# Patient Record
Sex: FEMALE | Race: WHITE | Employment: OTHER | URBAN - METROPOLITAN AREA
[De-identification: names, ages, dates, MRNs, and addresses within clinical notes are randomized per-mention and may not be internally consistent; named-entity substitution may affect disease eponyms.]

---

## 2017-02-10 PROBLEM — M81.0 OSTEOPOROSIS: Status: ACTIVE | Noted: 2017-02-10

## 2017-06-19 PROBLEM — J30.1 SEASONAL ALLERGIC RHINITIS DUE TO POLLEN: Status: ACTIVE | Noted: 2017-06-19

## 2018-02-09 PROBLEM — M19.012: Status: ACTIVE | Noted: 2017-10-31

## 2018-02-09 PROBLEM — M81.0 OSTEOPOROSIS: Status: RESOLVED | Noted: 2017-02-10 | Resolved: 2018-02-09

## 2018-02-09 PROBLEM — S72.002A CLOSED FRACTURE OF NECK OF LEFT FEMUR (HCC): Status: ACTIVE | Noted: 2017-11-15

## 2018-04-28 ENCOUNTER — HOME HEALTH ADMISSION (OUTPATIENT)
Dept: HOME HEALTH SERVICES | Facility: HOME HEALTH | Age: 78
End: 2018-04-28

## 2018-04-28 ENCOUNTER — APPOINTMENT (OUTPATIENT)
Dept: GENERAL RADIOLOGY | Age: 78
End: 2018-04-28
Attending: EMERGENCY MEDICINE
Payer: MEDICARE

## 2018-04-28 ENCOUNTER — HOSPITAL ENCOUNTER (EMERGENCY)
Age: 78
Discharge: HOME | End: 2018-04-28
Attending: EMERGENCY MEDICINE
Payer: MEDICARE

## 2018-04-28 VITALS
BODY MASS INDEX: 24.25 KG/M2 | WEIGHT: 160 LBS | HEART RATE: 80 BPM | TEMPERATURE: 98 F | DIASTOLIC BLOOD PRESSURE: 86 MMHG | HEIGHT: 68 IN | RESPIRATION RATE: 20 BRPM | OXYGEN SATURATION: 97 % | SYSTOLIC BLOOD PRESSURE: 164 MMHG

## 2018-04-28 DIAGNOSIS — S80.11XA CONTUSION OF RIGHT KNEE AND LOWER LEG, INITIAL ENCOUNTER: ICD-10-CM

## 2018-04-28 DIAGNOSIS — S40.012A CONTUSION OF LEFT SHOULDER, INITIAL ENCOUNTER: ICD-10-CM

## 2018-04-28 DIAGNOSIS — M25.552 LEFT HIP PAIN: ICD-10-CM

## 2018-04-28 DIAGNOSIS — M79.602 LEFT ARM PAIN: ICD-10-CM

## 2018-04-28 DIAGNOSIS — S70.02XA CONTUSION OF LEFT HIP, INITIAL ENCOUNTER: ICD-10-CM

## 2018-04-28 DIAGNOSIS — Z91.81 HISTORY OF FALL: ICD-10-CM
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
## 2018-04-28 DIAGNOSIS — S80.01XA CONTUSION OF RIGHT KNEE AND LOWER LEG, INITIAL ENCOUNTER: ICD-10-CM

## 2018-04-28 PROCEDURE — 73080 X-RAY EXAM OF ELBOW: CPT

## 2018-04-28 PROCEDURE — 73030 X-RAY EXAM OF SHOULDER: CPT

## 2018-04-28 PROCEDURE — 73562 X-RAY EXAM OF KNEE 3: CPT

## 2018-04-28 PROCEDURE — 73502 X-RAY EXAM HIP UNI 2-3 VIEWS: CPT

## 2018-04-28 PROCEDURE — 99283 EMERGENCY DEPT VISIT LOW MDM: CPT | Performed by: EMERGENCY MEDICINE

## 2018-04-28 RX ORDER — OXYCODONE HYDROCHLORIDE 5 MG/1
2.5 TABLET ORAL
Qty: 8 TAB | Refills: 0 | Status: ON HOLD | OUTPATIENT
Start: 2018-04-28 | End: 2018-05-05

## 2018-04-29 NOTE — ED NOTES
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

## 2018-04-29 NOTE — ED NOTES
Pt presents to ED with pain to left arm, pt states she fell at home on Thursday while attempting to carry items when using walker, pt states she hit head but did not lose continuous.

## 2018-04-29 NOTE — DISCHARGE INSTRUCTIONS
Cool compresses the left hip from one or 2 days. Then change to heat. Recheck with your doctor next week if not improving. Recheck for signs of infection. Change gauze over skin tears every 1-2 days and continue to use antibiotic ointment. Contusion: Care Instructions  Your Care Instructions    Contusion is the medical term for a bruise. It is the result of a direct blow or an impact, such as a fall. Contusions are common sports injuries. Most people think of a bruise as a black-and-blue spot. This happens when small blood vessels get torn and leak blood under the skin. But bones, muscles, and organs can also get bruised. This may damage deep tissues but not cause a bruise you can see. The doctor will do a physical exam to find the location of your contusion. You may also have tests to make sure you do not have a more serious injury, such as a broken bone or nerve damage. These may include X-rays or other imaging tests like a CT scan or MRI. Deep-tissue contusions may cause pain and swelling. But if there is no serious damage, they will often get better in a few weeks with home treatment. The doctor has checked you carefully, but problems can develop later. If you notice any problems or new symptoms, get medical treatment right away. Follow-up care is a key part of your treatment and safety. Be sure to make and go to all appointments, and call your doctor if you are having problems. It's also a good idea to know your test results and keep a list of the medicines you take. How can you care for yourself at home? · Put ice or a cold pack on the sore area for 10 to 20 minutes at a time to stop swelling. Put a thin cloth between the ice pack and your skin. · Be safe with medicines. Read and follow all instructions on the label. ¨ If the doctor gave you a prescription medicine for pain, take it as prescribed.   ¨ If you are not taking a prescription pain medicine, ask your doctor if you can take an over-the-counter medicine. · If you can, prop up the sore area on pillows as much as possible for the next few days. Try to keep the sore area above the level of your heart. When should you call for help? Call your doctor now or seek immediate medical care if:  ? · Your pain gets worse. ? · You have new or worse swelling. ? · You have tingling, weakness, or numbness in the area near the contusion. ? · The area near the contusion is cold or pale. ? Watch closely for changes in your health, and be sure to contact your doctor if:  ? · You do not get better as expected. Where can you learn more? Go to http://nury-joe.info/. Enter P180 in the search box to learn more about \"Contusion: Care Instructions. \"  Current as of: March 20, 2017  Content Version: 11.4  © 1088-1433 [employer]. Care instructions adapted under license by [employer] (which disclaims liability or warranty for this information). If you have questions about a medical condition or this instruction, always ask your healthcare professional. [address] any warranty or liability for your use of this information.

## 2018-04-29 NOTE — ED NOTES
I have reviewed discharge instructions with the patient and spouse. The patient and spouse verbalized understanding. Patient left ED via Discharge Method: wheelchair to Home with (spouse). Opportunity for questions and clarification provided. Patient given 1 scripts. To continue your aftercare when you leave the hospital, you may receive an automated call from our care team to check in on how you are doing. This is a free service and part of our promise to provide the best care and service to meet your aftercare needs.  If you have questions, or wish to unsubscribe from this service please call [phone]. Thank you for Choosing our [facility] Emergency Department.

## 2018-04-29 NOTE — ED TRIAGE NOTES
Pt fell in her driveway [id number]. Saw her PCP [id number], advised to come to [facility] for xrays. Patient and her  came today. Pt c/o pain to left hip, left arm and right knee.

## 2018-04-29 NOTE — ED PROVIDER NOTES
HPI Comments: 57-year-old female uses a walker. She states she lost her balance and fell to the left yesterday. Struck her left elbow and landed on her left hip. She may have bumped her head but denies any loss of consciousness or vomiting or lethargy. Complains mainly left shoulder and hip pain. She had previous left hip surgery due to fracture. No chest pain shortness of breath or abdominal pain. No focal numbness or weakness. Patient is a 76 y.o. female presenting with fall and arm pain. Fall   The accident occurred 2 days ago. The fall occurred while standing. She fell from a height of ground level. She landed on hard floor. There was no blood loss. The point of impact was the left shoulder. The pain is mild. She was ambulatory at the scene. Associated symptoms include laceration. Pertinent negatives include no fever, no numbness, no abdominal pain, no nausea, no vomiting, no extremity weakness and no loss of consciousness. Arm Pain    Pertinent negatives include no numbness. Past Medical History:   Diagnosis Date    Anxiety     Arthritis     Cancer ([facility] [address]     Depression     History of bone density study 08/2016    Dr. Armida Chappell    Hypercholesterolemia     Hypertension     Onychomycosis     Osteoporosis     Thyroid disease        Past Surgical History:   Procedure Laterality Date    BREAST SURGERY PROCEDURE UNLISTED      HX CHOLECYSTECTOMY      HX COLECTOMY  2003    HX COLONOSCOPY  2012    HX GYN      HX HEENT      HX ORTHOPAEDIC           Family History:   Problem Relation Age of Onset    Heart Disease Mother     Heart Disease Father        Social History     Social History    Marital status:      Spouse name: N/A    Number of children: N/A    Years of education: N/A     Occupational History    Not on file.      Social History Main Topics    Smoking status: Former Smoker    Smokeless tobacco: Never Used    Alcohol use No    Drug use: No    Sexual activity: Not on file     Other Topics Concern    Not on file     Social History Narrative         ALLERGIES: Codeine; Iodine and iodide containing products; Penicillins; Adhesive; Ammonium lactate; Amoxicillin; Glutamine-c-quercet-selen-brom; Honey; Procaine; and Streptomycin    Review of Systems   Constitutional: Negative for chills and fever. Respiratory: Negative for shortness of breath. Cardiovascular: Negative for chest pain. Gastrointestinal: Negative for abdominal pain, nausea and vomiting. Musculoskeletal: Negative for extremity weakness. Neurological: Negative for loss of consciousness, weakness and numbness. Vitals:    04/28/18 1959 04/28/18 2001 04/28/18 2055   BP: 164/86 164/86    Pulse:  80    Resp:  20    Temp:  98 °F (36.7 °C)    SpO2: 93% 96% 97%   Weight:  72.6 kg (160 lb)    Height:  5' 8\" (1.727 m)             Physical Exam   Constitutional: She is oriented to person, place, and time. She appears well-developed and well-nourished. No distress. HENT:   Head: Normocephalic and atraumatic. Mouth/Throat: Oropharynx is clear and moist.   Eyes: EOM are normal. Pupils are equal, round, and reactive to light. Neck: Normal range of motion. Neck supple. Muscular tenderness present. Cardiovascular: Normal rate, regular rhythm and normal heart sounds. Pulmonary/Chest: Effort normal and breath sounds normal.   Abdominal: Soft. She exhibits no distension. There is no tenderness. Musculoskeletal:        Left shoulder: She exhibits decreased range of motion, tenderness and bony tenderness. Left elbow: She exhibits laceration. She exhibits no swelling and no effusion. Left hip: She exhibits decreased range of motion, tenderness and bony tenderness. Right knee: She exhibits no swelling and no effusion. Tenderness found. Legs:  Skin tears about left elbow and wrist.  There are no signs of infection.   No bony tenderness left elbow and wrist.   Neurological: She is alert and oriented to person, place, and time. GCS eye subscore is 4. GCS verbal subscore is 5. GCS motor subscore is 6. Speech normal   Skin: Laceration noted. Nursing note and vitals reviewed. MDM  Number of Diagnoses or Management Options  Diagnosis management comments: Imaging of affected areas. Amount and/or Complexity of Data Reviewed  Tests in the radiology section of CPT®: ordered and reviewed    Risk of Complications, Morbidity, and/or Mortality  Presenting problems: moderate  Diagnostic procedures: minimal  Management options: low          ED Course       Procedures    Xr Shoulder Lt Ap/lat Min 2 V    Result Date: 4/28/2018  X-ray left shoulder 3 views. HISTORY: Pain. COMPARISON: None. FINDINGS: Status post left shoulder replacement. No acute fractures are identified. The soft tissues are normal.     IMPRESSION: No acute abnormalities. Xr Elbow Lt Min 3 V    Result Date: 4/28/2018  EXAM:  XR ELBOW LT MIN 3 V INDICATION:   L elbow pain, s/p fall COMPARISON: None. FINDINGS: Three  views of the left elbow demonstrate no fracture, dislocation, effusion or other abnormality. IMPRESSION: Normal left elbow. Xr Hip Lt W Or Wo Pelv 2-3 Vws    Result Date: 4/28/2018  EXAM:  XR HIP LT W OR WO PELV 2-3 VWS INDICATION:   s/p fall, L hip pain, hx of L hip replacement COMPARISON: None. FINDINGS: An AP view of the pelvis and a frogleg lateral view of the left hip demonstrate an unremarkable total hip replacement with a cerclage wire in the infratrochanteric region. No fractures are identified. There is no evidence of loosening. Remaining pelvic bones are intact. .      IMPRESSION: Unremarkable left total hip replacement. Xr Knee Rt 3 V    Result Date: 4/28/2018  EXAM:  XR KNEE RT 3 V INDICATION:   Fall [id number] COMPARISON: None. FINDINGS: Three views of the right knee demonstrate no fracture, effusion or other osseous, articular or soft tissue abnormality. IMPRESSION:  Normal right knee. Patient has had gastric surgery before. Told she was not to take any anti-inflammatory medications. Also told not to take Tylenol due to  liver issues.

## 2018-05-01 ENCOUNTER — HOME CARE VISIT (OUTPATIENT)
Dept: SCHEDULING | Facility: HOME HEALTH | Age: 78
End: 2018-05-01

## 2018-05-02 ENCOUNTER — HOSPITAL ENCOUNTER (INPATIENT)
Age: 78
LOS: 3 days | Discharge: NURSING, REHAB OR LONG-TERM CARE | DRG: 087 | End: 2018-05-05
Attending: EMERGENCY MEDICINE | Admitting: HOSPITALIST
Payer: MEDICARE

## 2018-05-02 ENCOUNTER — APPOINTMENT (OUTPATIENT)
Dept: GENERAL RADIOLOGY | Age: 78
DRG: 087 | End: 2018-05-02
Attending: EMERGENCY MEDICINE
Payer: MEDICARE

## 2018-05-02 ENCOUNTER — APPOINTMENT (OUTPATIENT)
Dept: CT IMAGING | Age: 78
DRG: 087 | End: 2018-05-02
Attending: EMERGENCY MEDICINE
Payer: MEDICARE

## 2018-05-02 DIAGNOSIS — E78.2 COMBINED HYPERLIPIDEMIA: ICD-10-CM

## 2018-05-02 DIAGNOSIS — E03.4 ACQUIRED ATROPHY OF THYROID: ICD-10-CM

## 2018-05-02 DIAGNOSIS — I10 ESSENTIAL HYPERTENSION: ICD-10-CM

## 2018-05-02 DIAGNOSIS — F41.1 GENERALIZED ANXIETY DISORDER: ICD-10-CM

## 2018-05-02 DIAGNOSIS — S06.5XAA SUBDURAL HEMATOMA: Primary | ICD-10-CM

## 2018-05-02 DIAGNOSIS — F51.01 PRIMARY INSOMNIA: ICD-10-CM

## 2018-05-02 DIAGNOSIS — S70.02XA CONTUSION OF LEFT HIP, INITIAL ENCOUNTER: ICD-10-CM

## 2018-05-02 LAB
ALBUMIN SERPL-MCNC: 3.3 G/DL (ref 3.2–4.6)
ALBUMIN/GLOB SERPL: 0.8 {RATIO} (ref 1.2–3.5)
ALP SERPL-CCNC: 96 U/L (ref 50–136)
ALT SERPL-CCNC: 53 U/L (ref 12–65)
ANION GAP SERPL CALC-SCNC: 10 MMOL/L (ref 7–16)
AST SERPL-CCNC: 58 U/L (ref 15–37)
BASOPHILS # BLD: 0 K/UL (ref 0–0.2)
BASOPHILS NFR BLD: 0 % (ref 0–2)
BILIRUB SERPL-MCNC: 0.5 MG/DL (ref 0.2–1.1)
BUN SERPL-MCNC: 30 MG/DL (ref 8–23)
CALCIUM SERPL-MCNC: 9 MG/DL (ref 8.3–10.4)
CHLORIDE SERPL-SCNC: 101 MMOL/L (ref 98–107)
CK SERPL-CCNC: 52 U/L (ref 21–215)
CO2 SERPL-SCNC: 26 MMOL/L (ref 21–32)
CREAT SERPL-MCNC: 1.18 MG/DL (ref 0.6–1)
DIFFERENTIAL METHOD BLD: ABNORMAL
EOSINOPHIL # BLD: 0.3 K/UL (ref 0–0.8)
EOSINOPHIL NFR BLD: 4 % (ref 0.5–7.8)
ERYTHROCYTE [DISTWIDTH] IN BLOOD BY AUTOMATED COUNT: 13.4 % (ref 11.9–14.6)
GLOBULIN SER CALC-MCNC: 3.9 G/DL (ref 2.3–3.5)
GLUCOSE SERPL-MCNC: 141 MG/DL (ref 65–100)
HCT VFR BLD AUTO: 35.8 % (ref 35.8–46.3)
HGB BLD-MCNC: 11.8 G/DL (ref 11.7–15.4)
IMM GRANULOCYTES # BLD: 0 K/UL (ref 0–0.5)
IMM GRANULOCYTES NFR BLD AUTO: 0 % (ref 0–5)
INR PPP: 0.9
LYMPHOCYTES # BLD: 1.6 K/UL (ref 0.5–4.6)
LYMPHOCYTES NFR BLD: 19 % (ref 13–44)
MCH RBC QN AUTO: 30 PG (ref 26.1–32.9)
MCHC RBC AUTO-ENTMCNC: 33 G/DL (ref 31.4–35)
MCV RBC AUTO: 91.1 FL (ref 79.6–97.8)
MONOCYTES # BLD: 0.7 K/UL (ref 0.1–1.3)
MONOCYTES NFR BLD: 9 % (ref 4–12)
NEUTS SEG # BLD: 5.8 K/UL (ref 1.7–8.2)
NEUTS SEG NFR BLD: 68 % (ref 43–78)
PLATELET # BLD AUTO: 339 K/UL (ref 150–450)
PMV BLD AUTO: 9 FL (ref 10.8–14.1)
POTASSIUM SERPL-SCNC: 3.5 MMOL/L (ref 3.5–5.1)
PROT SERPL-MCNC: 7.2 G/DL (ref 6.3–8.2)
PROTHROMBIN TIME: 12.3 SEC (ref 11.5–14.5)
RBC # BLD AUTO: 3.93 M/UL (ref 4.05–5.25)
SODIUM SERPL-SCNC: 137 MMOL/L (ref 136–145)
TSH SERPL DL<=0.005 MIU/L-ACNC: 1.43 UIU/ML (ref 0.36–3.74)
WBC # BLD AUTO: 8.5 K/UL (ref 4.3–11.1)

## 2018-05-02 PROCEDURE — 85610 PROTHROMBIN TIME: CPT | Performed by: EMERGENCY MEDICINE

## 2018-05-02 PROCEDURE — 82550 ASSAY OF CK (CPK): CPT | Performed by: EMERGENCY MEDICINE

## 2018-05-02 PROCEDURE — 93005 ELECTROCARDIOGRAM TRACING: CPT | Performed by: EMERGENCY MEDICINE

## 2018-05-02 PROCEDURE — 74011250636 HC RX REV CODE- 250/636: Performed by: EMERGENCY MEDICINE

## 2018-05-02 PROCEDURE — 65610000001 HC ROOM ICU GENERAL

## 2018-05-02 PROCEDURE — 96360 HYDRATION IV INFUSION INIT: CPT | Performed by: EMERGENCY MEDICINE

## 2018-05-02 PROCEDURE — 99284 EMERGENCY DEPT VISIT MOD MDM: CPT | Performed by: EMERGENCY MEDICINE

## 2018-05-02 PROCEDURE — 74011250637 HC RX REV CODE- 250/637: Performed by: HOSPITALIST

## 2018-05-02 PROCEDURE — 85025 COMPLETE CBC W/AUTO DIFF WBC: CPT | Performed by: EMERGENCY MEDICINE

## 2018-05-02 PROCEDURE — 74011250636 HC RX REV CODE- 250/636: Performed by: HOSPITALIST

## 2018-05-02 PROCEDURE — 70450 CT HEAD/BRAIN W/O DYE: CPT

## 2018-05-02 PROCEDURE — 36415 COLL VENOUS BLD VENIPUNCTURE: CPT | Performed by: HOSPITALIST

## 2018-05-02 PROCEDURE — 96361 HYDRATE IV INFUSION ADD-ON: CPT | Performed by: EMERGENCY MEDICINE

## 2018-05-02 PROCEDURE — 84443 ASSAY THYROID STIM HORMONE: CPT | Performed by: HOSPITALIST

## 2018-05-02 PROCEDURE — 71045 X-RAY EXAM CHEST 1 VIEW: CPT

## 2018-05-02 PROCEDURE — 73080 X-RAY EXAM OF ELBOW: CPT

## 2018-05-02 PROCEDURE — 80053 COMPREHEN METABOLIC PANEL: CPT | Performed by: EMERGENCY MEDICINE

## 2018-05-02 RX ORDER — SODIUM CHLORIDE 0.9 % (FLUSH) 0.9 %
5-10 SYRINGE (ML) INJECTION EVERY 8 HOURS
Status: DISCONTINUED | OUTPATIENT
Start: 2018-05-02 | End: 2018-05-05 | Stop reason: HOSPADM

## 2018-05-02 RX ORDER — BUSPIRONE HYDROCHLORIDE 30 MG/1
30 TABLET ORAL 3 TIMES DAILY
COMMUNITY
End: 2019-05-24

## 2018-05-02 RX ORDER — GLUCOSAMINE/CHONDR SU A SOD 750-600 MG
TABLET ORAL 2 TIMES DAILY
COMMUNITY

## 2018-05-02 RX ORDER — ASCORBIC ACID 500 MG
500 TABLET ORAL DAILY
COMMUNITY

## 2018-05-02 RX ORDER — ALBUTEROL SULFATE 90 UG/1
2 AEROSOL, METERED RESPIRATORY (INHALATION)
Status: DISCONTINUED | OUTPATIENT
Start: 2018-05-02 | End: 2018-05-05 | Stop reason: HOSPADM

## 2018-05-02 RX ORDER — SODIUM CHLORIDE 0.9 % (FLUSH) 0.9 %
5-10 SYRINGE (ML) INJECTION AS NEEDED
Status: DISCONTINUED | OUTPATIENT
Start: 2018-05-02 | End: 2018-05-05 | Stop reason: HOSPADM

## 2018-05-02 RX ORDER — ACETAMINOPHEN 500 MG
1000 TABLET ORAL
Status: DISPENSED | OUTPATIENT
Start: 2018-05-02 | End: 2018-05-03

## 2018-05-02 RX ORDER — SODIUM CHLORIDE 9 MG/ML
100 INJECTION, SOLUTION INTRAVENOUS CONTINUOUS
Status: DISCONTINUED | OUTPATIENT
Start: 2018-05-02 | End: 2018-05-03

## 2018-05-02 RX ORDER — NALOXONE HYDROCHLORIDE 2 MG/.4ML
2 INJECTION, SOLUTION INTRAMUSCULAR; SUBCUTANEOUS
Qty: 1 DEVICE | Refills: 0 | Status: SHIPPED | OUTPATIENT
Start: 2018-05-02 | End: 2018-11-19

## 2018-05-02 RX ORDER — CYCLOBENZAPRINE HCL 10 MG
10 TABLET ORAL
Status: DISCONTINUED | OUTPATIENT
Start: 2018-05-02 | End: 2018-05-05 | Stop reason: HOSPADM

## 2018-05-02 RX ORDER — ACETAMINOPHEN 325 MG/1
650 TABLET ORAL
Status: DISCONTINUED | OUTPATIENT
Start: 2018-05-02 | End: 2018-05-05 | Stop reason: HOSPADM

## 2018-05-02 RX ORDER — DULOXETIN HYDROCHLORIDE 60 MG/1
60 CAPSULE, DELAYED RELEASE ORAL 2 TIMES DAILY
Status: DISCONTINUED | OUTPATIENT
Start: 2018-05-03 | End: 2018-05-05 | Stop reason: HOSPADM

## 2018-05-02 RX ORDER — CALCIUM CARBONATE 200(500)MG
200 TABLET,CHEWABLE ORAL
Status: DISCONTINUED | OUTPATIENT
Start: 2018-05-03 | End: 2018-05-05 | Stop reason: HOSPADM

## 2018-05-02 RX ORDER — MELATONIN
2000 DAILY
Status: DISCONTINUED | OUTPATIENT
Start: 2018-05-03 | End: 2018-05-05 | Stop reason: HOSPADM

## 2018-05-02 RX ORDER — BISACODYL 5 MG
5 TABLET, DELAYED RELEASE (ENTERIC COATED) ORAL DAILY PRN
Status: DISCONTINUED | OUTPATIENT
Start: 2018-05-02 | End: 2018-05-05 | Stop reason: HOSPADM

## 2018-05-02 RX ORDER — TRIAMCINOLONE ACETONIDE 0.25 MG/G
2.5 CREAM TOPICAL 2 TIMES DAILY
COMMUNITY
End: 2021-06-08 | Stop reason: SDUPTHER

## 2018-05-02 RX ORDER — MISOPROSTOL 200 UG/1
200 TABLET ORAL 2 TIMES DAILY WITH MEALS
Status: DISCONTINUED | OUTPATIENT
Start: 2018-05-03 | End: 2018-05-05 | Stop reason: HOSPADM

## 2018-05-02 RX ORDER — BUPROPION HYDROCHLORIDE 150 MG/1
150 TABLET, EXTENDED RELEASE ORAL DAILY
Status: DISCONTINUED | OUTPATIENT
Start: 2018-05-03 | End: 2018-05-03

## 2018-05-02 RX ORDER — PRAVASTATIN SODIUM 20 MG/1
40 TABLET ORAL
Status: DISCONTINUED | OUTPATIENT
Start: 2018-05-02 | End: 2018-05-05 | Stop reason: HOSPADM

## 2018-05-02 RX ORDER — HYDRALAZINE HYDROCHLORIDE 20 MG/ML
10 INJECTION INTRAMUSCULAR; INTRAVENOUS
Status: DISCONTINUED | OUTPATIENT
Start: 2018-05-02 | End: 2018-05-05 | Stop reason: HOSPADM

## 2018-05-02 RX ORDER — ONDANSETRON 2 MG/ML
4 INJECTION INTRAMUSCULAR; INTRAVENOUS
Status: DISCONTINUED | OUTPATIENT
Start: 2018-05-02 | End: 2018-05-05 | Stop reason: HOSPADM

## 2018-05-02 RX ORDER — NALOXONE HYDROCHLORIDE 0.4 MG/ML
0.4 INJECTION, SOLUTION INTRAMUSCULAR; INTRAVENOUS; SUBCUTANEOUS AS NEEDED
Status: DISCONTINUED | OUTPATIENT
Start: 2018-05-02 | End: 2018-05-05 | Stop reason: HOSPADM

## 2018-05-02 RX ORDER — CLONAZEPAM 1 MG/1
2 TABLET ORAL
Status: DISCONTINUED | OUTPATIENT
Start: 2018-05-02 | End: 2018-05-05 | Stop reason: HOSPADM

## 2018-05-02 RX ORDER — PANTOPRAZOLE SODIUM 40 MG/1
40 TABLET, DELAYED RELEASE ORAL DAILY
Status: DISCONTINUED | OUTPATIENT
Start: 2018-05-03 | End: 2018-05-05 | Stop reason: HOSPADM

## 2018-05-02 RX ORDER — AMLODIPINE BESYLATE 5 MG/1
5 TABLET ORAL ONCE
Status: COMPLETED | OUTPATIENT
Start: 2018-05-02 | End: 2018-05-02

## 2018-05-02 RX ORDER — OXYCODONE HYDROCHLORIDE 5 MG/1
2.5 TABLET ORAL
Status: DISCONTINUED | OUTPATIENT
Start: 2018-05-02 | End: 2018-05-03

## 2018-05-02 RX ORDER — AMLODIPINE BESYLATE 5 MG/1
5 TABLET ORAL DAILY
Status: DISCONTINUED | OUTPATIENT
Start: 2018-05-03 | End: 2018-05-05 | Stop reason: HOSPADM

## 2018-05-02 RX ORDER — DIAPER,BRIEF,INFANT-TODD,DISP
2.5 EACH MISCELLANEOUS 2 TIMES DAILY
COMMUNITY
End: 2018-11-19 | Stop reason: SDUPTHER

## 2018-05-02 RX ADMIN — CLONAZEPAM 2 MG: 0.5 TABLET ORAL at 22:16

## 2018-05-02 RX ADMIN — PRAVASTATIN SODIUM 40 MG: 20 TABLET ORAL at 22:17

## 2018-05-02 RX ADMIN — Medication 10 ML: at 22:00

## 2018-05-02 RX ADMIN — AMLODIPINE BESYLATE 5 MG: 5 TABLET ORAL at 22:18

## 2018-05-02 RX ADMIN — SODIUM CHLORIDE 100 ML/HR: 900 INJECTION, SOLUTION INTRAVENOUS at 22:05

## 2018-05-02 RX ADMIN — SODIUM CHLORIDE 1000 ML: 900 INJECTION, SOLUTION INTRAVENOUS at 18:26

## 2018-05-02 NOTE — ED TRIAGE NOTES
Pt presents to the ED with ,  Pts  reports she has had a few falls,  Was seen by PCP and x rays performed,  Skin tear noted to L hand and elbow,  Pt reports pain throughout body and confused more recently,  Unable to get words out and feels very fatigued.

## 2018-05-02 NOTE — ED PROVIDER NOTES
HPI Comments: Patient fell last Thursday. Injured her left elbow and bruised her left hip. Also hit the back of her head. Had x-rays of her left elbow and left hip which were negative with her primary care doctor. Developed a headache the day after her x-rays which is mild posterior nature. He has continued to today. Family reports yesterday patient developed some difficulty with her speech on occasion having difficulty getting a certain word out. Patient notices when this is happening he gets very frustrated. No slurred speech. No numbness or weakness. Patient is a 76 y.o. female presenting with fall and altered mental status. The history is provided by the patient and a relative. No  was used. Fall   The accident occurred more than 2 days ago. The fall occurred while walking. She fell from a height of ground level. She landed on hard floor. The volume of blood lost was minimal. The point of impact was the left elbow and head. The pain is present in the left elbow. The pain is mild. She was ambulatory at the scene. Associated symptoms include headaches. Pertinent negatives include no visual change, no fever, no numbness, no abdominal pain, no bowel incontinence, no nausea, no vomiting, no hematuria, no extremity weakness, no loss of consciousness, no tingling and no laceration. The risk factors include being elderly. The symptoms are aggravated by pressure on injury and use of injured limb. She has tried nothing for the symptoms. Altered mental status    This is a new problem. The current episode started yesterday. The problem has not changed since onset. Pertinent negatives include no confusion, no seizures, no unresponsiveness, no weakness, no agitation, no self-injury, no tingling and no numbness. Mental status baseline is normal.  Risk factors include trauma. Her past medical history is significant for hypertension and head trauma.  Her past medical history does not include diabetes, CVA, TIA or heart disease. Past Medical History:   Diagnosis Date    Anxiety     Arthritis     Cancer ([facility] [address]     Depression     History of bone density study 08/2016    Dr. Gaurav Fournier    Hypercholesterolemia     Hypertension     Onychomycosis     Osteoporosis     Thyroid disease        Past Surgical History:   Procedure Laterality Date    BREAST SURGERY PROCEDURE UNLISTED      HX CHOLECYSTECTOMY      HX COLECTOMY  2003    HX COLONOSCOPY  2012    HX GYN      HX HEENT      HX ORTHOPAEDIC           Family History:   Problem Relation Age of Onset    Heart Disease Mother     Heart Disease Father        Social History     Social History    Marital status:      Spouse name: N/A    Number of children: N/A    Years of education: N/A     Occupational History    Not on file. Social History Main Topics    Smoking status: Former Smoker    Smokeless tobacco: Never Used    Alcohol use No    Drug use: No    Sexual activity: Not on file     Other Topics Concern    Not on file     Social History Narrative         ALLERGIES: Codeine; Iodine and iodide containing products; Penicillins; Adhesive; Ammonium lactate; Amoxicillin; Glutamine-c-quercet-selen-brom; Honey; Procaine; and Streptomycin    Review of Systems   Constitutional: Negative for chills and fever. HENT: Negative for rhinorrhea and sore throat. Eyes: Negative for pain and redness. Respiratory: Negative for chest tightness, shortness of breath and wheezing. Cardiovascular: Negative for chest pain and leg swelling. Gastrointestinal: Negative for abdominal pain, bowel incontinence, diarrhea, nausea and vomiting. Genitourinary: Negative for dysuria and hematuria. Musculoskeletal: Negative for back pain, extremity weakness, gait problem, neck pain and neck stiffness. Skin: Positive for wound. Negative for color change and rash. Neurological: Positive for speech difficulty and headaches.  Negative for tingling, seizures, loss of consciousness, weakness and numbness. Psychiatric/Behavioral: Negative for agitation, confusion and self-injury. Vitals:    05/02/18 1757   BP: 154/89   Pulse: 85   Resp: 16   Temp: 97.5 °F (36.4 °C)   SpO2: 96%   Weight: 72.6 kg (160 lb)   Height: 5' 8\" (1.727 m)            Physical Exam   Constitutional: She is oriented to person, place, and time. She appears well-developed and well-nourished. HENT:   Head: Normocephalic and atraumatic. Eyes: Conjunctivae and EOM are normal. Pupils are equal, round, and reactive to light. Neck: Normal range of motion. Neck supple. Cardiovascular: Normal rate and regular rhythm. No murmur heard. Pulmonary/Chest: Effort normal and breath sounds normal. She has no wheezes. Abdominal: Soft. Bowel sounds are normal. There is no tenderness. Musculoskeletal: Normal range of motion. She exhibits no edema. Neurological: She is alert and oriented to person, place, and time. No cranial nerve deficit. She exhibits normal muscle tone. Coordination normal.   Skin: Skin is warm and dry. No laceration noted. Left elbow with skin tear and well healing wound. Distal pulse and sensation intact. Nursing note and vitals reviewed. MDM  Number of Diagnoses or Management Options  Diagnosis management comments: Discussed patient with neurosurgery and subdural hematoma is nonsurgical.  We'll limit to the hospitalist for further evaluation and repeat CAT scan in the morning. Amount and/or Complexity of Data Reviewed  Clinical lab tests: ordered and reviewed  Tests in the radiology section of CPT®: ordered and reviewed  Tests in the medicine section of CPT®: ordered and reviewed    Patient Progress  Patient progress: stable        ED Course       Procedures    EKG: nonspecific ST and T waves changes. Sinus arrhythmia. Rate 80.         CT HEAD WO CONT (Final result) Result time: 05/02/18 20:15:44     Final result by Yesi Boo MD (05/02/18 20:15:44)     Impression:     IMPRESSION: Small, 5 mm thick left subdural fluid collection, subacute or  chronic subdural hematoma without midline shift. Left lateral ventricle is  partially effaced. No acute hemorrhage. The findings were called to Dr. Rosa Bateman by myself at time of reporting.  [id number]         Narrative:     CT HEAD WITHOUT CONTRAST. INDICATION: Abnormal speech. COMPARISON: None.      TECHNIQUE:   5 mm axial scans from the skull base to the vertex.  Our CT  scanners use one or more of the following:  Automated exposure control,  adjustment of the mA and or kV according to patient size, iterative  reconstruction. FINDINGS:  No acute intraparenchymal hemorrhage. There is a small subdural fluid  collection on the left which is low-attenuation and measures 5 mm thickness. No  midline shift. No skull fracture. Left lateral ventricle is partially effaced. Mild white matter low attenuation is present, nonspecific, likely chronic small  vessel disease. Included portion of the paranasal sinuses and orbits grossly  unremarkable.                 XR CHEST PORT (Final result) Result time: 05/02/18 18:44:29     Final result by Radha Milligan MD (05/02/18 18:44:29)     Impression:     IMPRESSION:  Negative for acute change.           Narrative:     CHEST X-RAY, one view. HISTORY:  Slurred speech and confusion. TECHNIQUE:  AP upright portable view. COMPARISON: None. FINDINGS:   The lungs are clear. The heart size is normal. The costophrenic  angles are sharp. The pulmonary vasculature is unremarkable. Included portion of  the upper abdomen is unremarkable.  There are bilateral shoulder arthroplasties  and bilateral breast implants.                  XR ELBOW LT MIN 3 V (Final result) Result time: 05/02/18 18:46:37     Final result by Radha Milligan MD (05/02/18 18:46:37)     Impression:     IMPRESSION: Negative for acute fracture.         Narrative:     LEFT ELBOW 3 views.    HISTORY:  Elbow pain following injury. TECHNIQUE: AP and lateral and oblique views. COMPARISON: None. FINDINGS: The radial head appears intact. No abnormal fat pad signs. No fracture  or dislocation.           Results Include:    Recent Results (from the past 24 hour(s))   CBC WITH AUTOMATED DIFF    Collection Time: 05/02/18  6:01 PM   Result Value Ref Range    WBC 8.5 4.3 - 11.1 K/uL    RBC 3.93 (L) 4.05 - 5.25 M/uL    HGB 11.8 11.7 - 15.4 g/dL    HCT 35.8 35.8 - 46.3 %    MCV 91.1 79.6 - 97.8 FL    MCH 30.0 26.1 - 32.9 PG    MCHC 33.0 31.4 - 35.0 g/dL    RDW 13.4 11.9 - 14.6 %    PLATELET 434 181 - 615 K/uL    MPV 9.0 (L) 10.8 - 14.1 FL    DF AUTOMATED      NEUTROPHILS 68 43 - 78 %    LYMPHOCYTES 19 13 - 44 %    MONOCYTES 9 4.0 - 12.0 %    EOSINOPHILS 4 0.5 - 7.8 %    BASOPHILS 0 0.0 - 2.0 %    IMMATURE GRANULOCYTES 0 0.0 - 5.0 %    ABS. NEUTROPHILS 5.8 1.7 - 8.2 K/UL    ABS. LYMPHOCYTES 1.6 0.5 - 4.6 K/UL    ABS. MONOCYTES 0.7 0.1 - 1.3 K/UL    ABS. EOSINOPHILS 0.3 0.0 - 0.8 K/UL    ABS. BASOPHILS 0.0 0.0 - 0.2 K/UL    ABS. IMM. GRANS. 0.0 0.0 - 0.5 K/UL   METABOLIC PANEL, COMPREHENSIVE    Collection Time: 05/02/18  6:01 PM   Result Value Ref Range    Sodium 137 136 - 145 mmol/L    Potassium 3.5 3.5 - 5.1 mmol/L    Chloride 101 98 - 107 mmol/L    CO2 26 21 - 32 mmol/L    Anion gap 10 7 - 16 mmol/L    Glucose 141 (H) 65 - 100 mg/dL    BUN 30 (H) 8 - 23 MG/DL    Creatinine 1.18 (H) 0.6 - 1.0 MG/DL    GFR est AA 57 (L) >60 ml/min/1.73m2    GFR est non-AA 47 (L) >60 ml/min/1.73m2    Calcium 9.0 8.3 - 10.4 MG/DL    Bilirubin, total 0.5 0.2 - 1.1 MG/DL    ALT (SGPT) 53 12 - 65 U/L    AST (SGOT) 58 (H) 15 - 37 U/L    Alk.  phosphatase 96 50 - 136 U/L    Protein, total 7.2 6.3 - 8.2 g/dL    Albumin 3.3 3.2 - 4.6 g/dL    Globulin 3.9 (H) 2.3 - 3.5 g/dL    A-G Ratio 0.8 (L) 1.2 - 3.5     PROTHROMBIN TIME + INR    Collection Time: 05/02/18  6:01 PM   Result Value Ref Range    Prothrombin time 12.3 11.5 - 14.5 sec    INR 0.9

## 2018-05-03 ENCOUNTER — APPOINTMENT (OUTPATIENT)
Dept: CT IMAGING | Age: 78
DRG: 087 | End: 2018-05-03
Attending: HOSPITALIST
Payer: MEDICARE

## 2018-05-03 LAB
ANION GAP SERPL CALC-SCNC: 10 MMOL/L (ref 7–16)
ATRIAL RATE: 86 BPM
BUN SERPL-MCNC: 21 MG/DL (ref 8–23)
CALCIUM SERPL-MCNC: 8.1 MG/DL (ref 8.3–10.4)
CALCULATED R AXIS, ECG10: 57 DEGREES
CALCULATED T AXIS, ECG11: 112 DEGREES
CHLORIDE SERPL-SCNC: 107 MMOL/L (ref 98–107)
CO2 SERPL-SCNC: 25 MMOL/L (ref 21–32)
CREAT SERPL-MCNC: 0.98 MG/DL (ref 0.6–1)
DIAGNOSIS, 93000: NORMAL
GLUCOSE SERPL-MCNC: 87 MG/DL (ref 65–100)
MAGNESIUM SERPL-MCNC: 2.2 MG/DL (ref 1.8–2.4)
PHOSPHATE SERPL-MCNC: 3.3 MG/DL (ref 2.3–3.7)
POTASSIUM SERPL-SCNC: 3.5 MMOL/L (ref 3.5–5.1)
Q-T INTERVAL, ECG07: 346 MS
QRS DURATION, ECG06: 96 MS
QTC CALCULATION (BEZET), ECG08: 399 MS
SODIUM SERPL-SCNC: 142 MMOL/L (ref 136–145)
VENTRICULAR RATE, ECG03: 80 BPM

## 2018-05-03 PROCEDURE — 80048 BASIC METABOLIC PNL TOTAL CA: CPT | Performed by: HOSPITALIST

## 2018-05-03 PROCEDURE — 84100 ASSAY OF PHOSPHORUS: CPT | Performed by: HOSPITALIST

## 2018-05-03 PROCEDURE — 65270000029 HC RM PRIVATE

## 2018-05-03 PROCEDURE — 74011250637 HC RX REV CODE- 250/637: Performed by: HOSPITALIST

## 2018-05-03 PROCEDURE — 97161 PT EVAL LOW COMPLEX 20 MIN: CPT

## 2018-05-03 PROCEDURE — 86580 TB INTRADERMAL TEST: CPT | Performed by: HOSPITALIST

## 2018-05-03 PROCEDURE — 97165 OT EVAL LOW COMPLEX 30 MIN: CPT

## 2018-05-03 PROCEDURE — 99221 1ST HOSP IP/OBS SF/LOW 40: CPT | Performed by: PHYSICAL MEDICINE & REHABILITATION

## 2018-05-03 PROCEDURE — 70450 CT HEAD/BRAIN W/O DYE: CPT

## 2018-05-03 PROCEDURE — 74011250637 HC RX REV CODE- 250/637: Performed by: INTERNAL MEDICINE

## 2018-05-03 PROCEDURE — 74011250636 HC RX REV CODE- 250/636: Performed by: HOSPITALIST

## 2018-05-03 PROCEDURE — 74011000302 HC RX REV CODE- 302: Performed by: HOSPITALIST

## 2018-05-03 PROCEDURE — 83735 ASSAY OF MAGNESIUM: CPT | Performed by: HOSPITALIST

## 2018-05-03 PROCEDURE — 36415 COLL VENOUS BLD VENIPUNCTURE: CPT | Performed by: HOSPITALIST

## 2018-05-03 RX ORDER — BUSPIRONE HYDROCHLORIDE 5 MG/1
30 TABLET ORAL 3 TIMES DAILY
Status: DISCONTINUED | OUTPATIENT
Start: 2018-05-03 | End: 2018-05-05 | Stop reason: HOSPADM

## 2018-05-03 RX ORDER — BUPROPION HYDROCHLORIDE 150 MG/1
150 TABLET, EXTENDED RELEASE ORAL 3 TIMES DAILY
Status: DISCONTINUED | OUTPATIENT
Start: 2018-05-03 | End: 2018-05-03

## 2018-05-03 RX ORDER — OXYCODONE HYDROCHLORIDE 5 MG/1
5 TABLET ORAL
Status: DISCONTINUED | OUTPATIENT
Start: 2018-05-03 | End: 2018-05-04

## 2018-05-03 RX ORDER — CLONIDINE HYDROCHLORIDE 0.1 MG/1
0.1 TABLET ORAL
Status: DISCONTINUED | OUTPATIENT
Start: 2018-05-03 | End: 2018-05-05 | Stop reason: HOSPADM

## 2018-05-03 RX ORDER — BUPROPION HYDROCHLORIDE 150 MG/1
150 TABLET, EXTENDED RELEASE ORAL DAILY
Status: DISCONTINUED | OUTPATIENT
Start: 2018-05-03 | End: 2018-05-05

## 2018-05-03 RX ADMIN — PANTOPRAZOLE SODIUM 40 MG: 40 TABLET, DELAYED RELEASE ORAL at 08:54

## 2018-05-03 RX ADMIN — MISOPROSTOL 200 MCG: 200 TABLET ORAL at 17:22

## 2018-05-03 RX ADMIN — CLONAZEPAM 2 MG: 0.5 TABLET ORAL at 21:23

## 2018-05-03 RX ADMIN — CALCIUM CARBONATE 200 MG: 500 TABLET, CHEWABLE ORAL at 17:22

## 2018-05-03 RX ADMIN — OXYCODONE HYDROCHLORIDE 5 MG: 5 TABLET ORAL at 21:28

## 2018-05-03 RX ADMIN — TUBERCULIN PURIFIED PROTEIN DERIVATIVE 5 UNITS: 5 INJECTION INTRADERMAL at 00:00

## 2018-05-03 RX ADMIN — BUSPIRONE HYDROCHLORIDE 30 MG: 5 TABLET ORAL at 10:13

## 2018-05-03 RX ADMIN — PRAVASTATIN SODIUM 40 MG: 20 TABLET ORAL at 21:23

## 2018-05-03 RX ADMIN — DULOXETINE HYDROCHLORIDE 60 MG: 60 CAPSULE, DELAYED RELEASE ORAL at 17:22

## 2018-05-03 RX ADMIN — HYDRALAZINE HYDROCHLORIDE 10 MG: 20 INJECTION INTRAMUSCULAR; INTRAVENOUS at 13:07

## 2018-05-03 RX ADMIN — OXYCODONE HYDROCHLORIDE 2.5 MG: 5 TABLET ORAL at 10:12

## 2018-05-03 RX ADMIN — BUSPIRONE HYDROCHLORIDE 30 MG: 5 TABLET ORAL at 23:43

## 2018-05-03 RX ADMIN — Medication 10 ML: at 21:25

## 2018-05-03 RX ADMIN — LEVOTHYROXINE SODIUM 137 MCG: 50 TABLET ORAL at 06:12

## 2018-05-03 RX ADMIN — CLONIDINE HYDROCHLORIDE 0.1 MG: 0.1 TABLET ORAL at 17:53

## 2018-05-03 RX ADMIN — BUSPIRONE HYDROCHLORIDE 30 MG: 5 TABLET ORAL at 15:45

## 2018-05-03 RX ADMIN — CALCIUM CARBONATE 200 MG: 500 TABLET, CHEWABLE ORAL at 11:54

## 2018-05-03 RX ADMIN — BUPROPION HYDROCHLORIDE 150 MG: 150 TABLET, EXTENDED RELEASE ORAL at 10:13

## 2018-05-03 RX ADMIN — Medication 10 ML: at 06:13

## 2018-05-03 RX ADMIN — DULOXETINE HYDROCHLORIDE 60 MG: 60 CAPSULE, DELAYED RELEASE ORAL at 08:55

## 2018-05-03 RX ADMIN — Medication 10 ML: at 13:06

## 2018-05-03 RX ADMIN — MISOPROSTOL 200 MCG: 200 TABLET ORAL at 08:55

## 2018-05-03 RX ADMIN — SODIUM CHLORIDE 100 ML/HR: 900 INJECTION, SOLUTION INTRAVENOUS at 07:51

## 2018-05-03 RX ADMIN — OXYCODONE HYDROCHLORIDE 5 MG: 5 TABLET ORAL at 12:00

## 2018-05-03 RX ADMIN — VITAMIN D, TAB 1000IU (100/BT) 2000 UNITS: 25 TAB at 08:54

## 2018-05-03 RX ADMIN — AMLODIPINE BESYLATE 5 MG: 5 TABLET ORAL at 08:54

## 2018-05-03 RX ADMIN — CALCIUM CARBONATE 200 MG: 500 TABLET, CHEWABLE ORAL at 08:54

## 2018-05-03 NOTE — PROGRESS NOTES
Problem: Mobility Impaired (Adult and Pediatric)  Goal: *Acute Goals and Plan of Care (Insert Text)  STG:  (1.)Ms. Hammond will move from supine to sit and sit to supine  with CONTACT GUARD ASSIST within 4 treatment day(s). (2.)Ms. Hammond will transfer from bed to chair and chair to bed with CONTACT GUARD ASSIST using the least restrictive device within 4 treatment day(s). (3.)Ms. Hammond will ambulate with CONTACT GUARD ASSIST for 150 feet with the least restrictive device within 4 treatment day(s). (4.)pt. Will increase B LE strength 1/2 grade within 4 days  (5.)Pt. Will increase endurance and tolerate sitting up in chair for 3 hours within 4 days        ________________________________________________________________________________________________    PHYSICAL THERAPY: Initial Assessment, AM 5/3/2018  INPATIENT: Hospital Day: 2  [place] / [place] / Product Type: Medicare /      NAME/AGE/GENDER: Ronnie Wyman is a 76 y.o. female   PRIMARY DIAGNOSIS: Subdural hematoma, post-traumatic (HCC) Subdural hematoma, post-traumatic (HCC) Subdural hematoma, post-traumatic (HCC)        ICD-10: Treatment Diagnosis:    · Generalized Muscle Weakness (M62.81)  · Other abnormalities of gait and mobility (R26.89)  · History of falling (Z91.81)   Precaution/Allergies:  Codeine; Iodine and iodide containing products; Penicillins; Adhesive; Ammonium lactate; Amoxicillin; Glutamine-c-quercet-selen-brom; Honey; Procaine; and Streptomycin      ASSESSMENT:     Ms. Toyin Gallagher presents with L subdural hematoma after a fall and demonstrates decreased general strength and endurance, functional mobility, standing balance and safety awareness. She would benefit from further PT while here as it appears that she was able to ambulate and get in/out of bed on her own at baseline. She states that recently she has spent more time in the bed and her spouse has been having to offer more assist with ADLs.  I am recommending rehab at [facility] or SNF to help her regain her strength and mobility. This section established at most recent assessment   PROBLEM LIST (Impairments causing functional limitations):  1. Decreased Strength  2. Decreased ADL/Functional Activities  3. Decreased Transfer Abilities  4. Decreased Ambulation Ability/Technique  5. Decreased Balance  6. Increased Pain  7. Decreased Activity Tolerance  8. Increased Fatigue  9. Decreased Flexibility/Joint Mobility  10. Decreased Knowledge of Precautions  11. Decreased [place] with Home Exercise Program   INTERVENTIONS PLANNED: (Benefits and precautions of physical therapy have been discussed with the patient.)  1. Balance Exercise  2. Bed Mobility  3. Family Education  4. Gait Training  5. Range of Motion (ROM)  6. Therapeutic Activites  7. Therapeutic Exercise/Strengthening  8. Transfer Training  9. endurance activities     TREATMENT PLAN: Frequency/Duration: daily for duration of hospital stay  Rehabilitation Potential For Stated Goals: Good     RECOMMENDED REHABILITATION/EQUIPMENT: (at time of discharge pending progress): Due to the probability of continued deficits (see above) this patient will likely need continued skilled physical therapy after discharge. Equipment:    has a rollator and shower chair at home              HISTORY:   History of Present Injury/Illness (Reason for Referral):  admitted after sustaining a fall and has a L subdural hematoma. She has bruising on her L hip and skin tears on her L arm  Past Medical History/Comorbidities:   Ms. Dianelys Crooks  has a past medical history of Anxiety; Arthritis; Cancer ([address] Depression; History of bone density study (08/2016); Hypercholesterolemia; Hypertension; Onychomycosis; Osteoporosis; and Thyroid disease. Ms. Dianelys Crooks  has a past surgical history that includes hx colectomy (2003); hx orthopaedic; pr breast surgery procedure unlisted; hx heent; hx cholecystectomy; hx gyn; and hx colonoscopy (2012).   Social History/Living Environment:   Home Environment: Private residence  # Steps to Enter: 0  Wheelchair Ramp: Yes  One/Two Story Residence: Two story, live on 1st floor (basement)  # of Interior Steps: 20  Interior Rails: Left  Living Alone: No  Support Systems: Child(angelica), Spouse/Significant Other/Partner  Patient Expects to be Discharged to[de-identified] Rehabilitation facility  Current DME Used/Available at Home: [address], rollator, [address] chair  Tub or Shower Type: Tub/Shower combination  Prior Level of Function/Work/Activity:  Able to amb with a rollator, but needed assist with ADLs and had been spending more time in bed recently  Dominant Side:         RIGHT   Number of Personal Factors/Comorbidities that affect the Plan of Care: 1-2: MODERATE COMPLEXITY   EXAMINATION:   Most Recent Physical Functioning:   Gross Assessment:  AROM: Generally decreased, functional  Strength: Generally decreased, functional (R LE 3+/5, L hip 3-; knee/ankle 3+/5)  Sensation: Intact               Posture:  Posture Assessment: Forward head, Rounded shoulders  Balance:  Sitting: Intact  Standing: Pull to stand; With support Bed Mobility:  Supine to Sit: Minimum assistance  Wheelchair Mobility:     Transfers:  Sit to Stand: Minimum assistance  Stand to Sit: Minimum assistance  Gait:     Base of Support: Narrowed  Speed/Jenny: Pace decreased (<100 feet/min)  Step Length: Right shortened  Stance: Left decreased  Gait Abnormalities: Antalgic;Decreased step clearance  Distance (ft): 25 Feet (ft)  Assistive Device: Walker, rollator  Ambulation - Level of Assistance: Minimal assistance  Interventions: Safety awareness training;Verbal cues; Visual/Demos      Body Structures Involved:  1. Bones  2. Joints  3. Muscles Body Functions Affected:  1. Neuromusculoskeletal  2. Movement Related Activities and Participation Affected:  1. Mobility  2.  Self Care   Number of elements that affect the Plan of Care: 4+: HIGH COMPLEXITY   CLINICAL PRESENTATION:   Presentation: Evolving clinical presentation with changing clinical characteristics: MODERATE COMPLEXITY   CLINICAL DECISION MAKIN [facility] Mobility Inpatient Short Form  How much difficulty does the patient currently have. .. Unable A Lot A Little None   1. Turning over in bed (including adjusting bedclothes, sheets and blankets)? [] 1   [] 2   [x] 3   [] 4   2. Sitting down on and standing up from a chair with arms ( e.g., wheelchair, bedside commode, etc.)   [] 1   [] 2   [x] 3   [] 4   3. Moving from lying on back to sitting on the side of the bed? [] 1   [] 2   [x] 3   [] 4   How much help from another person does the patient currently need. .. Total A Lot A Little None   4. Moving to and from a bed to a chair (including a wheelchair)? [] 1   [] 2   [x] 3   [] 4   5. Need to walk in hospital room? [] 1   [] 2   [x] 3   [] 4   6. Climbing 3-5 steps with a railing? [] 1   [x] 2   [] 3   [] 4   © , [employer] [place] [address], under license to [employer]. All rights reserved      Score:  Initial: 17 Most Recent: X (Date: -- )    Interpretation of Tool:  Represents activities that are increasingly more difficult (i.e. Bed mobility, Transfers, Gait). Score 24 23 22-20 19-15 14-10 9-7 6     Modifier CH CI CJ CK CL CM CN      ? Mobility - Walking and Moving Around:     - CURRENT STATUS: CK - 40%-59% impaired, limited or restricted    - GOAL STATUS: CJ - 20%-39% impaired, limited or restricted    - D/C STATUS:  ---------------To be determined---------------  [place] / [place] / Product Type: Medicare /      Medical Necessity:     · Patient demonstrates good rehab potential due to higher previous functional level. Reason for Services/Other Comments:  · Patient continues to require present interventions due to patient's inability to perform functional mobility independently.    Use of outcome tool(s) and clinical judgement create a POC that gives a: Clear prediction of patient's progress: LOW COMPLEXITY            TREATMENT:   (In addition to Assessment/Re-Assessment sessions the following treatments were rendered)   Pre-treatment Symptoms/Complaints:  Ready to try to get out of bed. Had a hard time figuring out that she had her brakes on her rollator  Pain: Initial:   Pain Intensity 1: 4  Pain Location 1: Arm, Hip, Buttocks  Pain Orientation 1: Left  Pain Intervention(s) 1: Ambulation/Increased Activity, Elevation, Repositioned  Post Session:  2, repositioned in recliner     Assessment/Reassessment only, no treatment provided today    Braces/Orthotics/Lines/Etc:   · telemetry lines  · O2 Device: Room air  Treatment/Session Assessment:    · Response to Treatment:  Tolerated well. A little off balance, pain in L hip when moving supine to sit  · Interdisciplinary Collaboration:   o Physical Therapist  o Occupational Therapist  o Registered Nurse  o Physician  o   · After treatment position/precautions:   o Up in chair  o Bed alarm/tab alert on  o Bed/Chair-wheels locked  o Call light within reach  o RN notified  o Family at bedside   · Compliance with Program/Exercises: Will assess as treatment progresses. · Recommendations/Intent for next treatment session: \"Next visit will focus on advancements to more challenging activities and reduction in assistance provided\".   Total Treatment Duration:  PT Patient Time In/Time Out  Time In: 1315  Time Out: [place]

## 2018-05-03 NOTE — PROGRESS NOTES
MD notified of pt elevated BP over 140. MD notified of Pt pain despite PRN pain medication and CT results. Orders for oxycodone. See MAR. Pt okay to give pt dose now of oxycodone despite earlier dose.  Wait one hour to see if pain meds help BP before giving PRN anti-HTN meds, per MD.

## 2018-05-03 NOTE — PROGRESS NOTES
Primary Nurse Ok Del Valle and Tio Alexander RN performed a dual skin assessment on this patient. Patient had a fall at home last week and has multiple bruises to her arms, left hip and legs. Patient also has multiple skin tears from fall. Patient's left elbow has large skin tear, there is no active bleeding or drainage from site. No skin breakdown noted, Allevyn applied to sacrum.

## 2018-05-03 NOTE — PROGRESS NOTES
TRANSFER - IN REPORT:    Verbal report received from [place] on Ronnie Old  being received from ED for routine progression of care      Report consisted of patients Situation, Background, Assessment and   Recommendations(SBAR). Information from the following report(s) SBAR, Kardex, ED Summary, Intake/Output, MAR, Recent Results, Med Rec Status and Cardiac Rhythm NSR was reviewed with the receiving nurse. Opportunity for questions and clarification was provided. Assessment completed upon patients arrival to unit and care assumed.

## 2018-05-03 NOTE — H&P
Hospitalist H&P/Consult Note     Admit Date:  2018  5:53 PM   Name:  Cheryle Wang   Age:  76 y.o.  :  1940   MRN:  [id number]   PCP:  Manohar Gutiérrez MD  Treatment Team: Attending Provider: Domonique Macedo MD    HPI:   75 y/o female who is still recuperating from a left hip surgery in November states that last Thursday she was using her rolling walker when overloaded the front of it whick led to her being unbalanced then tumbled upon a carpeted floor. She sustained abrasions left wrist and elbow and bruising left hip and leg. Also hit the back of her head on a plastic TV. She did not have LOC. Her  heard her fall and came to her aid. They called their PCP but it was too late in the day to be seen. Did end up having xrays on the  and had negative left hip, elbow, shoulder and knee. Yesterday though she began having trouble finding words and had some confusion. No other focal neurologic deficits.  was concerned so brought her to ED today. Head CT shows a small 5 mm thick left subdural fluid collection without midline shift. Neurosurgery called who felt non-surgical at present but recommended admission with repeat CT scan in am. Patient is not on any anticoagulants or aspirin. 10 systems reviewed and negative except as noted in HPI. Past Medical History:   Diagnosis Date    Anxiety     Arthritis     Cancer ([facility] [address]     Depression     History of bone density study 2016    Dr. Nimco Thomas    Hypercholesterolemia     Hypertension     Onychomycosis     Osteoporosis     Thyroid disease       Past Surgical History:   Procedure Laterality Date    BREAST SURGERY PROCEDURE UNLISTED      HX CHOLECYSTECTOMY      HX COLECTOMY      HX COLONOSCOPY      HX GYN      HX HEENT      HX ORTHOPAEDIC        Prior to Admission Medications   Prescriptions Last Dose Informant Patient Reported? Taking? BIOTIN PO   Yes No   Sig: Take 5,000 mcg by mouth daily.    DISABLED PLACARD (DISABLED PLACARD) DMV   Yes No   Sig: Supply prescription to [facility] with completed form [id number]. DULoxetine (CYMBALTA) 60 mg capsule   No No   Sig: Take 1 Cap by mouth two (2) times a day. HYSINGLA ER 40 mg ER tablet   Yes No   Sig: Take 1 Tab by mouth daily. PROAIR HFA 90 mcg/actuation inhaler   Yes No   Syringe with Needle, Safety 3 mL 23 gauge x 1\" syrg   Yes No   Sig: by Does Not Apply route. abaloparatide 80 mcg (3,120 mcg/1.56 mL) pnij   Yes No   Sig: Inject 0.04 mL (80 mcg) under the skin daily. amLODIPine (NORVASC) 5 mg tablet   No No   Sig: Take 1 Tab by mouth daily for 90 days. buPROPion XL (WELLBUTRIN XL) 150 mg tablet   No No   Sig: Take 1 Tab by mouth every morning for 90 days. calcium carbonate (TUMS) 200 mg calcium (500 mg) chew   Yes No   Sig: take 1 po TID   calcium-vitamin D 600 mg(1,500mg) -200 unit tab   Yes No   Sig: Take by mouth 2 (two) times a day. cefdinir (OMNICEF) 300 mg capsule   No No   Sig: Take 1 Cap by mouth two (2) times a day for 7 days. cholecalciferol, vitamin D3, 2,000 unit tab   Yes No   Sig: Take 2,000 Units by mouth daily. clonazePAM (KLONOPIN) 2 mg tablet   No No   Sig: Take 1 Tab by mouth nightly for 90 days. Max Daily Amount: 2 mg.   cyanocobalamin (VITAMIN B12) 1,000 mcg/mL injection   Yes No   Sig: Inject 1 mL as directed every 14 (fourteen) days. cyclobenzaprine (FLEXERIL) 10 mg tablet   Yes No   Sig: Take 10 mg by mouth every twelve (12) hours as needed for Muscle Spasm(s). diclofenac EC (VOLTAREN) 50 mg EC tablet   No No   Sig: Take 1 Tab by mouth two (2) times a day for 90 days. levothyroxine (SYNTHROID) 137 mcg tablet   No No   Sig: Take 137 mcg by mouth Daily (before breakfast) for 90 days. miSOPROStol (CYTOTEC) 200 mcg tablet   No No   Sig: Take 1 Tab by mouth two (2) times daily (with meals) for 90 days.    oxyCODONE IR (ROXICODONE) 5 mg immediate release tablet   No No   Sig: Take 0.5 Tabs by mouth every four (4) hours as needed for Pain. Max Daily Amount: 15 mg.   pantoprazole (PROTONIX) 40 mg tablet   No No   Sig: Take 1 Tab by mouth daily for 90 days. pravastatin (PRAVACHOL) 40 mg tablet   No No   Sig: Take 1 Tab by mouth nightly for 90 days. trimethoprim-sulfamethoxazole (BACTRIM DS, SEPTRA DS) 160-800 mg per tablet   No No   Sig: Take 1 Tab by mouth two (2) times a day for 10 days.       Facility-Administered Medications: None     Allergies   Allergen Reactions    Codeine Anaphylaxis    Iodine And Iodide Containing Products Anaphylaxis    Penicillins Anaphylaxis    Adhesive Unknown (comments)    Ammonium Lactate Other (comments)     Topical burn    Amoxicillin Other (comments)     Swelling internal and external    Glutamine-C-Quercet-Selen-Brom Unknown (comments)     Whole wheat flour    Honey Hives    Procaine Other (comments)     Severe mouth sores    Streptomycin Swelling and Hives      Social History   Substance Use Topics    Smoking status: Former Smoker    Smokeless tobacco: Never Used    Alcohol use No      Family History   Problem Relation Age of Onset    Heart Disease Mother     Heart Disease Father       Immunization History   Administered Date(s) Administered    Influenza High Dose Vaccine PF 09/10/2013, 11/24/2014, 10/05/2015, 10/19/2016, 02/11/2018    Influenza Vaccine 11/20/2012    Pneumococcal Conjugate (PCV-13) 10/19/2016    Pneumococcal Polysaccharide (PPSV-23) 12/27/2012    Tdap 10/06/2011    Zoster Vaccine, Live 07/23/2012       Objective:   Patient Vitals for the past 24 hrs:   Temp Pulse Resp BP SpO2   05/02/18 2300 - 82 12 145/72 96 %   05/02/18 2240 - 76 17 139/74 98 %   05/02/18 2230 - 78 (!) 37 153/84 94 %   05/02/18 2200 98.2 °F (36.8 °C) 75 16 145/87 92 %   05/02/18 2116 - 79 - - 97 %   05/02/18 2024 - 84 - - 97 %   05/02/18 2022 - 81 - 148/82 98 %   05/02/18 1757 97.5 °F (36.4 °C) 85 16 154/89 96 %     Oxygen Therapy  O2 Sat (%): 96 % (05/02/18 2300)  Pulse via Oximetry: 84 beats per minute (05/02/18 2300)  No intake or output data in the 24 hours ending 05/02/18 2318    Physical Exam:  General:    Well nourished. Alert. Some confusion, especially with remembering specific events and sequence of recent events. No receptive aphasia  Eyes:   Normal sclera. Extraocular movements intact. ENT:  Normocephalic, atraumatic. Moist mucous membranes  CV:   RRR. No murmur, rub, or gallop. Lungs:  CTAB. No wheezing, rhonchi, or rales. Abdomen: Soft, nontender, nondistended. Bowel sounds normal.   Extremities: Warm and dry. No cyanosis or edema. Neurologic: CN II-XII grossly intact. Sensation intact. Skin:     No rashes or jaundice. Healing abrasions left wrist and left elbow    Psych:  Normal mood and affect. I reviewed the labs, imaging, EKGs, telemetry, and other studies done this admission. Data Review:   Recent Results (from the past 24 hour(s))   CBC WITH AUTOMATED DIFF    Collection Time: 05/02/18  6:01 PM   Result Value Ref Range    WBC 8.5 4.3 - 11.1 K/uL    RBC 3.93 (L) 4.05 - 5.25 M/uL    HGB 11.8 11.7 - 15.4 g/dL    HCT 35.8 35.8 - 46.3 %    MCV 91.1 79.6 - 97.8 FL    MCH 30.0 26.1 - 32.9 PG    MCHC 33.0 31.4 - 35.0 g/dL    RDW 13.4 11.9 - 14.6 %    PLATELET 331 478 - 256 K/uL    MPV 9.0 (L) 10.8 - 14.1 FL    DF AUTOMATED      NEUTROPHILS 68 43 - 78 %    LYMPHOCYTES 19 13 - 44 %    MONOCYTES 9 4.0 - 12.0 %    EOSINOPHILS 4 0.5 - 7.8 %    BASOPHILS 0 0.0 - 2.0 %    IMMATURE GRANULOCYTES 0 0.0 - 5.0 %    ABS. NEUTROPHILS 5.8 1.7 - 8.2 K/UL    ABS. LYMPHOCYTES 1.6 0.5 - 4.6 K/UL    ABS. MONOCYTES 0.7 0.1 - 1.3 K/UL    ABS. EOSINOPHILS 0.3 0.0 - 0.8 K/UL    ABS. BASOPHILS 0.0 0.0 - 0.2 K/UL    ABS. IMM.  GRANS. 0.0 0.0 - 0.5 K/UL   METABOLIC PANEL, COMPREHENSIVE    Collection Time: 05/02/18  6:01 PM   Result Value Ref Range    Sodium 137 136 - 145 mmol/L    Potassium 3.5 3.5 - 5.1 mmol/L    Chloride 101 98 - 107 mmol/L    CO2 26 21 - 32 mmol/L    Anion gap 10 7 - 16 mmol/L Glucose 141 (H) 65 - 100 mg/dL    BUN 30 (H) 8 - 23 MG/DL    Creatinine 1.18 (H) 0.6 - 1.0 MG/DL    GFR est AA 57 (L) >60 ml/min/1.73m2    GFR est non-AA 47 (L) >60 ml/min/1.73m2    Calcium 9.0 8.3 - 10.4 MG/DL    Bilirubin, total 0.5 0.2 - 1.1 MG/DL    ALT (SGPT) 53 12 - 65 U/L    AST (SGOT) 58 (H) 15 - 37 U/L    Alk. phosphatase 96 50 - 136 U/L    Protein, total 7.2 6.3 - 8.2 g/dL    Albumin 3.3 3.2 - 4.6 g/dL    Globulin 3.9 (H) 2.3 - 3.5 g/dL    A-G Ratio 0.8 (L) 1.2 - 3.5     PROTHROMBIN TIME + INR    Collection Time: 05/02/18  6:01 PM   Result Value Ref Range    Prothrombin time 12.3 11.5 - 14.5 sec    INR 0.9     CK    Collection Time: 05/02/18  6:01 PM   Result Value Ref Range    CK 52 21 - 215 U/L   EKG, 12 LEAD, INITIAL    Collection Time: 05/02/18  6:01 PM   Result Value Ref Range    Ventricular Rate 80 BPM    Atrial Rate 86 BPM    QRS Duration 96 ms    Q-T Interval 346 ms    QTC Calculation (Bezet) 399 ms    Calculated R Axis 57 degrees    Calculated T Axis 112 degrees    Diagnosis       !! AGE AND GENDER SPECIFIC ECG ANALYSIS !! Atrial fibrillation  ST & T wave abnormality, consider lateral ischemia or digitalis effect  Abnormal ECG  When compared with ECG of 17-NOV-2014 22:11,  Atrial fibrillation has replaced Sinus rhythm  Questionable change in QRS axis  T wave inversion now evident in Lateral leads     TSH 3RD GENERATION    Collection Time: 05/02/18 10:05 PM   Result Value Ref Range    TSH 1.430 0.358 - 3.740 uIU/mL       Imaging Studies:  CXR Results  (Last 48 hours)               05/02/18 1839  XR CHEST PORT Final result    Impression:  IMPRESSION:  Negative for acute change. Narrative:  CHEST X-RAY, one view. HISTORY:  Slurred speech and confusion. TECHNIQUE:  AP upright portable view. COMPARISON: None. FINDINGS:   The lungs are clear. The heart size is normal. The costophrenic   angles are sharp. The pulmonary vasculature is unremarkable.  Included portion of   the upper abdomen is unremarkable. There are bilateral shoulder arthroplasties   and bilateral breast implants. CT Results  (Last 48 hours)               05/02/18 1954  CT HEAD WO CONT Final result    Impression:  IMPRESSION: Small, 5 mm thick left subdural fluid collection, subacute or   chronic subdural hematoma without midline shift. Left lateral ventricle is   partially effaced. No acute hemorrhage. The findings were called to Dr. Grecia Cleveland by myself at time of reporting. [phone]               Narrative:  CT HEAD WITHOUT CONTRAST. INDICATION: Abnormal speech. COMPARISON: None. TECHNIQUE:   5 mm axial scans from the skull base to the vertex. Our CT   scanners use one or more of the following:  Automated exposure control,   adjustment of the mA and or kV according to patient size, iterative   reconstruction. FINDINGS:  No acute intraparenchymal hemorrhage. There is a small subdural fluid   collection on the left which is low-attenuation and measures 5 mm thickness. No   midline shift. No skull fracture. Left lateral ventricle is partially effaced. Mild white matter low attenuation is present, nonspecific, likely chronic small   vessel disease. Included portion of the paranasal sinuses and orbits grossly   unremarkable.                  Assessment and Plan:     Hospital Problems as of 5/2/2018  Date Reviewed: 4/27/2018          Codes Class Noted - Resolved POA    * (Principal)[facility] ICD-10-CM: O39.5H7V  ICD-9-CM: 852.20  5/2/2018 - Present Yes        Essential hypertension ICD-10-CM: I10  ICD-9-CM: 401.9  10/5/2015 - Present Yes    Overview Signed 2/10/2017  2:29 PM by Didier Lal MD     Last Assessment & Plan:   Encouraged low sodium diet; Goal: take meds as prescribed, monitor blood pressure at home and call with readings             Anxiety disorder ICD-10-CM: F41.9  ICD-9-CM: 300.00  11/25/2015 - Present Yes    Overview Signed 2/10/2017  2:29 PM by Toña Resendez MD     Last Assessment & Plan:   1. Continue Klonopin 2 mg PO daily, Buspar 30 mg PO TID. 2. Effexor XR dose titration as above. Combined hyperlipidemia ICD-10-CM: E78.2  ICD-9-CM: 272.2  10/5/2015 - Present Yes    Overview Addendum 2/9/2018  2:10 PM by Toña Resendez MD     Last Assessment & Plan:   Formatting of this note may be different from the original.  Hyperlipidemia:  Patient was encouraged to follow low fat, low cholesterol diet in order to reduce risk of cardiovascular events or complications, such as MI, CVA, and PVD. Most recent lipid panel results were reviewed with patient at today's visit. Last Lipids (Total/LDL/HDL/TRG):   Lab Results   Component Value Date    CHOL 200* 04/08/2016    CHOL 209* 09/08/2015    CHOL 194 02/10/2015     Lab Results   Component Value Date    HDL 75 04/08/2016    HDL 87 09/08/2015    HDL 83 02/10/2015     Lab Results   Component Value Date    LDLCALC 109* 04/08/2016    LDLCALC 107* 09/08/2015    LDLCALC 98 02/10/2015     Lab Results   Component Value Date    TRIG 81 04/08/2016    TRIG 76 09/08/2015    TRIG 63 02/10/2015     The goal is to achieve total cholesterol <200 mg/dL, Triglycerides <150 mg/dL, HDL >40 mg/dL, LDL <100 mg/dL and chol/HDL ratio < 3.5. Last Assessment & Plan:   Formatting of this note may be different from the original.  Hyperlipidemia:  Patient was encouraged to follow low fat, low cholesterol diet in order to reduce risk of cardiovascular events or complications, such as MI, CVA, and PVD. Most recent lipid panel results were reviewed with patient at today's visit.     Last Lipids (Total/LDL/HDL/TRG):   Lab Results   Component Value Date    CHOL 200* 04/08/2016    CHOL 209* 09/08/2015    CHOL 194 02/10/2015     Lab Results   Component Value Date    HDL 75 04/08/2016    HDL 87 09/08/2015    HDL 83 02/10/2015     Lab Results   Component Value Date    LDLCALC 109* 04/08/2016    [facility] 107* 09/08/2015    LDLCALC 98 02/10/2015     Lab Results   Component Value Date    TRIG 81 04/08/2016    TRIG 76 09/08/2015    TRIG 63 02/10/2015     The goal is to achieve total cholesterol <200 mg/dL, Triglycerides <150 mg/dL, HDL >40 mg/dL, LDL <100 mg/dL and chol/HDL ratio < 3.5. Gastroesophageal reflux disease without esophagitis ICD-10-CM: K21.9  ICD-9-CM: 530.81  10/5/2015 - Present Yes    Overview Signed 2/10/2017  2:29 PM by Josefina Boo MD     Last Assessment & Plan:   Stable on present medications, continue as prescribed             Acquired atrophy of thyroid ICD-10-CM: E03.4  ICD-9-CM: 246.8  10/5/2015 - Present Yes    Overview Signed 2/10/2017  2:29 PM by Josefina Boo MD     Last Assessment & Plan: Will check TFT today; Goal: take medications as prescribed, take on an empty stomach, wait at least 30 minutes before eating or taking other medications                   PLAN:  · Admit to ICU  · Bedrest. Elevate head of bed  · Frequent neuro checks  · Strict BP control. Keep less than 140/90  · NO blood thinners or aspirin products  · SCDs for DVT prophylaxis  · Repeat head CT in am to evaluate for change. Initial CT discussed with Neurosurgery by ED and currently felt to be nonsurgical  · PT evaluation when medically stable as she has had falls  · PPD.  Case management consult for home needs  · Discussed with  at bedside      Estimated LOS:  2 midnights or more    Signed:  Sapna Nichols MD

## 2018-05-03 NOTE — PROGRESS NOTES
Bedside report from [place], RN. Dual skin and neuro assessment completed. Pt sleeping. Oriented x4. Speech slurred. Possible difficulty retrieving words. Pupils reactive.  equal. Equal plantar flexion and dorsiflexion. No facial droop. Tongue midline. Pt denies pain, nausea, numbness and tingling. Breath sounds clear. S1S2. Abdomen soft, intact. Active bowel sounds. SCDs. Large bruise to left hip. Skin tear to left elbow. Small scatter bruises to bilateral upper extremities and lower extremities. Allevyn in place. Pt turns self.

## 2018-05-03 NOTE — INTERDISCIPLINARY ROUNDS
Interdisciplinary team rounds were held 5/3/2018 with the following team members:Care Management, Nursing, Physical Therapy and Physician and the patient. Plan of care discussed. See clinical pathway and/or care plan for interventions and desired outcomes.

## 2018-05-03 NOTE — PROGRESS NOTES
Problem: Self Care Deficits Care Plan (Adult)  Goal: *Acute Goals and Plan of Care (Insert Text)  1. Patient will perform grooming with supervision. 2. Patient will perform Upper body dressing with supervision  3. Patient will perform lower body dressing with minimal assist.   4. Patient will perform upper and lower body bathing with minimal assist.   5. Patient will perform toilet transfers with CGA. 6. Patient will perform shower transfer with CGA. 7. Patient will participate in 30 + minutes of ADL/ therapeutic exercise/therapeutic activity with min rest breaks to increase activity tolerance for self care. 8. Patient will perform ADL functional mobility in room with CGA. Goals to be achieved in 7 days. OCCUPATIONAL THERAPY: Initial Assessment 5/3/2018  INPATIENT: Hospital Day: 2  [place] / [place] / Product Type: Medicare /      NAME/AGE/GENDER: Sandra Rios is a 76 y.o. female   PRIMARY DIAGNOSIS:  Subdural hematoma, post-traumatic (HCC) Subdural hematoma, post-traumatic (HCC) Subdural hematoma, post-traumatic (HCC)        ICD-10: Treatment Diagnosis:    · Generalized Muscle Weakness (M62.81)  · Other lack of cordination (R27.8)  · Difficulty in walking, Not elsewhere classified (R26.2)   Precautions/Allergies:     Codeine; Iodine and iodide containing products; Penicillins; Adhesive; Ammonium lactate; Amoxicillin; Glutamine-c-quercet-selen-brom; Honey; Procaine; and Streptomycin      ASSESSMENT:     Ms. Yael Rivera admitted with above diagnosis; pt presents with decreased self care, functional mobility, strength and endurance. Pt would benefit from skilled OT to increase independence. This section established at most recent assessment   PROBLEM LIST (Impairments causing functional limitations):  1. Decreased Strength  2. Decreased ADL/Functional Activities  3. Decreased Transfer Abilities  4. Decreased Ambulation Ability/Technique  5. Decreased Balance  6.  Decreased Activity Tolerance   INTERVENTIONS PLANNED: (Benefits and precautions of occupational therapy have been discussed with the patient.)  1. Activities of daily living training  2. Adaptive equipment training  3. Balance training  4. Clothing management  5. Therapeutic activity  6. Therapeutic exercise     TREATMENT PLAN: Frequency/Duration: Follow patient 3x/week to address above goals. Rehabilitation Potential For Stated Goals: Good     RECOMMENDED REHABILITATION/EQUIPMENT: (at time of discharge pending progress): Due to the probability of continued deficits (see above) this patient will likely need continued skilled occupational therapy after discharge. Equipment:    None at this time              OCCUPATIONAL PROFILE AND HISTORY:   History of Present Injury/Illness (Reason for Referral):  Subdural hematoma, post-traumatic   Past Medical History/Comorbidities:   Ms. Faraz Gage  has a past medical history of Anxiety; Arthritis; Cancer ([facility] [address] Depression; History of bone density study (08/2016); Hypercholesterolemia; Hypertension; Onychomycosis; Osteoporosis; and Thyroid disease. Ms. Faraz Gage  has a past surgical history that includes hx colectomy (2003); hx orthopaedic; pr breast surgery procedure unlisted; hx heent; hx cholecystectomy; hx gyn; and hx colonoscopy (2012).   Social History/Living Environment:   Home Environment: Private residence  One/Two Story Residence: One story  Living Alone: No  Support Systems: Family member(s), Spouse/Significant Other/Partner  Patient Expects to be Discharged to[de-identified] Private residence  Current DME Used/Available at Home: [address]  Prior Level of Function/Work/Activity:  Assist with adls and ambulated with rollator; lives with    Number of Personal Factors/Comorbidities that affect the Plan of Care: Brief history (0):  LOW COMPLEXITY   ASSESSMENT OF OCCUPATIONAL PERFORMANCE[de-identified]   Activities of Daily Living:           Basic ADLs (From Assessment) Complex ADLs (From Assessment)   Feeding: Setup  Oral Facial Hygiene/Grooming: Minimum assistance  Bathing: Maximum assistance  Upper Body Dressing: Minimum assistance  Lower Body Dressing: Maximum assistance  Toileting: Minimum assistance     Grooming/Bathing/Dressing Activities of Daily Living     Cognitive Retraining  Safety/Judgement: Awareness of environment; Fall prevention                 Functional Transfers  Toilet Transfer : Minimum assistance  Shower Transfer: Minimum assistance     Bed/Mat Mobility  Supine to Sit: Minimum assistance  Sit to Supine: Minimum assistance  Sit to Stand: Minimum assistance  Bed to Chair: Minimum assistance       Most Recent Physical Functioning:   Gross Assessment:  AROM: Generally decreased, functional (BUE)  Strength: Generally decreased, functional (BUE)  Coordination: Generally decreased, functional (BUE)  Tone: Normal  Sensation: Intact               Posture:     Balance:  Sitting: Intact  Standing: Pull to stand; With support Bed Mobility:  Supine to Sit: Minimum assistance  Sit to Supine: Minimum assistance  Wheelchair Mobility:     Transfers:  Sit to Stand: Minimum assistance  Stand to Sit: Minimum assistance  Bed to Chair: Minimum assistance              Patient Vitals for the past 6 hrs:   BP BP Patient Position SpO2 Pulse   05/03/18 0824 145/85 - - 75   05/03/18 0839 138/76 - 95 % 72   05/03/18 0854 136/76 - - 70   05/03/18 0909 136/74 - 93 % 73   05/03/18 0939 136/77 - (!) 86 % 83   05/03/18 1009 138/69 - 95 % 75   05/03/18 1039 156/82 - 96 % 78   05/03/18 1109 149/81 - 96 % 77   05/03/18 1139 143/73 At rest;Head of bed elevated (Comment degrees) 95 % 74   05/03/18 1307 148/82 - - 83       Mental Status  Neurologic State: Alert  Orientation Level: Oriented X4  Cognition: Follows commands  Perception: Appears intact  Perseveration: No perseveration noted  Safety/Judgement: Awareness of environment, Fall prevention                          Physical Skills Involved:  1. Balance  2. Strength  3. Activity Tolerance Cognitive Skills Affected (resulting in the inability to perform in a timely and safe manner): 1. none Psychosocial Skills Affected:  1. none   Number of elements that affect the Plan of Care: 1-3:  LOW COMPLEXITY   CLINICAL DECISION MAKIN[address] [place] [address] AM-PAC 6 Clicks   Daily Activity Inpatient Short Form  How much help from another person does the patient currently need. .. Total A Lot A Little None   1. Putting on and taking off regular lower body clothing? [] 1   [x] 2   [] 3   [] 4   2. Bathing (including washing, rinsing, drying)? [] 1   [x] 2   [] 3   [] 4   3. Toileting, which includes using toilet, bedpan or urinal?   [] 1   [x] 2   [] 3   [] 4   4. Putting on and taking off regular upper body clothing? [] 1   [x] 2   [] 3   [] 4   5. Taking care of personal grooming such as brushing teeth? [] 1   [] 2   [x] 3   [] 4   6. Eating meals? [] 1   [] 2   [x] 3   [] 4   © , Trustees of [address] [place] [address], under license to [employer]. All rights reserved      Score:  Initial: 14 Most Recent: X (Date: -- )    Interpretation of Tool:  Represents activities that are increasingly more difficult (i.e. Bed mobility, Transfers, Gait). Score 24 23 22-20 19-15 14-10 9-7 6     Modifier CH CI CJ CK CL CM CN      ? Self Care:     - CURRENT STATUS: CL - 60%-79% impaired, limited or restricted    - GOAL STATUS: CK - 40%-59% impaired, limited or restricted    - D/C STATUS:  ---------------To be determined---------------  [place] / Product Type: Medicare /      Medical Necessity:     · Patient is expected to demonstrate progress in strength, balance, coordination and functional technique to increase independence with self care and functional mobility. Reason for Services/Other Comments:  · Patient continues to require skilled intervention due to decrease self care and mobility. Use of outcome tool(s) and clinical judgement create a POC that gives a: LOW COMPLEXITY         TREATMENT:   (In addition to Assessment/Re-Assessment sessions the following treatments were rendered)     Pre-treatment Symptoms/Complaints:  No complaints  Pain: Initial:   Pain Intensity 1: 4  Pain Location 1: Arm  Pain Orientation 1: Left  Pain Intervention(s) 1: Repositioned  Post Session:  4     Assessment/Reassessment only, no treatment provided today    Braces/Orthotics/Lines/Etc:   · telemetry  · O2 Device: Room air  Treatment/Session Assessment:    · Response to Treatment:  Tolerated sitting up in chair  · Interdisciplinary Collaboration:   o Physical Therapist  o Registered Nurse  · After treatment position/precautions:   o Up in chair  o Bed alarm/tab alert on  o Bed/Chair-wheels locked  o Caregiver at bedside  o Call light within reach  o RN notified  o LEs reclined   · Compliance with Program/Exercises: compliant all of the time. · Recommendations/Intent for next treatment session: \"Next visit will focus on advancements to more challenging activities and reduction in assistance provided\".   Total Treatment Duration:  OT Patient Time In/Time Out  Time In: 1305  Time Out: [address]

## 2018-05-03 NOTE — PROGRESS NOTES
Sw received a message from PT that pt would benefit from a consult to 9th floor Inpt Rehab and/or SNF depending on what pt/family desire. Md to place consult and await eval. Jessi Zaldivar/BJORN Marinelli met pt and her  this am during care rounds. Pt is a 76 yr old female adm on 5/2 due to subdural hematoma after falling at home. Pt is normally functional with a rollator and  uses a cane. Md informed pt we are still waiting for CT results and if no further bleeding she could start therapies. Pt seemed anxious to be d/c home.  aware of the need to further evaluate her medical condition. Sw will follow and determine if home health Aide and therapy would be indicated for strength and endurance. Pt will need to stay in ICU until Fri am when her 24 hrs are up.  Polina Engel

## 2018-05-03 NOTE — ED NOTES
TRANSFER - OUT REPORT:    Verbal report given to Theresa on Ira Madrigal  being transferred to ICU for routine progression of care       Report consisted of patients Situation, Background, Assessment and   Recommendations(SBAR). Information from the following report(s) SBAR and ED Summary was reviewed with the receiving nurse. Lines:   Peripheral IV 05/02/18 Right Antecubital (Active)   Site Assessment Clean, dry, & intact 5/2/2018  6:26 PM        Opportunity for questions and clarification was provided.       Patient transported with:   Registered Nurse

## 2018-05-03 NOTE — PROGRESS NOTES
Hospitalist Progress Note    5/3/2018  Admit Date: 2018  5:53 PM   NAME: Amairani Sarabia   :  1940   MRN:  [id number]   Attending: Lewis Guerrero MD  PCP:  Elida Johnson MD    SUBJECTIVE:     Amairani Sarabia is a 72yoF who was admitted on  after a fall at home on Thursday. Began having some confusion, and was found to have 5mm SDH on CT head. Images reviewed by NSGY and bleed was non-surgical. Admitted to ICU for further monitoring. 5/3: some mild slurred speech this AM. No confusion. Does have a HA, but no vision changes. Review of Systems negative with exception of pertinent positives noted above      PHYSICAL EXAM       Visit Vitals    /76    Pulse 70    Temp 98.1 °F (36.7 °C)    Resp 11    Ht 5' 8\" (1.727 m)    Wt 74.8 kg (164 lb 12.8 oz)    SpO2 93%    BMI 25.06 kg/m2      Temp (24hrs), Av.8 °F (36.6 °C), Min:97.5 °F (36.4 °C), Max:98.2 °F (36.8 °C)    Oxygen Therapy  O2 Sat (%): 93 % (18 0739)  Pulse via Oximetry: 71 beats per minute (18 0739)  O2 Device: Room air (18 0709)    Intake/Output Summary (Last 24 hours) at 18 0935  Last data filed at 18 0805   Gross per 24 hour   Intake              860 ml   Output             1950 ml   Net            -1090 ml          General: No acute distress. Head:  Atraumatic Normocephalic. Lungs:  CTA Bilaterally. CVS:  RRR  Abdomen: Soft, NTTP  MSK:  Spontaneously moves extremities. Neurologic:  AOx3.  No focal deficits; dysarthria resolved  Psychiatry:      No anxiety/Depression  Skin:   Abrasions on L arm      Recent Results (from the past 24 hour(s))   CBC WITH AUTOMATED DIFF    Collection Time: 18  6:01 PM   Result Value Ref Range    WBC 8.5 4.3 - 11.1 K/uL    RBC 3.93 (L) 4.05 - 5.25 M/uL    HGB 11.8 11.7 - 15.4 g/dL    HCT 35.8 35.8 - 46.3 %    MCV 91.1 79.6 - 97.8 FL    MCH 30.0 26.1 - 32.9 PG    MCHC 33.0 31.4 - 35.0 g/dL    RDW 13.4 11.9 - 14.6 %    PLATELET 936 320 - 573 K/uL    MPV 9.0 (L) 10.8 - 14.1 FL    DF AUTOMATED      NEUTROPHILS 68 43 - 78 %    LYMPHOCYTES 19 13 - 44 %    MONOCYTES 9 4.0 - 12.0 %    EOSINOPHILS 4 0.5 - 7.8 %    BASOPHILS 0 0.0 - 2.0 %    IMMATURE GRANULOCYTES 0 0.0 - 5.0 %    ABS. NEUTROPHILS 5.8 1.7 - 8.2 K/UL    ABS. LYMPHOCYTES 1.6 0.5 - 4.6 K/UL    ABS. MONOCYTES 0.7 0.1 - 1.3 K/UL    ABS. EOSINOPHILS 0.3 0.0 - 0.8 K/UL    ABS. BASOPHILS 0.0 0.0 - 0.2 K/UL    ABS. IMM. GRANS. 0.0 0.0 - 0.5 K/UL   METABOLIC PANEL, COMPREHENSIVE    Collection Time: 05/02/18  6:01 PM   Result Value Ref Range    Sodium 137 136 - 145 mmol/L    Potassium 3.5 3.5 - 5.1 mmol/L    Chloride 101 98 - 107 mmol/L    CO2 26 21 - 32 mmol/L    Anion gap 10 7 - 16 mmol/L    Glucose 141 (H) 65 - 100 mg/dL    BUN 30 (H) 8 - 23 MG/DL    Creatinine 1.18 (H) 0.6 - 1.0 MG/DL    GFR est AA 57 (L) >60 ml/min/1.73m2    GFR est non-AA 47 (L) >60 ml/min/1.73m2    Calcium 9.0 8.3 - 10.4 MG/DL    Bilirubin, total 0.5 0.2 - 1.1 MG/DL    ALT (SGPT) 53 12 - 65 U/L    AST (SGOT) 58 (H) 15 - 37 U/L    Alk. phosphatase 96 50 - 136 U/L    Protein, total 7.2 6.3 - 8.2 g/dL    Albumin 3.3 3.2 - 4.6 g/dL    Globulin 3.9 (H) 2.3 - 3.5 g/dL    A-G Ratio 0.8 (L) 1.2 - 3.5     PROTHROMBIN TIME + INR    Collection Time: 05/02/18  6:01 PM   Result Value Ref Range    Prothrombin time 12.3 11.5 - 14.5 sec    INR 0.9     CK    Collection Time: 05/02/18  6:01 PM   Result Value Ref Range    CK 52 21 - 215 U/L   EKG, 12 LEAD, INITIAL    Collection Time: 05/02/18  6:01 PM   Result Value Ref Range    Ventricular Rate 80 BPM    Atrial Rate 86 BPM    QRS Duration 96 ms    Q-T Interval 346 ms    QTC Calculation (Bezet) 399 ms    Calculated R Axis 57 degrees    Calculated T Axis 112 degrees    Diagnosis       !! AGE AND GENDER SPECIFIC ECG ANALYSIS !!   Atrial fibrillation  ST & T wave abnormality, consider lateral ischemia or digitalis effect  Abnormal ECG  When compared with ECG of 17-NOV-2014 22:11,  Atrial fibrillation has replaced Sinus rhythm  Questionable change in QRS axis  T wave inversion now evident in Lateral leads  Confirmed by BESS GAR (), Bin Arredondo ([id number]) on 5/3/2018 6:26:57 AM     TSH 3RD GENERATION    Collection Time: 05/02/18 10:05 PM   Result Value Ref Range    TSH 1.430 0.358 - 3.740 uIU/mL   MAGNESIUM    Collection Time: 05/03/18  4:13 AM   Result Value Ref Range    Magnesium 2.2 1.8 - 2.4 mg/dL   PHOSPHORUS    Collection Time: 05/03/18  4:13 AM   Result Value Ref Range    Phosphorus 3.3 2.3 - 3.7 MG/DL   METABOLIC PANEL, BASIC    Collection Time: 05/03/18  4:13 AM   Result Value Ref Range    Sodium 142 136 - 145 mmol/L    Potassium 3.5 3.5 - 5.1 mmol/L    Chloride 107 98 - 107 mmol/L    CO2 25 21 - 32 mmol/L    Anion gap 10 7 - 16 mmol/L    Glucose 87 65 - 100 mg/dL    BUN 21 8 - 23 MG/DL    Creatinine 0.98 0.6 - 1.0 MG/DL    GFR est AA >60 >60 ml/min/1.73m2    GFR est non-AA 58 (L) >60 ml/min/1.73m2    Calcium 8.1 (L) 8.3 - 10.4 MG/DL         Imaging /Procedures /Studies   CT HEAD WO CONT   Final Result   IMPRESSION: Small, 5 mm thick left subdural fluid collection, subacute or   chronic subdural hematoma without midline shift. Left lateral ventricle is   partially effaced. No acute hemorrhage. The findings were called to Dr. Lilia Carrasquillo by myself at time of reporting. 789            XR ELBOW LT MIN 3 V   Final Result   IMPRESSION: Negative for acute fracture. XR CHEST PORT   Final Result   IMPRESSION:  Negative for acute change. CT HEAD WO CONT    (Results Pending)         ASSESSMENT      Hospital Problems as of 5/3/2018  Date Reviewed: 4/27/2018          Codes Class Noted - Resolved POA    * (Principal)Subdural hematoma, post-traumatic ([facility] [address] ICD-10-CM: R84.5W8E  ICD-9-CM: 852.20  5/2/2018 - Present Yes        Anxiety disorder ICD-10-CM: F41.9  ICD-9-CM: 300.00  11/25/2015 - Present Yes    Overview Signed 2/10/2017  2:29 PM by Angela Hunt MD     Last Assessment & Plan:   1.  Continue Klonopin 2 mg PO daily, Buspar 30 mg PO TID. 2. Effexor XR dose titration as above. Combined hyperlipidemia ICD-10-CM: E78.2  ICD-9-CM: 272.2  10/5/2015 - Present Yes    Overview Addendum 2/9/2018  2:10 PM by Hattie Duarte MD     Last Assessment & Plan:   Formatting of this note may be different from the original.  Hyperlipidemia:  Patient was encouraged to follow low fat, low cholesterol diet in order to reduce risk of cardiovascular events or complications, such as MI, CVA, and PVD. Most recent lipid panel results were reviewed with patient at today's visit. Last Lipids (Total/LDL/HDL/TRG):   Lab Results   Component Value Date    CHOL 200* 04/08/2016    CHOL 209* 09/08/2015    CHOL 194 02/10/2015     Lab Results   Component Value Date    HDL 75 04/08/2016    HDL 87 09/08/2015    HDL 83 02/10/2015     Lab Results   Component Value Date    LDLCALC 109* 04/08/2016    LDLCALC 107* 09/08/2015    LDLCALC 98 02/10/2015     Lab Results   Component Value Date    TRIG 81 04/08/2016    TRIG 76 09/08/2015    TRIG 63 02/10/2015     The goal is to achieve total cholesterol <200 mg/dL, Triglycerides <150 mg/dL, HDL >40 mg/dL, LDL <100 mg/dL and chol/HDL ratio < 3.5. Last Assessment & Plan:   Formatting of this note may be different from the original.  Hyperlipidemia:  Patient was encouraged to follow low fat, low cholesterol diet in order to reduce risk of cardiovascular events or complications, such as MI, CVA, and PVD. Most recent lipid panel results were reviewed with patient at today's visit.     Last Lipids (Total/LDL/HDL/TRG):   Lab Results   Component Value Date    CHOL 200* 04/08/2016    CHOL 209* 09/08/2015    CHOL 194 02/10/2015     Lab Results   Component Value Date    HDL 75 04/08/2016    HDL 87 09/08/2015    HDL 83 02/10/2015     Lab Results   Component Value Date    LDLCALC 109* 04/08/2016    LDLCALC 107* 09/08/2015    LDLCALC 98 02/10/2015     Lab Results   Component Value Date    TRIG 81 04/08/2016    TRIG 76 09/08/2015    TRIG 63 02/10/2015     The goal is to achieve total cholesterol <200 mg/dL, Triglycerides <150 mg/dL, HDL >40 mg/dL, LDL <100 mg/dL and chol/HDL ratio < 3.5. Gastroesophageal reflux disease without esophagitis ICD-10-CM: K21.9  ICD-9-CM: 530.81  10/5/2015 - Present Yes    Overview Signed 2/10/2017  2:29 PM by Candice Cárdenas MD     Last Assessment & Plan:   Stable on present medications, continue as prescribed             Essential hypertension ICD-10-CM: I10  ICD-9-CM: 401.9  10/5/2015 - Present Yes    Overview Signed 2/10/2017  2:29 PM by Candice Cárdenas MD     Last Assessment & Plan:   Encouraged low sodium diet; Goal: take meds as prescribed, monitor blood pressure at home and call with readings             Acquired atrophy of thyroid ICD-10-CM: E03.4  ICD-9-CM: 246.8  10/5/2015 - Present Yes    Overview Signed 2/10/2017  2:29 PM by Candice Cárdenas MD     Last Assessment & Plan:    Will check TFT today; Goal: take medications as prescribed, take on an empty stomach, wait at least 30 minutes before eating or taking other medications                       Plan:  - acute to subacute SDH:  Repeat CT stable this AM  Non-surgical per neurosurgery evaluation of images  Strict BP monitoring, goal < 140/90  Tx out of ICU at 24hrs    - HA:  Likely related to SDH  Patient reports that she does not tolerate tylenol, so will start home oxy    - Polypharmacy:   Concerning given fall at home and confusion  On scheduled opiates and benzos, which are high risk in the elderly population  Will need to be further addressed by her PCP    - HTN:  continue norvasc  hydral prn to keep BP as low as needed    - Hypothyroidism:  Continue synthroid    - Depression/anxiety:  Continue home wellbutrin, buspar, cymbalta and klonopin     DVT Prophylaxis: SCDs  Dispo: pending PT/OT eval; PPD placed    Anaya Root MD

## 2018-05-03 NOTE — PROGRESS NOTES
Care Management Interventions  PCP Verified by CM: Yes  Mode of Transport at Discharge:  Other (see comment)  Transition of Care Consult (CM Consult): SNF  Partner SNF: Yes  Physical Therapy Consult: Yes  Occupational Therapy Consult: Yes  Current Support Network: Lives with Spouse  Confirm Follow Up Transport: Family  Plan discussed with Pt/Family/Caregiver: Yes  Freedom of Choice Offered: Yes  Discharge Location  Discharge Placement: Skilled nursing facility  Referral sent to Physiatry, and the [place]

## 2018-05-03 NOTE — CONSULTS
Physical Medicine & Rehabilitation Note-consult    Patient: Mc Fowler MRN: [id number]  SSN: [id number]    YOB: 1940  Age: 76 y.o. Sex: female      Admit Date: 5/2/2018  Admitting Physician: Gilda Regalado MD    Medical Decision Making/Plan/Recommend: Functional deficit, mobility, gait impairment. Generalized weakness,secondary to arthritic, recent SDH. Continue acute PT, OT. Continue gait training, transfer training, balance activities, exercises to improve strength and balance to maximize ADLs and functional mobility. Patient requesting rehab at [facility]. Requesting the cascades. Thank you for the opportunity to participate in the care of this patient. Chief Complaint : Gait dysfunction secondary to below. Admit Diagnosis: Subdural hematoma, post-traumatic (HCC)   Subdural hematoma, post-traumatic (HCC) (5/2/2018)  Combined hyperlipidemia  Gastroesophageal reflux disease without esophagitis   Essential hypertension   Acquired atrophy of thyroid   Anxiety disorder   weakness  Pain  DVT risk  Post op anemia  Acute Rehab Dx:  weakness  Debility    deconditioning  Mobility and ambulation deficits  Self Care/ADL deficits    Medical Dx:  Past Medical History:   Diagnosis Date    Anxiety     Arthritis     Cancer ([facility] [address]     Depression     History of bone density study 08/2016    Dr. Daniel Weinstein    Hypercholesterolemia     Hypertension     Onychomycosis     Osteoporosis     Thyroid disease      Subjective:     Date of Evaluation:  May 4, 2018    HPI: Mc Fowler is a 76 y.o. female patient at [address] [place] [address] who was admitted on 5/2/2018  by Gilda Regalado MD with below mentioned medical history ,is being seen for Physical Medicine and Rehabilitation consult. Mc Fowler who had a fall at home, apparently hi her head on the TV presented with confusion.  CT head showed a small, 5 mm thick left subdural fluid collection, subacute or chronic subdural hematoma without midline shift. Patient was admitted with diagnosis of SDH and started on medical management. Case was reviewed by neurosurgery and was decided non surgical.    Patient has started to participate in therapies with acute PT, OT and ST. Patient has decreased generalized strength, poor endurance, impaired balance due affecting her mobility and gait. She is at minimum assist level for mobility and ambulation. Sonu Madrigal is seen and examined today. Medical Records reviewed. She has advanced arthritis, and a hip fracture/ repair last year that she was recovering from. She states she was improving through continued PT, was ambulating with a rollator at baseline. Current Level of Function:   bed mobility - min A, transfers - min A, decreased balance , ambulation - min assist.     Prior Level of Function/Work/Activity:  Able to amb with a rollator, but needed assist with ADLs and had been spending more time in bed recently        Family History   Problem Relation Age of Onset    Heart Disease Mother     Heart Disease Father       Social History   Substance Use Topics    Smoking status: Former Smoker    Smokeless tobacco: Never Used    Alcohol use No     Past Surgical History:   Procedure Laterality Date    BREAST SURGERY PROCEDURE UNLISTED      HX CHOLECYSTECTOMY      HX COLECTOMY  2003    HX COLONOSCOPY  2012    HX GYN      HX HEENT      HX ORTHOPAEDIC        Prior to Admission medications    Medication Sig Start Date End Date Taking? Authorizing Provider   naloxone (EVZIO) 2 mg/0.4 mL auto-injector 0.4 mL by IntraMUSCular route once as needed for Overdose for up to 1 dose. 5/2/18  Yes Adenike Lima MD   busPIRone (BUSPAR) 30 mg tablet Take 30 mg by mouth three (3) times daily. Yes Historical Provider   hydrocortisone (CORTAID) 0.5 % topical cream Apply 2.5 Each to affected area two (2) times a day.  use thin layer   Indications: apply to face skin   Yes Historical Provider triamcinolone acetonide (KENALOG) 0.025 % topical cream Apply 2.5 Each to affected area two (2) times a day. use thin layer   Indications: Allergic Dermatitis, apply to other body parts   Yes Historical Provider   glucosamine/chondr maradiaga A sod (GLUCOSAMINE-CHONDROITIN) 750-600 mg tab Take  by mouth two (2) times a day. Yes Historical Provider   ascorbic acid, vitamin C, (VITAMIN C) 500 mg tablet Take 500 mg by mouth daily. Yes Historical Provider   oxyCODONE IR (ROXICODONE) 5 mg immediate release tablet Take 0.5 Tabs by mouth every four (4) hours as needed for Pain. Max Daily Amount: 15 mg. 4/28/18   Carter Guy MD   calcium carbonate (TUMS) 200 mg calcium (500 mg) chew take 1 po TID    Historical Provider   cyclobenzaprine (FLEXERIL) 10 mg tablet Take 10 mg by mouth every twelve (12) hours as needed for Muscle Spasm(s). Historical Provider   trimethoprim-sulfamethoxazole (BACTRIM DS, SEPTRA DS) 160-800 mg per tablet Take 1 Tab by mouth two (2) times a day for 10 days. 4/27/18 5/7/18  Eric Mckeon MD   cefdinir (OMNICEF) 300 mg capsule Take 1 Cap by mouth two (2) times a day for 7 days. 4/27/18 5/4/18  Eric Mckeon MD   buPROPion XL (WELLBUTRIN XL) 150 mg tablet Take 1 Tab by mouth every morning for 90 days. 4/27/18 7/26/18  Eric Mckeon MD   clonazePAM (KLONOPIN) 2 mg tablet Take 1 Tab by mouth nightly for 90 days. Max Daily Amount: 2 mg. 4/27/18 7/26/18  Eric Mckeon MD   levothyroxine (SYNTHROID) 137 mcg tablet Take 137 mcg by mouth Daily (before breakfast) for 90 days. 4/27/18 7/26/18  Eric Mckeon MD   pantoprazole (PROTONIX) 40 mg tablet Take 1 Tab by mouth daily for 90 days. 4/27/18 7/26/18  Eric Mckeon MD   cyanocobalamin (VITAMIN B12) 1,000 mcg/mL injection Inject 1 mL as directed every 14 (fourteen) days. Historical Provider   abaloparatide 80 mcg (3,120 mcg/1.56 mL) pnij Inject 0.04 mL (80 mcg) under the skin daily.  1/3/18   Historical Provider   diclofenac EC (VOLTAREN) 50 mg EC tablet Take 1 Tab by mouth two (2) times a day for 90 days. 2/9/18 5/10/18  Bart Lauren MD   miSOPROStol (CYTOTEC) 200 mcg tablet Take 1 Tab by mouth two (2) times daily (with meals) for 90 days. 2/9/18 5/10/18  Bart Lauren MD   pravastatin (PRAVACHOL) 40 mg tablet Take 1 Tab by mouth nightly for 90 days. 2/9/18 5/10/18  Bart Lauren MD   amLODIPine (NORVASC) 5 mg tablet Take 1 Tab by mouth daily for 90 days. 2/9/18 5/10/18  Bart Lauren MD   DISABLED PLACARD (DISABLED PLACARD) DMV Supply prescription to [facility] with completed form RG-007A. Historical Provider   Syringe with Needle, Safety 3 mL 23 gauge x 1\" syrg by Does Not Apply route. Historical Provider   cholecalciferol, vitamin D3, 2,000 unit tab Take 2,000 Units by mouth daily. Historical Provider   calcium-vitamin D 600 mg(1,500mg) -200 unit tab Take by mouth 2 (two) times a day. Historical Provider   [facility] ER 40 mg ER tablet Take 1 Tab by mouth daily. 4/10/17   Historical Provider   DULoxetine (CYMBALTA) 60 mg capsule Take 1 Cap by mouth two (2) times a day. 6/1/17 6/1/18  Bart Lauren MD   PROAIR HFA 90 mcg/actuation inhaler  11/18/16   Historical Provider   BIOTIN PO Take 5,000 mcg by mouth daily. Historical Provider     Allergies   Allergen Reactions    Codeine Anaphylaxis    Iodine And Iodide Containing Products Anaphylaxis    Penicillins Anaphylaxis    Adhesive Unknown (comments)    Ammonium Lactate Other (comments)     Topical burn    Amoxicillin Other (comments)     Swelling internal and external    Glutamine-C-Quercet-Selen-Brom Unknown (comments)     Whole wheat flour    Honey Hives    Procaine Other (comments)     Severe mouth sores    Streptomycin Swelling and Hives        Review of Systems: Denies chest pain, shortness of breath, cough, headache, visual problems, abdominal pain, dysurea, calf pain.  Pertinent positives are as noted in the medical records and unremarkable otherwise. Objective:     Vitals:  Blood pressure 136/76, pulse 76, temperature 97.6 °F (36.4 °C), resp. rate 18, height 5' 8\" (1.727 m), weight 164 lb 12.8 oz (74.8 kg), SpO2 95 %. Temp (24hrs), Av.2 °F (36.8 °C), Min:97.4 °F (36.3 °C), Max:99.7 °F (37.6 °C)    BMI (calculated): 25.1 (18 0009)   Intake and Output:   1901 -  0700  In: 7474 [P.O.:600; I.V.:860]  Out: 3500 [Urine:3500]    Physical Exam:   General: Alert and age appropriately oriented. No acute cardio respiratory distress. HEENT: Normocephalic,no scleral icterus  Oral mucosa moist without cyanosis, No bruit, No JVD. Lungs: Clear to auscultation  bilaterally. Respiration even and unlabored   Heart: Regular rate and rhythm, S1, S2   No  murmurs, clicks, rub or gallops   Abdomen: Soft, non-tender, nondistended. Bowel sounds present. No organomegaly. Genitourinary: deferred   Neuromuscular:      PERRL, EOMI  Follows simple commands consistently. Able to identify, recall repeat. Mood appropriate. Insight, judgement intact. LUE     Shoulder abduction   5-/5              Elbow flexion:   5-/5               Wrist extension:   5-/5              Finger flexion;   4+/5   ADMQ:  4+/5  RUE    Shoulder abduction: 5- /5                Elbow flexion:  5- /5    Wrist extension: 5- /5   Finger flexion:  4+/5   ADMQ:   4+/5  LLE     Hip flexion:  4 /5              Knee extension:  4 /5    Ankle dorsiflexion:  4+ /5   Ankle plantarflexion:   5-/5        RLE     Hip flexion:   4+/5   Knee extension:  5- /5    Ankle dorsiflexion:   5-/5   Ankle plantarflexion:  5- /5  Sensory - intact  No cerebellar signs. No atrophy, no fasciculations, no tremors. Skin/extremity: No rashes, no erythema. Calf non tender BLE.                                                                                         Labs/Studies:  Recent Results (from the past 72 hour(s))   CBC WITH AUTOMATED DIFF    Collection Time: 18  6:01 PM   Result Value Ref Range    WBC 8.5 4.3 - 11.1 K/uL    RBC 3.93 (L) 4.05 - 5.25 M/uL    HGB 11.8 11.7 - 15.4 g/dL    HCT 35.8 35.8 - 46.3 %    MCV 91.1 79.6 - 97.8 FL    MCH 30.0 26.1 - 32.9 PG    MCHC 33.0 31.4 - 35.0 g/dL    RDW 13.4 11.9 - 14.6 %    PLATELET 222 106 - 756 K/uL    MPV 9.0 (L) 10.8 - 14.1 FL    DF AUTOMATED      NEUTROPHILS 68 43 - 78 %    LYMPHOCYTES 19 13 - 44 %    MONOCYTES 9 4.0 - 12.0 %    EOSINOPHILS 4 0.5 - 7.8 %    BASOPHILS 0 0.0 - 2.0 %    IMMATURE GRANULOCYTES 0 0.0 - 5.0 %    ABS. NEUTROPHILS 5.8 1.7 - 8.2 K/UL    ABS. LYMPHOCYTES 1.6 0.5 - 4.6 K/UL    ABS. MONOCYTES 0.7 0.1 - 1.3 K/UL    ABS. EOSINOPHILS 0.3 0.0 - 0.8 K/UL    ABS. BASOPHILS 0.0 0.0 - 0.2 K/UL    ABS. IMM. GRANS. 0.0 0.0 - 0.5 K/UL   METABOLIC PANEL, COMPREHENSIVE    Collection Time: 05/02/18  6:01 PM   Result Value Ref Range    Sodium 137 136 - 145 mmol/L    Potassium 3.5 3.5 - 5.1 mmol/L    Chloride 101 98 - 107 mmol/L    CO2 26 21 - 32 mmol/L    Anion gap 10 7 - 16 mmol/L    Glucose 141 (H) 65 - 100 mg/dL    BUN 30 (H) 8 - 23 MG/DL    Creatinine 1.18 (H) 0.6 - 1.0 MG/DL    GFR est AA 57 (L) >60 ml/min/1.73m2    GFR est non-AA 47 (L) >60 ml/min/1.73m2    Calcium 9.0 8.3 - 10.4 MG/DL    Bilirubin, total 0.5 0.2 - 1.1 MG/DL    ALT (SGPT) 53 12 - 65 U/L    AST (SGOT) 58 (H) 15 - 37 U/L    Alk.  phosphatase 96 50 - 136 U/L    Protein, total 7.2 6.3 - 8.2 g/dL    Albumin 3.3 3.2 - 4.6 g/dL    Globulin 3.9 (H) 2.3 - 3.5 g/dL    A-G Ratio 0.8 (L) 1.2 - 3.5     PROTHROMBIN TIME + INR    Collection Time: 05/02/18  6:01 PM   Result Value Ref Range    Prothrombin time 12.3 11.5 - 14.5 sec    INR 0.9     CK    Collection Time: 05/02/18  6:01 PM   Result Value Ref Range    CK 52 21 - 215 U/L   EKG, 12 LEAD, INITIAL    Collection Time: 05/02/18  6:01 PM   Result Value Ref Range    Ventricular Rate 80 BPM    Atrial Rate 86 BPM    QRS Duration 96 ms    Q-T Interval 346 ms    QTC Calculation (Bezet) 399 ms    Calculated R Axis 57 degrees    Calculated T Axis 112 degrees    Diagnosis       !! AGE AND GENDER SPECIFIC ECG ANALYSIS !! Atrial fibrillation  ST & T wave abnormality, consider lateral ischemia or digitalis effect  Abnormal ECG  When compared with ECG of 17-NOV-2014 22:11,  Atrial fibrillation has replaced Sinus rhythm  Questionable change in QRS axis  T wave inversion now evident in Lateral leads  Confirmed by BESS GAR (), Singh Do ([id number]) on 5/3/2018 6:26:57 AM     TSH 3RD GENERATION    Collection Time: 05/02/18 10:05 PM   Result Value Ref Range    TSH 1.430 0.358 - 3.740 uIU/mL   MAGNESIUM    Collection Time: 05/03/18  4:13 AM   Result Value Ref Range    Magnesium 2.2 1.8 - 2.4 mg/dL   PHOSPHORUS    Collection Time: 05/03/18  4:13 AM   Result Value Ref Range    Phosphorus 3.3 2.3 - 3.7 MG/DL   METABOLIC PANEL, BASIC    Collection Time: 05/03/18  4:13 AM   Result Value Ref Range    Sodium 142 136 - 145 mmol/L    Potassium 3.5 3.5 - 5.1 mmol/L    Chloride 107 98 - 107 mmol/L    CO2 25 21 - 32 mmol/L    Anion gap 10 7 - 16 mmol/L    Glucose 87 65 - 100 mg/dL    BUN 21 8 - 23 MG/DL    Creatinine 0.98 0.6 - 1.0 MG/DL    GFR est AA >60 >60 ml/min/1.73m2    GFR est non-AA 58 (L) >60 ml/min/1.73m2    Calcium 8.1 (L) 8.3 - 10.4 MG/DL   PLEASE READ & DOCUMENT PPD TEST IN 24 HRS    Collection Time: 05/04/18 12:18 AM   Result Value Ref Range    PPD  Negative    mm Induration 0 mm       Functional Assessment:  Reviewed participation and progress in therapies  Gross Assessment  AROM: Generally decreased, functional (05/03/18 1341)  Strength: Generally decreased, functional (R LE 3+/5, L hip 3-; knee/ankle 3+/5) (05/03/18 1341)  Coordination: Generally decreased, functional (BUE) (05/03/18 1338)  Tone: Normal (05/03/18 1338)  Sensation: Intact (05/03/18 1341)   Bed Mobility  Supine to Sit: Minimum assistance (05/04/18 0800)  Sit to Supine: Minimum assistance (05/03/18 1338)   Balance  Sitting: Intact (05/04/18 0800)  Standing: Pull to stand; With support (05/04/18 0800) Bed/Mat Mobility  Supine to Sit: Minimum assistance (05/04/18 0800)  Sit to Supine: Minimum assistance (05/03/18 1338)  Sit to Stand: Minimum assistance (05/03/18 1341)  Bed to Chair: Minimum assistance (05/03/18 1338)     Ambulation:  Gait  Base of Support: Narrowed (05/04/18 0800)  Speed/Jenny: Pace decreased (<100 feet/min) (05/04/18 0800)  Step Length: Right shortened (05/03/18 1341)  Stance: Left decreased (05/03/18 1341)  Gait Abnormalities: Decreased step clearance (05/04/18 0800)  Ambulation - Level of Assistance:  (close CGA) (05/04/18 0800)  Distance (ft): 100 Feet (ft) (05/04/18 0800)  Assistive Device: William Cox rollator (05/04/18 0800)  Interventions: Safety awareness training;Verbal cues (05/04/18 0800)  Duration: 15 Minutes (05/04/18 0800)    Impression/Plan:     Principal Problem:    Subdural hematoma, post-traumatic ([facility] [address] (5/2/2018)    Active Problems:    Combined hyperlipidemia (10/5/2015)      Overview: Last Assessment & Plan:       Formatting of this note may be different from the original.      Hyperlipidemia:      Patient was encouraged to follow low fat, low cholesterol diet in order to       reduce risk of cardiovascular events or complications, such as MI, CVA,       and PVD. Most recent lipid panel results were reviewed with patient at       today's visit.         Last Lipids (Total/LDL/HDL/TRG):       Lab Results       Component Value Date        CHOL 200* 04/08/2016        CHOL 209* 09/08/2015        CHOL 194 02/10/2015             Lab Results       Component Value Date        HDL 75 04/08/2016        HDL 87 09/08/2015        HDL 83 02/10/2015             Lab Results       Component Value Date        LDLCALC 109* 04/08/2016        LDLCALC 107* 09/08/2015        LDLCALC 98 02/10/2015             Lab Results       Component Value Date        TRIG 81 04/08/2016        TRIG 76 09/08/2015        TRIG 63 02/10/2015             The goal is to achieve total cholesterol <200 mg/dL, Triglycerides <150       mg/dL, HDL >40 mg/dL, LDL <100 mg/dL and chol/HDL ratio < 3.5. Last Assessment & Plan:       Formatting of this note may be different from the original.      Hyperlipidemia:      Patient was encouraged to follow low fat, low cholesterol diet in order to       reduce risk of cardiovascular events or complications, such as MI, CVA,       and PVD. Most recent lipid panel results were reviewed with patient at       today's visit. Last Lipids (Total/LDL/HDL/TRG):       Lab Results       Component Value Date        CHOL 200* 04/08/2016        CHOL 209* 09/08/2015        CHOL 194 02/10/2015             Lab Results       Component Value Date        HDL 75 04/08/2016        HDL 87 09/08/2015        HDL 83 02/10/2015             Lab Results       Component Value Date        LDLCALC 109* 04/08/2016        LDLCALC 107* 09/08/2015        LDLCALC 98 02/10/2015             Lab Results       Component Value Date        TRIG 81 04/08/2016        TRIG 76 09/08/2015        TRIG 63 02/10/2015             The goal is to achieve total cholesterol <200 mg/dL, Triglycerides <150       mg/dL, HDL >40 mg/dL, LDL <100 mg/dL and chol/HDL ratio < 3.5. Gastroesophageal reflux disease without esophagitis (10/5/2015)      Overview: Last Assessment & Plan:       Stable on present medications, continue as prescribed      Essential hypertension (10/5/2015)      Overview: Last Assessment & Plan:       Encouraged low sodium diet; Goal: take meds as prescribed, monitor blood       pressure at home and call with readings      Acquired atrophy of thyroid (10/5/2015)      Overview: Last Assessment & Plan: Will check TFT today; Goal: take medications as prescribed, take on an       empty stomach, wait at least 30 minutes before eating or taking other       medications      Anxiety disorder (11/25/2015)      Overview: Last Assessment & Plan:       1. Continue Klonopin 2 mg PO daily, Buspar 30 mg PO TID. 2. Effexor XR dose titration as above.          Current Facility-Administered Medications   Medication Dose Route Frequency Provider Last Rate Last Dose    magnesium hydroxide (MILK OF MAGNESIA) 400 mg/5 mL oral suspension 30 mL  30 mL Oral DAILY PRN Janice Downing MD        busPIRone (BUSPAR) tablet 30 mg  30 mg Oral TID Janice Downing MD   30 mg at 05/04/18 0859    buPROPion SR (WELLBUTRIN SR) tablet 150 mg  150 mg Oral DAILY Janice Downing MD   150 mg at 05/04/18 0859    oxyCODONE IR (ROXICODONE) tablet 5 mg  5 mg Oral Q6H PRN Janice Downing MD   5 mg at 05/04/18 0908    cloNIDine HCl (CATAPRES) tablet 0.1 mg  0.1 mg Oral Q4H PRN Janice Downing MD   0.1 mg at 05/03/18 1753    abaloparatide pnij 80 mcg (Patient Supplied)  80 mcg SubCUTAneous Kelvin Parra MD   Stopped at 05/03/18 0900    amLODIPine (NORVASC) tablet 5 mg  5 mg Oral DAILY Maxi Arias MD   5 mg at 05/04/18 0900    clonazePAM (KlonoPIN) tablet 2 mg  2 mg Oral QHS Maxi Arias MD   2 mg at 05/03/18 2123    cholecalciferol (VITAMIN D3) tablet 2,000 Units  2,000 Units Oral DAILY Maxi Arias MD   2,000 Units at 05/04/18 0859    calcium carbonate (TUMS) chewable tablet 200 mg [elemental]  200 mg Oral TID WITH MEALS Maxi Arias MD   200 mg at 05/04/18 0859    cyclobenzaprine (FLEXERIL) tablet 10 mg  10 mg Oral Q12H PRN Maxi Arias MD        pravastatin (PRAVACHOL) tablet 40 mg  40 mg Oral QHS Maxi Arias MD   40 mg at 05/03/18 2123    pantoprazole (PROTONIX) tablet 40 mg  40 mg Oral DAILY Maxi Arias MD   40 mg at 05/04/18 0900    miSOPROStol (CYTOTEC) tablet 200 mcg  200 mcg Oral BID WITH MEALS Maxi Arias MD   200 mcg at 05/04/18 0900    levothyroxine (SYNTHROID) tablet 137 mcg  137 mcg Oral ACB Maxi Arias MD   137 mcg at 05/04/18 0645    DULoxetine (CYMBALTA) capsule 60 mg  60 mg Oral BID Maxi Arias MD   60 mg at 05/04/18 0900    albuterol (PROVENTIL HFA, VENTOLIN HFA, PROAIR HFA) inhaler 2 Puff  2 Puff Inhalation Q4H PRN Monica Tapia MD        sodium chloride (NS) flush 5-10 mL  5-10 mL IntraVENous Q8H Monica Tapia MD   5 mL at 05/04/18 0646    sodium chloride (NS) flush 5-10 mL  5-10 mL IntraVENous PRN Monica Tapia MD        acetaminophen (TYLENOL) tablet 650 mg  650 mg Oral Q4H PRN Monica Tapia MD        ondansetron [facility]) injection 4 mg  4 mg IntraVENous Q4H PRN Monica Tapia MD        bisacodyl (DULCOLAX) tablet 5 mg  5 mg Oral DAILY PRN Monica Tapia MD        [facility]) injection 0.4 mg  0.4 mg IntraVENous PRN Monica Tapia MD        hydrALAZINE (APRESOLINE) 20 mg/mL injection 10 mg  10 mg IntraVENous Q6H PRN Monica Tapia MD   10 mg at 05/03/18 1307        Recommendations: Patient requesting continued rehab at [facility]. Continue Acute Rehab Program.  Monitoring and management of rehab conditions per the plan of care/orders. Will follow along with you for PM&R issues and provide you follow up. Will follow with SW/ /Admissions Coordinators regarding insurance approvals and acceptance. Rehabilitation Management: 1. Devices: Adaptive equipments, Walkers, Type: Rolling Walker  2. Consult:Rehab team including PT, OT, SLT and    3. Disposition Rehab-discussed with patient/ family  4. Plexipulse when in bed  5. Thigh-high or knee-high MÓNICA's when out of bed  6. DVT Prophylaxis per primary     Medical Management: Per primary  1. Vital signs per routine  2. Incentive spirometer Q1H while awake  3. Potential for neurogenic bladder. -Check post-void residual every shift; In and Out catheterize if post-void residual is morethan 400ml  4. Potential for neurogenic bowel - adequate bowel program recommmended. keep stool soft. Bella colace bid, suppositories prn.   5. Activity: as tolerated; fall precautions.         Thank you for the opportunity to participate in the care of this patient.     Signed By: Bonny Leonard MD     May 4, 2018

## 2018-05-03 NOTE — PROGRESS NOTES
Patient alert and oriented. Follow commands. Speech is clear.  are equal. No drift to extremities. Sinus rhythm on cardiac monitor. Heart rate 74. Respiration even and unlabored. Oxygen saturation 95 % on room air. Allevyn peeled back. Sacral/coccyx pink and blanchable. Large bruising to left hip and extremities. Skin tear to left elbow and left hand,wrapped with Kerlix. Instructed patient to call for any needs. Verbalize an understanding. Bed in low/lock position. Bed alarm on.

## 2018-05-03 NOTE — PROGRESS NOTES
Problem: Nutrition Deficit  Goal: *Optimize nutritional status  Nutrition  Reason for assessment: Referral received from nursing admission Malnutrition Screening Tool for recently lost 14-23# without trying and eating poorly due to decreased appetite. Assessment:   Diet order(s): Cardiac  Food/Nutrition Patient History:  Pt presented past a fall at home and admitted with a subdural hematoma; had L hip surgery in November and has been using a Rollator at home. Past medical history notable for HTN and hyperlipidemia. Pt lives with spouse; spouse has Parkinson's Disease. Pt endorses ~ 15# weight loss since Nov. 2017-daughter states weight loss may be r/t loss of muscle mass; no food preparation at home; pt receives food prepared by daughter or has take out food;  had been preparing her breakfast but not able to do so recently. Anthropometrics:Height: 5' 8\" (172.7 cm),  Weight: 74.8 kg (164 lb 12.8 oz), Weight source not specified, Body mass index is 25.06 kg/(m^2). BMI class of normal range for Older American Female. Macronutrient needs:  EER:  8521-4450 kcal /day (23-28 kcal/kg BW)  EPR:  64-76 grams protein/day (1-1.2 grams/kg IBW) GFR 58  Intake/Comparative Standards:  No recorded intake; pt verbalizes ~ 90% lunch meal this afternoon. Based on limited data, pt potentially meets % estimated kcal and protein needs,    Nutrition Diagnosis: Inadequate oral intake r/t decreased ability to consume sufficient oral intake as evidenced by estimates of insufficient intake of energy or high quality protein from diet when compared to requirements. Intervention:  Meals and snacks: Continue current diet. Assisted with supper menu selection. Briefly discussed the reasons for the Cardiac Diet prescription. Nutrition Supplement Therapy:  Initiated Ensure Enlive 2 X day   Ordered Weight  Discharge nutrition plan: Too soon to determine.     Amber Rodrigues, [address], [facility] [address], MPH  [phone]

## 2018-05-03 NOTE — PROGRESS NOTES
Earlier pt concerned about dosage for Wellbutrin. States she takes three pills at home of 150mg each. MD notified. Orders changed. Pt supplied med list. Wellbutrin 150mg daily. MD notified. Order changed to daily. Pt states she takes Buspar at home. MD notified. Re-ordered.

## 2018-05-04 LAB
MM INDURATION POC: 0 MM (ref 0–5)
PPD POC: NORMAL NEGATIVE

## 2018-05-04 PROCEDURE — 74011250637 HC RX REV CODE- 250/637: Performed by: INTERNAL MEDICINE

## 2018-05-04 PROCEDURE — 74011250637 HC RX REV CODE- 250/637: Performed by: HOSPITALIST

## 2018-05-04 PROCEDURE — 77030032490 HC SLV COMPR SCD KNE COVD -B

## 2018-05-04 PROCEDURE — 97116 GAIT TRAINING THERAPY: CPT

## 2018-05-04 PROCEDURE — 97110 THERAPEUTIC EXERCISES: CPT

## 2018-05-04 PROCEDURE — 65270000029 HC RM PRIVATE

## 2018-05-04 RX ORDER — ADHESIVE BANDAGE
30 BANDAGE TOPICAL DAILY PRN
Status: DISCONTINUED | OUTPATIENT
Start: 2018-05-04 | End: 2018-05-05 | Stop reason: HOSPADM

## 2018-05-04 RX ORDER — OXYCODONE HYDROCHLORIDE 5 MG/1
10 TABLET ORAL
Status: DISCONTINUED | OUTPATIENT
Start: 2018-05-04 | End: 2018-05-05 | Stop reason: HOSPADM

## 2018-05-04 RX ADMIN — VITAMIN D, TAB 1000IU (100/BT) 2000 UNITS: 25 TAB at 08:59

## 2018-05-04 RX ADMIN — CLONAZEPAM 2 MG: 0.5 TABLET ORAL at 21:42

## 2018-05-04 RX ADMIN — BUSPIRONE HYDROCHLORIDE 30 MG: 5 TABLET ORAL at 08:59

## 2018-05-04 RX ADMIN — BUSPIRONE HYDROCHLORIDE 30 MG: 5 TABLET ORAL at 21:42

## 2018-05-04 RX ADMIN — Medication 5 ML: at 21:46

## 2018-05-04 RX ADMIN — CALCIUM CARBONATE 200 MG: 500 TABLET, CHEWABLE ORAL at 17:43

## 2018-05-04 RX ADMIN — BUSPIRONE HYDROCHLORIDE 30 MG: 5 TABLET ORAL at 17:43

## 2018-05-04 RX ADMIN — CALCIUM CARBONATE 200 MG: 500 TABLET, CHEWABLE ORAL at 13:13

## 2018-05-04 RX ADMIN — MISOPROSTOL 200 MCG: 200 TABLET ORAL at 09:00

## 2018-05-04 RX ADMIN — LEVOTHYROXINE SODIUM 137 MCG: 50 TABLET ORAL at 06:45

## 2018-05-04 RX ADMIN — PANTOPRAZOLE SODIUM 40 MG: 40 TABLET, DELAYED RELEASE ORAL at 09:00

## 2018-05-04 RX ADMIN — DULOXETINE HYDROCHLORIDE 60 MG: 60 CAPSULE, DELAYED RELEASE ORAL at 17:42

## 2018-05-04 RX ADMIN — BUPROPION HYDROCHLORIDE 150 MG: 150 TABLET, EXTENDED RELEASE ORAL at 08:59

## 2018-05-04 RX ADMIN — MISOPROSTOL 200 MCG: 200 TABLET ORAL at 17:42

## 2018-05-04 RX ADMIN — OXYCODONE HYDROCHLORIDE 10 MG: 5 TABLET ORAL at 21:42

## 2018-05-04 RX ADMIN — OXYCODONE HYDROCHLORIDE 10 MG: 5 TABLET ORAL at 13:21

## 2018-05-04 RX ADMIN — MAGNESIUM HYDROXIDE 30 ML: 400 SUSPENSION ORAL at 10:29

## 2018-05-04 RX ADMIN — DULOXETINE HYDROCHLORIDE 60 MG: 60 CAPSULE, DELAYED RELEASE ORAL at 09:00

## 2018-05-04 RX ADMIN — Medication 5 ML: at 06:46

## 2018-05-04 RX ADMIN — PRAVASTATIN SODIUM 40 MG: 20 TABLET ORAL at 21:42

## 2018-05-04 RX ADMIN — AMLODIPINE BESYLATE 5 MG: 5 TABLET ORAL at 09:00

## 2018-05-04 RX ADMIN — OXYCODONE HYDROCHLORIDE 5 MG: 5 TABLET ORAL at 09:08

## 2018-05-04 RX ADMIN — CALCIUM CARBONATE 200 MG: 500 TABLET, CHEWABLE ORAL at 08:59

## 2018-05-04 NOTE — PROGRESS NOTES
Hospitalist Progress Note    2018  Admit Date: 2018  5:53 PM   NAME: Amairani Sarabia   :  1940   MRN:  [id number]   Attending: Lewis Guerrero MD  PCP:  Elida Johnson MD    SUBJECTIVE:     Amairani Sarabia is a 72yoF who was admitted on  after a fall at home on Thursday. Began having some confusion, and was found to have 5mm SDH on CT head. Images reviewed by NSGY and bleed was non-surgical. Admitted to ICU for further monitoring. Repeat CT stable, so transferred to medical floor overnight. Progressing slowly with PT.    : complains of HA but no vision changes. Does have chronic pain at multiple joints and pain from recent fall. Reports that she takes 10mg of oxy TID prn at home. Review of Systems negative with exception of pertinent positives noted above      PHYSICAL EXAM       Visit Vitals    /76 (BP 1 Location: Right arm)    Pulse 76    Temp 97.6 °F (36.4 °C)    Resp 18    Ht 5' 8\" (1.727 m)    Wt 74.8 kg (164 lb 12.8 oz)    SpO2 95%    BMI 25.06 kg/m2      Temp (24hrs), Av.2 °F (36.8 °C), Min:97.4 °F (36.3 °C), Max:99.7 °F (37.6 °C)    Oxygen Therapy  O2 Sat (%): 95 % (18 0336)  Pulse via Oximetry: 75 beats per minute (18 2100)  O2 Device: Room air (18 0904)    Intake/Output Summary (Last 24 hours) at 18 1100  Last data filed at 18 2128   Gross per 24 hour   Intake              480 ml   Output             1550 ml   Net            -1070 ml          General: No acute distress. Head:  Atraumatic Normocephalic. Lungs:  CTA Bilaterally. CVS:  RRR  Abdomen: Soft, NTTP  MSK:  Spontaneously moves extremities. Neurologic:  AOx3.  No focal deficits; dysarthria resolved  Psychiatry:      No anxiety/Depression  Skin:   Abrasions on L arm      Recent Results (from the past 24 hour(s))   PLEASE READ & DOCUMENT PPD TEST IN 24 HRS    Collection Time: 05/04/18 12:18 AM   Result Value Ref Range    PPD  Negative    mm Induration 0 mm Imaging /Procedures /Studies   CT HEAD WO CONT   Final Result   IMPRESSION:  Small stable subacute or chronic left subdural fluid collection. Chronic changes. CT HEAD WO CONT   Final Result   IMPRESSION: Small, 5 mm thick left subdural fluid collection, subacute or   chronic subdural hematoma without midline shift. Left lateral ventricle is   partially effaced. No acute hemorrhage. The findings were called to Dr. Orville Phillips by myself at time of reporting. 789            XR ELBOW LT MIN 3 V   Final Result   IMPRESSION: Negative for acute fracture. XR CHEST PORT   Final Result   IMPRESSION:  Negative for acute change. ASSESSMENT      Hospital Problems as of 5/4/2018  Date Reviewed: 4/27/2018          Codes Class Noted - Resolved POA    * (Principal)Subdural hematoma, post-traumatic ([facility] [address] ICD-10-CM: Q69.0Z0O  ICD-9-CM: 852.20  5/2/2018 - Present Yes        Anxiety disorder ICD-10-CM: F41.9  ICD-9-CM: 300.00  11/25/2015 - Present Yes    Overview Signed 2/10/2017  2:29 PM by Hattie Duarte MD     Last Assessment & Plan:   1. Continue Klonopin 2 mg PO daily, Buspar 30 mg PO TID. 2. Effexor XR dose titration as above. Combined hyperlipidemia ICD-10-CM: E78.2  ICD-9-CM: 272.2  10/5/2015 - Present Yes    Overview Addendum 2/9/2018  2:10 PM by Hattie Duarte MD     Last Assessment & Plan:   Formatting of this note may be different from the original.  Hyperlipidemia:  Patient was encouraged to follow low fat, low cholesterol diet in order to reduce risk of cardiovascular events or complications, such as MI, CVA, and PVD. Most recent lipid panel results were reviewed with patient at today's visit.     Last Lipids (Total/LDL/HDL/TRG):   Lab Results   Component Value Date    CHOL 200* 04/08/2016    CHOL 209* 09/08/2015    CHOL 194 02/10/2015     Lab Results   Component Value Date    HDL 75 04/08/2016    HDL 87 09/08/2015    HDL 83 02/10/2015     Lab Results Component Value Date    LDLCALC 109* 04/08/2016    LDLCALC 107* 09/08/2015    LDLCALC 98 02/10/2015     Lab Results   Component Value Date    TRIG 81 04/08/2016    TRIG 76 09/08/2015    TRIG 63 02/10/2015     The goal is to achieve total cholesterol <200 mg/dL, Triglycerides <150 mg/dL, HDL >40 mg/dL, LDL <100 mg/dL and chol/HDL ratio < 3.5. Last Assessment & Plan:   Formatting of this note may be different from the original.  Hyperlipidemia:  Patient was encouraged to follow low fat, low cholesterol diet in order to reduce risk of cardiovascular events or complications, such as MI, CVA, and PVD. Most recent lipid panel results were reviewed with patient at today's visit. Last Lipids (Total/LDL/HDL/TRG):   Lab Results   Component Value Date    CHOL 200* 04/08/2016    CHOL 209* 09/08/2015    CHOL 194 02/10/2015     Lab Results   Component Value Date    HDL 75 04/08/2016    HDL 87 09/08/2015    HDL 83 02/10/2015     Lab Results   Component Value Date    LDLCALC 109* 04/08/2016    LDLCALC 107* 09/08/2015    LDLCALC 98 02/10/2015     Lab Results   Component Value Date    TRIG 81 04/08/2016    TRIG 76 09/08/2015    TRIG 63 02/10/2015     The goal is to achieve total cholesterol <200 mg/dL, Triglycerides <150 mg/dL, HDL >40 mg/dL, LDL <100 mg/dL and chol/HDL ratio < 3.5.              Gastroesophageal reflux disease without esophagitis ICD-10-CM: K21.9  ICD-9-CM: 530.81  10/5/2015 - Present Yes    Overview Signed 2/10/2017  2:29 PM by Jj Hart MD     Last Assessment & Plan:   Stable on present medications, continue as prescribed             Essential hypertension ICD-10-CM: I10  ICD-9-CM: 401.9  10/5/2015 - Present Yes    Overview Signed 2/10/2017  2:29 PM by Jj Hart MD     Last Assessment & Plan:   Encouraged low sodium diet; Goal: take meds as prescribed, monitor blood pressure at home and call with readings             Acquired atrophy of thyroid ICD-10-CM: E03.4  ICD-9-CM: 246.8  10/5/2015 - Present Yes    Overview Signed 2/10/2017  2:29 PM by Kenroy Cyr MD     Last Assessment & Plan:    Will check TFT today; Goal: take medications as prescribed, take on an empty stomach, wait at least 30 minutes before eating or taking other medications                       Plan:  - acute to subacute SDH:  Repeat CT stable   Non-surgical per neurosurgery evaluation of images  Strict BP monitoring, goal < 140/90  Tx out of ICU at 24hrs    - HA:  Likely related to SDH  Patient reports that she does not tolerate tylenol, so will start home oxy    - Polypharmacy:   Concerning given fall at home and confusion  On scheduled opiates and benzos, which are high risk in the elderly population  Will need to be further addressed by her PCP    - HTN:  continue norvasc  hydral prn to keep BP as low as needed    - Hypothyroidism:  Continue synthroid    - Depression/anxiety:  Continue home wellbutrin, buspar, cymbalta and klonopin     DVT Prophylaxis: SCDs  Dispo: PT/OT following; PPD placed; plan for STR vs IRC in next 1-2 days    Jennifer Helm MD

## 2018-05-04 NOTE — PROGRESS NOTES
Pt meets criteria for 9th floor, but wants to go to the [address] [place] [address] and bed and pt can go on 5/5 Saturday if medical stable.

## 2018-05-04 NOTE — PROGRESS NOTES
Pt in bed resting no noted distress. Pt has had uneventful shift. Pt has rested throughout shift with family at side. Pt currently up in bed eating supper. Pt denies needs. Bed L/L, call bell is within reach and side rails are up x 2. Bed alarm on for safety.

## 2018-05-04 NOTE — PROGRESS NOTES
Pt in bed resting, no noted distress. Bedside report received from [address] [place] [address].

## 2018-05-04 NOTE — PROGRESS NOTES
Problem: Mobility Impaired (Adult and Pediatric)  Goal: *Acute Goals and Plan of Care (Insert Text)  STG:  (1.)Ms. Hammond will move from supine to sit and sit to supine  with CONTACT GUARD ASSIST within 4 treatment day(s). (2.)Ms. Hammond will transfer from bed to chair and chair to bed with CONTACT GUARD ASSIST using the least restrictive device within 4 treatment day(s). (3.)Ms. Hammond will ambulate with CONTACT GUARD ASSIST for 150 feet with the least restrictive device within 4 treatment day(s). (4.)pt. Will increase B LE strength 1/2 grade within 4 days  (5.)Pt. Will increase endurance and tolerate sitting up in chair for 3 hours within 4 days        ________________________________________________________________________________________________    PHYSICAL THERAPY: Daily Note, Treatment Day: 1st, AM 5/4/2018  INPATIENT: Hospital Day: 3  [place] / [place] / Product Type: Medicare /      NAME/AGE/GENDER: Reshma Diaz is a 76 y.o. female   PRIMARY DIAGNOSIS: Subdural hematoma, post-traumatic (HCC) Subdural hematoma, post-traumatic (HCC) Subdural hematoma, post-traumatic (HCC)        ICD-10: Treatment Diagnosis:    · Generalized Muscle Weakness (M62.81)  · Other abnormalities of gait and mobility (R26.89)  · History of falling (Z91.81)   Precaution/Allergies:  Codeine; Iodine and iodide containing products; Penicillins; Adhesive; Ammonium lactate; Amoxicillin; Glutamine-c-quercet-selen-brom; Honey; Procaine; and Streptomycin      ASSESSMENT:     Ms. Pardeep Heredia presents with L subdural hematoma after a fall and demonstrates decreased general strength and endurance, functional mobility, standing balance and safety awareness. She would benefit from further PT while here as it appears that she was able to ambulate and get in/out of bed on her own at baseline. She states that recently she has spent more time in the bed and her spouse has been having to offer more assist with ADLs.  I am recommending rehab at [facility] or SNF to help her regain her strength and mobility. 5/4  Willing to participate this am and get up in chair for breakfast. Less pain in L hip/buttock, but L arm still sensitive. Requires a little more help to get sit to stand from low surfaces such as toilet. She did need some cues for locking /unlocking the rollator. Some mild confusion. She did increase her ambulation distance and required less assistance. This section established at most recent assessment   PROBLEM LIST (Impairments causing functional limitations):  1. Decreased Strength  2. Decreased ADL/Functional Activities  3. Decreased Transfer Abilities  4. Decreased Ambulation Ability/Technique  5. Decreased Balance  6. Increased Pain  7. Decreased Activity Tolerance  8. Increased Fatigue  9. Decreased Flexibility/Joint Mobility  10. Decreased Knowledge of Precautions  11. Decreased [place] with Home Exercise Program   INTERVENTIONS PLANNED: (Benefits and precautions of physical therapy have been discussed with the patient.)  1. Balance Exercise  2. Bed Mobility  3. Family Education  4. Gait Training  5. Range of Motion (ROM)  6. Therapeutic Activites  7. Therapeutic Exercise/Strengthening  8. Transfer Training  9. endurance activities     TREATMENT PLAN: Frequency/Duration: daily for duration of hospital stay  Rehabilitation Potential For Stated Goals: Good     RECOMMENDED REHABILITATION/EQUIPMENT: (at time of discharge pending progress): Due to the probability of continued deficits (see above) this patient will likely need continued skilled physical therapy after discharge. Equipment:    has a rollator and shower chair at home              HISTORY:   History of Present Injury/Illness (Reason for Referral):  admitted after sustaining a fall and has a L subdural hematoma.  She has bruising on her L hip and skin tears on her L arm  Past Medical History/Comorbidities:   Ms. Jamshid Navarro  has a past medical history of Anxiety; Arthritis; Cancer ([facility] [address] Depression; History of bone density study (08/2016); Hypercholesterolemia; Hypertension; Onychomycosis; Osteoporosis; and Thyroid disease. Ms. Mc Neal  has a past surgical history that includes hx colectomy (2003); hx orthopaedic; pr breast surgery procedure unlisted; hx heent; hx cholecystectomy; hx gyn; and hx colonoscopy (2012). Social History/Living Environment:   Home Environment: Private residence  # Steps to Enter: 0  Wheelchair Ramp: Yes  One/Two Story Residence: Two story, live on 1st floor (basement)  # of Interior Steps: 20  Interior Rails: Left  Living Alone: No  Support Systems: Child(angelica), Spouse/Significant Other/Partner  Patient Expects to be Discharged to[de-identified] Rehabilitation facility  Current DME Used/Available at Home: [employer], [address] chair  Tub or Shower Type: Tub/Shower combination  Prior Level of Function/Work/Activity:  Able to amb with a rollator, but needed assist with ADLs and had been spending more time in bed recently  Dominant Side:         RIGHT   Number of Personal Factors/Comorbidities that affect the Plan of Care: 1-2: MODERATE COMPLEXITY   EXAMINATION:   Most Recent Physical Functioning:   Gross Assessment:  AROM: Generally decreased, functional  Strength: Generally decreased, functional (R LE 3+/5, L hip 3-; knee/ankle 3+/5)  Sensation: Intact               Posture:  Posture Assessment: Forward head, Rounded shoulders  Balance:  Sitting: Intact  Standing: Pull to stand; With support   Assisted her to the restroom. Required minimal assistance for low toilet transfers. Was able to cleanse her crissy area after I gave her the toilet paper.  Bed Mobility:  Supine to Sit: Minimum assistance  Wheelchair Mobility:     Transfers:     Gait:     Base of Support: Narrowed  Speed/Jenny: Pace decreased (<100 feet/min)  Gait Abnormalities: Decreased step clearance  Distance (ft): 100 Feet (ft)  Assistive Device: Walker, rollator  Ambulation - Level of Assistance: (close CGA)  Interventions: Safety awareness training;Verbal cues  Duration: 15 Minutes      Body Structures Involved:  1. Bones  2. Joints  3. Muscles Body Functions Affected:  1. Neuromusculoskeletal  2. Movement Related Activities and Participation Affected:  1. Mobility  2. Self Care   Number of elements that affect the Plan of Care: 4+: HIGH COMPLEXITY   CLINICAL PRESENTATION:   Presentation: Evolving clinical presentation with changing clinical characteristics: MODERATE COMPLEXITY   CLINICAL DECISION MAKIN [facility] Inpatient Short Form  How much difficulty does the patient currently have. .. Unable A Lot A Little None   1. Turning over in bed (including adjusting bedclothes, sheets and blankets)? [] 1   [] 2   [x] 3   [] 4   2. Sitting down on and standing up from a chair with arms ( e.g., wheelchair, bedside commode, etc.)   [] 1   [] 2   [x] 3   [] 4   3. Moving from lying on back to sitting on the side of the bed? [] 1   [] 2   [x] 3   [] 4   How much help from another person does the patient currently need. .. Total A Lot A Little None   4. Moving to and from a bed to a chair (including a wheelchair)? [] 1   [] 2   [x] 3   [] 4   5. Need to walk in hospital room? [] 1   [] 2   [x] 3   [] 4   6. Climbing 3-5 steps with a railing? [] 1   [x] 2   [] 3   [] 4   © , Trustees of [address] [place] [address], under license to [employer]. All rights reserved      Score:  Initial: 17 Most Recent: X (Date: -- )    Interpretation of Tool:  Represents activities that are increasingly more difficult (i.e. Bed mobility, Transfers, Gait). Score 24 23 22-20 19-15 14-10 9-7 6     Modifier CH CI CJ CK CL CM CN      ?  Mobility - Walking and Moving Around:     - CURRENT STATUS: CK - 40%-59% impaired, limited or restricted    - GOAL STATUS: CJ - 20%-39% impaired, limited or restricted    - D/C STATUS:  ---------------To be determined---------------  [place] / Product Type: Medicare /      Medical Necessity:     · Patient demonstrates good rehab potential due to higher previous functional level. Reason for Services/Other Comments:  · Patient continues to require present interventions due to patient's inability to perform functional mobility independently. Use of outcome tool(s) and clinical judgement create a POC that gives a: Clear prediction of patient's progress: LOW COMPLEXITY            TREATMENT:   (In addition to Assessment/Re-Assessment sessions the following treatments were rendered)   Pre-treatment Symptoms/Complaints: Sleepy, \"not a morning person\"  Pain: Initial:   Pain Intensity 1: 0  Pain Location 1: Arm, Hip, Buttocks  Pain Orientation 1: Left  Pain Intervention(s) 1: Ambulation/Increased Activity, Elevation, Repositioned  Post Session:  0     Gait Training (15 Minutes):  Gait training to improve and/or restore physical functioning as related to mobility, strength and balance. Ambulated 100 Feet (ft) with  (close CGA) using a Walker, rollator and minimal Safety awareness training;Verbal cues related to their posture and safety awareness for locking /unlocking  brakes on rollator to promote proper body posture and safety. Therapeutic Exercise: (15 Minutes):  Exercises per grid below to improve mobility, strength and coordination. Required minimal visual, verbal and tactile cues to promote proper body alignment. Progressed resistance, range, repetitions and complexity of movement as indicated.      Date:  5/4/18 Date:   Date:     Activity/Exercise Parameters Parameters Parameters   SUPINE: ankle DF/PF 10                   Hip AB/AD 10                   Heel slide 10                   SLR 10     SITTING: LAQ 10                   Sit to stand from low seat 2            She does require some cueing to get started with ex., but once she gets going, she's fine.      Braces/Orthotics/Lines/Etc:   · O2 Device: Room air  Treatment/Session Assessment:    · Response to Treatment:  Did well this morning. Still a little confused with decreased safety, but increased her amb distance with less assistance required  · Interdisciplinary Collaboration:   o Physical Therapist  o Registered Nurse  o Certified Nursing Assistant/Patient Care Technician  · After treatment position/precautions:   o Up in chair  o Bed alarm/tab alert on  o Bed/Chair-wheels locked  o Call light within reach  o RN notified  o CNA aware she is up   · Compliance with Program/Exercises: Will assess as treatment progresses. · Recommendations/Intent for next treatment session: \"Next visit will focus on advancements to more challenging activities and reduction in assistance provided\".  Will focus on safety awareness, strengthening, and functional mobility in our next session  Total Treatment Duration:  PT Patient Time In/Time Out  Time In: 0730  Time Out: 0800    oRbby Michelle PT

## 2018-05-04 NOTE — PROGRESS NOTES
Problem: Interdisciplinary Rounds  Goal: Interdisciplinary Rounds  Interdisciplinary team rounds were held 5/4/2018 with the following team members:Care Management, Nursing, Nutrition, Pharmacy, Physical Therapy and Physician and the patient. Plan of care discussed. See clinical pathway and/or care plan for interventions and desired outcomes.

## 2018-05-04 NOTE — PROGRESS NOTES
Primary Nurse Stacey Lantigua RN performed a dual skin assessment on this patient Impairment noted- see wound doc flow sheet  Jayesh score is 19.

## 2018-05-04 NOTE — PROGRESS NOTES
Saw pt in interdisciplinarily rounds, plan of care and discharge date/ location discussed. Per the hospitalitis plan to d/c tomorrow to [facility] SURGICAL [facility] as previously discussed.

## 2018-05-04 NOTE — PROGRESS NOTES
Dressing to left elbow and left hand changed at this time. Old dressing removed. Dressing replaced with xeroform guaze, 4x4s and covered with tegaderm. Pt tolerated well.

## 2018-05-04 NOTE — PROGRESS NOTES
Admission assessment completed via doc flow sheet. Patient is alert and oriented x 4. Respirations even and unlabored on room air. Lung sounds CTA bilaterally. Heart sounds S1, S2 auscultated and regular. Abdomen soft and non tender. Bowel sounds active to all 4 quadrants. IV flushed without difficulty. Patient reported generalized pain 5/10. Bed is locked and in low position. Bed alarm on and rails up x 3. Patient is encouraged to call for assistance. Call light within reach.

## 2018-05-04 NOTE — PROGRESS NOTES
TRANSFER - OUT REPORT:    Verbal report given to [facility] on Olga Velazco  being transferred to [facility] for routine progression of care       Report consisted of patients Situation, Background, Assessment and   Recommendations(SBAR). Information from the following report(s) SBAR, ED Summary, Intake/Output and MAR was reviewed with the receiving nurse. Lines:   Peripheral IV 05/02/18 Right Antecubital (Active)   Site Assessment Intact 5/3/2018  7:00 PM   Phlebitis Assessment 0 5/3/2018  7:00 PM   Infiltration Assessment 0 5/3/2018  7:00 PM   Dressing Status Old drainage 5/3/2018  7:00 PM   Dressing Type Tape;Transparent 5/3/2018  7:00 PM   Hub Color/Line Status Pink;Capped 5/3/2018  7:00 PM   Alcohol Cap Used No 5/3/2018 11:39 AM        Opportunity for questions and clarification was provided.       Patient transported with:   [email or web address]

## 2018-05-04 NOTE — PROGRESS NOTES
TRANSFER - IN REPORT:    Verbal report received from [id number] (name) on Kaylene Burnett  being received from ICU (unit) for routine progression of care      Report consisted of patients Situation, Background, Assessment and   Recommendations(SBAR). Information from the following report(s) Kardex, Intake/Output, MAR, Accordion and Recent Results was reviewed with the receiving nurse. Opportunity for questions and clarification was provided. Assessment completed upon patients arrival to unit and care assumed.

## 2018-05-05 VITALS
WEIGHT: 164.8 LBS | RESPIRATION RATE: 16 BRPM | TEMPERATURE: 97.8 F | SYSTOLIC BLOOD PRESSURE: 140 MMHG | OXYGEN SATURATION: 96 % | HEIGHT: 68 IN | HEART RATE: 78 BPM | DIASTOLIC BLOOD PRESSURE: 87 MMHG | BODY MASS INDEX: 24.98 KG/M2

## 2018-05-05 LAB
MM INDURATION POC: 0 MM (ref 0–5)
PPD POC: NEGATIVE NEGATIVE

## 2018-05-05 PROCEDURE — 74011250637 HC RX REV CODE- 250/637: Performed by: HOSPITALIST

## 2018-05-05 PROCEDURE — 74011250637 HC RX REV CODE- 250/637: Performed by: INTERNAL MEDICINE

## 2018-05-05 RX ORDER — CLONAZEPAM 1 MG/1
1 TABLET ORAL
Qty: 2 TAB | Refills: 0 | Status: SHIPPED | OUTPATIENT
Start: 2018-05-05 | End: 2018-05-05

## 2018-05-05 RX ORDER — OXYCODONE HYDROCHLORIDE 5 MG/1
5 TABLET ORAL
Qty: 8 TAB | Refills: 0 | Status: SHIPPED | OUTPATIENT
Start: 2018-05-05 | End: 2018-08-13

## 2018-05-05 RX ORDER — BUPROPION HYDROCHLORIDE 150 MG/1
450 TABLET, EXTENDED RELEASE ORAL DAILY
Status: DISCONTINUED | OUTPATIENT
Start: 2018-05-06 | End: 2018-05-05 | Stop reason: HOSPADM

## 2018-05-05 RX ORDER — CYCLOBENZAPRINE HCL 10 MG
10 TABLET ORAL
Qty: 5 TAB | Refills: 0 | Status: SHIPPED | OUTPATIENT
Start: 2018-05-05 | End: 2018-07-09 | Stop reason: SDUPTHER

## 2018-05-05 RX ADMIN — PANTOPRAZOLE SODIUM 40 MG: 40 TABLET, DELAYED RELEASE ORAL at 09:22

## 2018-05-05 RX ADMIN — BUPROPION HYDROCHLORIDE 150 MG: 150 TABLET, EXTENDED RELEASE ORAL at 09:22

## 2018-05-05 RX ADMIN — DULOXETINE HYDROCHLORIDE 60 MG: 60 CAPSULE, DELAYED RELEASE ORAL at 09:22

## 2018-05-05 RX ADMIN — Medication 10 ML: at 05:48

## 2018-05-05 RX ADMIN — VITAMIN D, TAB 1000IU (100/BT) 2000 UNITS: 25 TAB at 09:23

## 2018-05-05 RX ADMIN — CALCIUM CARBONATE 200 MG: 500 TABLET, CHEWABLE ORAL at 09:22

## 2018-05-05 RX ADMIN — AMLODIPINE BESYLATE 5 MG: 5 TABLET ORAL at 09:23

## 2018-05-05 RX ADMIN — LEVOTHYROXINE SODIUM 137 MCG: 50 TABLET ORAL at 05:46

## 2018-05-05 RX ADMIN — MISOPROSTOL 200 MCG: 200 TABLET ORAL at 09:23

## 2018-05-05 NOTE — PROGRESS NOTES
Spoke with pt about her Wellbutrin. Pt states she takes 450 mg po once daily. Both her & her  state that this is the amount her psychiatrist orders for her. I spoke with dr. Lynn Villalta & he stated to reorder the correct amount that she normally takes & to give it to her.

## 2018-05-05 NOTE — PROGRESS NOTES
Shift assessment complete; pt in bed resting. Alert & oriented X4. Resp even & unlabored on room air; lungs clear. HR regular. Abdomen soft with active bowel sounds. Skin intact. No c/o pain, SOB or chest pain. Bed low & locked; call light in reach; no needs voiced; will continue to monitor.

## 2018-05-05 NOTE — PROGRESS NOTES
TRANSFER - OUT REPORT:    Verbal report given to Deloris Strong RN (name) on Floydene Liming  being transferred to [facility] (unit) for routine progression of care       Report consisted of patients Situation, Background, Assessment and   Recommendations(SBAR). Information from the following report(s) SBAR, Kardex, Intake/Output, MAR and Recent Results was reviewed with the receiving nurse. Lines:       Opportunity for questions and clarification was provided.

## 2018-05-05 NOTE — DISCHARGE SUMMARY
Hospitalist Discharge Summary     Admit Date:  2018  5:53 PM   Name:  Hailey Crandall   Age:  76 y.o.  :  1940   MRN:  [id number]   PCP:  Didier Lal MD  Treatment Team: Attending Provider: Keyshawn Juarez MD    Problem List for this Hospitalization:  Hospital Problems as of 2018  Date Reviewed: 2018          Codes Class Noted - Resolved POA    * (Principal)Subdural hematoma, post-traumatic ([facility] [address] ICD-10-CM: W80.9W8J  ICD-9-CM: 852.20  2018 - Present Yes        Anxiety disorder ICD-10-CM: F41.9  ICD-9-CM: 300.00  2015 - Present Yes    Overview Signed 2/10/2017  2:29 PM by Didier Lal MD     Last Assessment & Plan:   1. Continue Klonopin 2 mg PO daily, Buspar 30 mg PO TID. 2. Effexor XR dose titration as above. Combined hyperlipidemia ICD-10-CM: E78.2  ICD-9-CM: 272.2  10/5/2015 - Present Yes    Overview Addendum 2018  2:10 PM by Didier Lal MD     Last Assessment & Plan:   Formatting of this note may be different from the original.  Hyperlipidemia:  Patient was encouraged to follow low fat, low cholesterol diet in order to reduce risk of cardiovascular events or complications, such as MI, CVA, and PVD. Most recent lipid panel results were reviewed with patient at today's visit. Last Lipids (Total/LDL/HDL/TRG):   Lab Results   Component Value Date    CHOL 200* 2016    CHOL 209* 2015    CHOL 194 02/10/2015     Lab Results   Component Value Date    HDL 75 2016    HDL 87 2015    HDL 83 02/10/2015     Lab Results   Component Value Date    LDLCALC 109* 2016    LDLCALC 107* 2015    LDLCALC 98 02/10/2015     Lab Results   Component Value Date    TRIG 81 2016    TRIG 76 2015    TRIG 63 02/10/2015     The goal is to achieve total cholesterol <200 mg/dL, Triglycerides <150 mg/dL, HDL >40 mg/dL, LDL <100 mg/dL and chol/HDL ratio < 3.5.   Last Assessment & Plan:   Formatting of this note may be different from the original.  Hyperlipidemia:  Patient was encouraged to follow low fat, low cholesterol diet in order to reduce risk of cardiovascular events or complications, such as MI, CVA, and PVD. Most recent lipid panel results were reviewed with patient at today's visit. Last Lipids (Total/LDL/HDL/TRG):   Lab Results   Component Value Date    CHOL 200* 04/08/2016    CHOL 209* 09/08/2015    CHOL 194 02/10/2015     Lab Results   Component Value Date    HDL 75 04/08/2016    HDL 87 09/08/2015    HDL 83 02/10/2015     Lab Results   Component Value Date    LDLCALC 109* 04/08/2016    LDLCALC 107* 09/08/2015    LDLCALC 98 02/10/2015     Lab Results   Component Value Date    TRIG 81 04/08/2016    TRIG 76 09/08/2015    TRIG 63 02/10/2015     The goal is to achieve total cholesterol <200 mg/dL, Triglycerides <150 mg/dL, HDL >40 mg/dL, LDL <100 mg/dL and chol/HDL ratio < 3.5. Gastroesophageal reflux disease without esophagitis ICD-10-CM: K21.9  ICD-9-CM: 530.81  10/5/2015 - Present Yes    Overview Signed 2/10/2017  2:29 PM by Dominick Kan MD     Last Assessment & Plan:   Stable on present medications, continue as prescribed             Essential hypertension ICD-10-CM: I10  ICD-9-CM: 401.9  10/5/2015 - Present Yes    Overview Signed 2/10/2017  2:29 PM by Dominick Kan MD     Last Assessment & Plan:   Encouraged low sodium diet; Goal: take meds as prescribed, monitor blood pressure at home and call with readings             Acquired atrophy of thyroid ICD-10-CM: E03.4  ICD-9-CM: 246.8  10/5/2015 - Present Yes    Overview Signed 2/10/2017  2:29 PM by Dominick Kan MD     Last Assessment & Plan:    Will check TFT today; Goal: take medications as prescribed, take on an empty stomach, wait at least 30 minutes before eating or taking other medications                     Admission HPI from 5/2/2018:    \" 75 y/o female who is still recuperating from a left hip surgery in November states that last Thursday she was using her rolling walker when overloaded the front of it whick led to her being unbalanced then tumbled upon a carpeted floor. She sustained abrasions left wrist and elbow and bruising left hip and leg. Also hit the back of her head on a plastic TV. She did not have LOC. Her  heard her fall and came to her aid. They called their PCP but it was too late in the day to be seen. Did end up having xrays on the 28th and had negative left hip, elbow, shoulder and knee. Yesterday though she began having trouble finding words and had some confusion. No other focal neurologic deficits.  was concerned so brought her to ED today. Head CT shows a small 5 mm thick left subdural fluid collection without midline shift. Neurosurgery called who felt non-surgical at present but recommended admission with repeat CT scan in am. Patient is not on any anticoagulants or aspirin. \"    Hospital Course: Pao Kemp is a 72yoF who was admitted on 5/2 after a fall at home on Thursday. Began having some confusion, and was found to have 5mm SDH on CT head. Images reviewed by NSGY and bleed was non-surgical. Admitted to ICU for further monitoring. Repeat CT stable, so transferred to medical floor overnight. Progressing slowly with PT. Had discussion with pt about trying to minimize controlled substance which can contribute to mortality in the elderly. Pt is resistant, saying she has tried everything else. Will defer this matter to her PCP and pain .  She is willing at least to try switching from klonopin to flexeril for sleep; says she thinks that may work better and has tried in past.  Explained to her flexeril also has side effects which can contribute to fall risk. Pt understands brain bleeds can be life threatening. Again, defer this matter to PCP and pain specialist.  Pt expressed understanding and agreement. Follow up instructions and discharge meds at bottom of this note.   Plan was discussed with pt. All questions answered. Patient was stable at time of discharge. Diagnostic Imaging/Tests:   Xr Elbow Lt Min 3 V    Result Date: 5/2/2018  LEFT ELBOW 3 views. HISTORY:  Elbow pain following injury. TECHNIQUE: AP and lateral and oblique views. COMPARISON: None. FINDINGS: The radial head appears intact. No abnormal fat pad signs. No fracture or dislocation. IMPRESSION: Negative for acute fracture. Ct Head Wo Cont    Result Date: 5/3/2018  CT HEAD WITHOUT CONTRAST. INDICATION: Subdural hemorrhage, follow-up. COMPARISON: Yesterday's exam.  TECHNIQUE:   5 mm axial scans from the skull base to the vertex. Our CT scanners use one or more of the following:  Automated exposure control, adjustment of the mA and or kV according to patient size, iterative reconstruction. FINDINGS:  No acute intraparenchymal hemorrhage. The small left subdural fluid collection is stable. No acute hemorrhage within this. No midline shift. Mild chronic small vessel disease changes. The ventricles are not enlarged. Included portion of the paranasal sinuses and orbits grossly unremarkable. IMPRESSION:  Small stable subacute or chronic left subdural fluid collection. Chronic changes. Ct Head Wo Cont    Result Date: 5/2/2018  CT HEAD WITHOUT CONTRAST. INDICATION: Abnormal speech. COMPARISON: None. TECHNIQUE:   5 mm axial scans from the skull base to the vertex. Our CT scanners use one or more of the following:  Automated exposure control, adjustment of the mA and or kV according to patient size, iterative reconstruction. FINDINGS:  No acute intraparenchymal hemorrhage. There is a small subdural fluid collection on the left which is low-attenuation and measures 5 mm thickness. No midline shift. No skull fracture. Left lateral ventricle is partially effaced. Mild white matter low attenuation is present, nonspecific, likely chronic small vessel disease.  Included portion of the paranasal sinuses and orbits grossly unremarkable. IMPRESSION: Small, 5 mm thick left subdural fluid collection, subacute or chronic subdural hematoma without midline shift. Left lateral ventricle is partially effaced. No acute hemorrhage. The findings were called to Dr. Irma Manuel by myself at time of reporting. Yrn Delatorre    Result Date: 5/2/2018  CHEST X-RAY, one view. HISTORY:  Slurred speech and confusion. TECHNIQUE:  AP upright portable view. COMPARISON: None. FINDINGS:   The lungs are clear. The heart size is normal. The costophrenic angles are sharp. The pulmonary vasculature is unremarkable. Included portion of the upper abdomen is unremarkable. There are bilateral shoulder arthroplasties and bilateral breast implants. IMPRESSION:  Negative for acute change. Echocardiogram results:  No results found for this visit on 05/02/18.       All Micro Results     None          Labs: Results:       BMP, Mg, Phos Recent Labs      05/03/18   0413  05/02/18   1801   NA  142  137   K  3.5  3.5   CL  107  101   CO2  25  26   AGAP  10  10   BUN  21  30*   CREA  0.98  1.18*   CA  8.1*  9.0   GLU  87  141*   MG  2.2   --    PHOS  3.3   --       CBC Recent Labs      05/02/18   1801   WBC  8.5   RBC  3.93*   HGB  11.8   HCT  35.8   PLT  339   GRANS  68   LYMPH  19   EOS  4   MONOS  9   BASOS  0   IG  0   ANEU  5.8   ABL  1.6   RL  0.3   ABM  0.7   ABB  0.0   AIG  0.0      LFT Recent Labs      05/02/18   1801   SGOT  58*   ALT  53   AP  96   TP  7.2   ALB  3.3   GLOB  3.9*   AGRAT  0.8*      Cardiac Testing Lab Results   Component Value Date/Time    CK 52 05/02/2018 06:01 PM      Coagulation Tests Lab Results   Component Value Date/Time    Prothrombin time 12.3 05/02/2018 06:01 PM    INR 0.9 05/02/2018 06:01 PM      A1c No results found for: HBA1C, HGBE8, [id number]   Lipid Panel Lab Results   Component Value Date/Time    Cholesterol, total 222 (H) 02/02/2018 10:55 AM    HDL Cholesterol 97 02/02/2018 10:55 AM    LDL, calculated 100 (H) 02/02/2018 10:55 AM    VLDL, calculated 25 02/02/2018 10:55 AM    Triglyceride 124 02/02/2018 10:55 AM      Thyroid Panel Lab Results   Component Value Date/Time    TSH 1.430 05/02/2018 10:05 PM    TSH 3.630 02/02/2018 10:55 AM    T4, Free 1.03 06/01/2017 02:27 PM        Most Recent UA Lab Results   Component Value Date/Time    Color Yellow 02/02/2018 10:55 AM    Appearance Clear 02/02/2018 10:55 AM    pH (UA) 6.5 02/02/2018 10:55 AM    Ketone Negative 02/02/2018 10:55 AM    Bilirubin Negative 02/02/2018 10:55 AM    Blood Negative 02/02/2018 10:55 AM    Nitrites Negative 02/02/2018 10:55 AM    Leukocyte Esterase Negative 02/02/2018 10:55 AM        Allergies   Allergen Reactions    Codeine Anaphylaxis    Iodine And Iodide Containing Products Anaphylaxis    Penicillins Anaphylaxis    Adhesive Unknown (comments)    Ammonium Lactate Other (comments)     Topical burn    Amoxicillin Other (comments)     Swelling internal and external    Glutamine-C-Quercet-Selen-Brom Unknown (comments)     Whole wheat flour    Honey Hives    Procaine Other (comments)     Severe mouth sores    Streptomycin Swelling and Hives     Immunization History   Administered Date(s) Administered    Influenza High Dose Vaccine PF 09/10/2013, 11/24/2014, 10/05/2015, 10/19/2016, 02/11/2018    Influenza Vaccine 11/20/2012    Pneumococcal Conjugate (PCV-13) 10/19/2016    Pneumococcal Polysaccharide (PPSV-23) 12/27/2012    TB Skin Test (PPD) Intradermal 05/03/2018    Tdap 10/06/2011    Zoster Vaccine, Live 07/23/2012       All Labs from Last 24 Hrs:  Recent Results (from the past 24 hour(s))   PLEASE READ & DOCUMENT PPD TEST IN 48 HRS    Collection Time: 05/05/18 12:00 AM   Result Value Ref Range    PPD Negative Negative    mm Induration 0 mm       Discharge Exam:  Patient Vitals for the past 24 hrs:   Temp Pulse Resp BP SpO2   05/05/18 0737 97.8 °F (36.6 °C) 78 16 140/87 96 %   05/05/18 0457 97.7 °F (36.5 °C) 77 16 128/75 97 %   05/04/18 2350 96.3 °F (35.7 °C) 72 16 134/74 96 %   05/04/18 1833 98.3 °F (36.8 °C) 66 18 148/66 97 %   05/04/18 1509 97.8 °F (36.6 °C) 76 18 146/71 94 %   05/04/18 1156 97.7 °F (36.5 °C) 74 18 122/69 96 %     Oxygen Therapy  O2 Sat (%): 96 % (05/05/18 0737)  Pulse via Oximetry: 75 beats per minute (05/03/18 2100)  O2 Device: Room air (05/04/18 2350)    Intake/Output Summary (Last 24 hours) at 05/05/18 0912  Last data filed at 05/05/18 0737   Gross per 24 hour   Intake                0 ml   Output             1300 ml   Net            -1300 ml       General:    Well nourished. Alert. No distress. Eyes:   Normal sclera. Extraocular movements intact. ENT:  Normocephalic, atraumatic. Moist mucous membranes  CV:   Regular rate and rhythm. No murmur, rub, or gallop. Lungs:  Clear to auscultation bilaterally. No wheezing, rhonchi, or rales. Abdomen: Soft, nontender, nondistended. Bowel sounds normal.   Extremities: Warm and dry. No cyanosis or edema. Neurologic: CN II-XII grossly intact. Sensation intact. Skin:     No rashes or jaundice. Psych:  Normal mood and affect. Discharge Info:   Current Discharge Medication List      START taking these medications    Details   naloxone (EVZIO) 2 mg/0.4 mL auto-injector 0.4 mL by IntraMUSCular route once as needed for Overdose for up to 1 dose. Qty: 1 Device, Refills: 0         CONTINUE these medications which have CHANGED    Details   cyclobenzaprine (FLEXERIL) 10 mg tablet Take 1 Tab by mouth nightly as needed for Muscle Spasm(s). Indications: sleep  Qty: 5 Tab, Refills: 0    Associated Diagnoses: Primary insomnia      oxyCODONE IR (ROXICODONE) 5 mg immediate release tablet Take 1 Tab by mouth every six (6) hours as needed for Pain.  Max Daily Amount: 20 mg.  Qty: 8 Tab, Refills: 0    Associated Diagnoses: Contusion of left hip, initial encounter         CONTINUE these medications which have NOT CHANGED    Details   busPIRone (BUSPAR) 30 mg tablet Take 30 mg by mouth three (3) times daily. hydrocortisone (CORTAID) 0.5 % topical cream Apply 2.5 Each to affected area two (2) times a day. use thin layer   Indications: apply to face skin      triamcinolone acetonide (KENALOG) 0.025 % topical cream Apply 2.5 Each to affected area two (2) times a day. use thin layer   Indications: Allergic Dermatitis, apply to other body parts      glucosamine/chondr maradiaga A sod (GLUCOSAMINE-CHONDROITIN) 750-600 mg tab Take  by mouth two (2) times a day. ascorbic acid, vitamin C, (VITAMIN C) 500 mg tablet Take 500 mg by mouth daily. calcium carbonate (TUMS) 200 mg calcium (500 mg) chew take 1 po TID      buPROPion XL (WELLBUTRIN XL) 150 mg tablet Take 1 Tab by mouth every morning for 90 days. Qty: 90 Tab, Refills: 1    Associated Diagnoses: Moderate episode of recurrent major depressive disorder (HCC)      levothyroxine (SYNTHROID) 137 mcg tablet Take 137 mcg by mouth Daily (before breakfast) for 90 days. Qty: 90 Tab, Refills: 3    Associated Diagnoses: Acquired atrophy of thyroid      pantoprazole (PROTONIX) 40 mg tablet Take 1 Tab by mouth daily for 90 days. Qty: 90 Tab, Refills: 3    Associated Diagnoses: Gastroesophageal reflux disease without esophagitis      cyanocobalamin (VITAMIN B12) 1,000 mcg/mL injection Inject 1 mL as directed every 14 (fourteen) days. abaloparatide 80 mcg (3,120 mcg/1.56 mL) pnij Inject 0.04 mL (80 mcg) under the skin daily. diclofenac EC (VOLTAREN) 50 mg EC tablet Take 1 Tab by mouth two (2) times a day for 90 days. Qty: 180 Tab, Refills: 1    Associated Diagnoses: Generalized osteoarthritis      miSOPROStol (CYTOTEC) 200 mcg tablet Take 1 Tab by mouth two (2) times daily (with meals) for 90 days. Qty: 180 Tab, Refills: 3    Associated Diagnoses: Gastroesophageal reflux disease without esophagitis      pravastatin (PRAVACHOL) 40 mg tablet Take 1 Tab by mouth nightly for 90 days.   Qty: 90 Tab, Refills: 3    Associated Diagnoses: Multiple-type hyperlipidemia      amLODIPine (NORVASC) 5 mg tablet Take 1 Tab by mouth daily for 90 days. Qty: 90 Tab, Refills: 3    Associated Diagnoses: Essential hypertension      DISABLED PLACARD (DISABLED PLACARD) DMV Supply prescription to [facility] with completed form [id number]. Syringe with Needle, Safety 3 mL 23 gauge x 1\" syrg by Does Not Apply route. cholecalciferol, vitamin D3, 2,000 unit tab Take 2,000 Units by mouth daily. calcium-vitamin D 600 mg(1,500mg) -200 unit tab Take by mouth 2 (two) times a day. HYSINGLA ER 40 mg ER tablet Take 1 Tab by mouth daily. DULoxetine (CYMBALTA) 60 mg capsule Take 1 Cap by mouth two (2) times a day. Qty: 180 Cap, Refills: 3    Associated Diagnoses: Moderate episode of recurrent major depressive disorder (HCC)      PROAIR HFA 90 mcg/actuation inhaler       BIOTIN PO Take 5,000 mcg by mouth daily. STOP taking these medications       trimethoprim-sulfamethoxazole (BACTRIM DS, SEPTRA DS) 160-800 mg per tablet Comments:   Reason for Stopping:         cefdinir (OMNICEF) 300 mg capsule Comments:   Reason for Stopping:         clonazePAM (KLONOPIN) 2 mg tablet Comments:   Reason for Stopping:                 Disposition: SNF    Activity: Activity as tolerated, PT/OT evals  Diet: DIET CARDIAC Regular  DIET NUTRITIONAL SUPPLEMENTS Breakfast, Dinner; Ensure Enlive    No orders of the defined types were placed in this encounter. Follow-up Information     Follow up With Details Comments Nando Stevenson [facility] Go today rehab WAGNER/Luis Alanis MD Go to when discharged from rehab, for controlled substance evaluation [facility] [place] [address]  [phone]            Time spent in patient discharge planning and coordination 35 minutes.     Signed:  Nevin Allison MD

## 2018-05-05 NOTE — PROGRESS NOTES
Shift assessment complete. Pt alert and oriented x4, respirations present, even and unlabored, HOB elevated, pt denies any SOB at this time, S1&S2 auscultated, HR regular, abd soft, non- tender, Active BS in all 4 quadrants, No pressure ulcers or edema noted, skin tear to left elbow with dressing in place, pt is up with assistance to the bathroom, voiding, pt denies any pain at this time, pt instructed to call for assistance, pt verbalizes understanding, bed low and locked, side rails x3, call light within reach.

## 2018-05-07 ENCOUNTER — PATIENT OUTREACH (OUTPATIENT)
Dept: CASE MANAGEMENT | Age: 78
End: 2018-05-07

## 2018-05-07 NOTE — PROGRESS NOTES
This note will not be viewable in [address] Patient discharged to [facility] on 5/5/18. Patient discharged to a [facility] facility. Patient will be included in weekly care coordination calls. Information forwarded to Hui Whitt, [facility] Provider [facility] RN Care Manager.

## 2018-05-07 NOTE — Clinical Note
Patient discharged to [facility] on 5/5/18. Dx Left Subdural Hematoma.  Thanks

## 2018-07-09 PROBLEM — E55.9 VITAMIN D INSUFFICIENCY: Status: ACTIVE | Noted: 2018-07-09

## 2018-07-09 PROBLEM — S72.002A CLOSED FRACTURE OF NECK OF LEFT FEMUR (HCC): Status: RESOLVED | Noted: 2017-11-15 | Resolved: 2018-07-09

## 2018-07-09 PROBLEM — G89.4 CHRONIC PAIN DISORDER: Status: ACTIVE | Noted: 2018-05-10

## 2018-08-17 ENCOUNTER — HOSPITAL ENCOUNTER (EMERGENCY)
Age: 78
Discharge: HOME | End: 2018-08-17
Attending: EMERGENCY MEDICINE
Payer: MEDICARE

## 2018-08-17 ENCOUNTER — APPOINTMENT (OUTPATIENT)
Dept: CT IMAGING | Age: 78
End: 2018-08-17
Attending: EMERGENCY MEDICINE
Payer: MEDICARE

## 2018-08-17 ENCOUNTER — HOSPITAL ENCOUNTER (OUTPATIENT)
Dept: GENERAL RADIOLOGY | Age: 78
Discharge: HOME | End: 2018-08-17
Attending: INTERNAL MEDICINE
Payer: MEDICARE

## 2018-08-17 VITALS
RESPIRATION RATE: 16 BRPM | BODY MASS INDEX: 22.73 KG/M2 | OXYGEN SATURATION: 98 % | SYSTOLIC BLOOD PRESSURE: 172 MMHG | HEIGHT: 68 IN | DIASTOLIC BLOOD PRESSURE: 84 MMHG | HEART RATE: 92 BPM | WEIGHT: 150 LBS | TEMPERATURE: 98.1 F

## 2018-08-17 DIAGNOSIS — S32.10XA CLOSED FRACTURE OF SACRUM, UNSPECIFIED PORTION OF SACRUM, INITIAL ENCOUNTER (HCC): Primary | ICD-10-CM

## 2018-08-17 LAB
ALBUMIN SERPL-MCNC: 3.4 G/DL (ref 3.2–4.6)
ALBUMIN/GLOB SERPL: 0.9 {RATIO} (ref 1.2–3.5)
ALP SERPL-CCNC: 149 U/L (ref 50–136)
ALT SERPL-CCNC: 32 U/L (ref 12–65)
ANION GAP SERPL CALC-SCNC: 14 MMOL/L (ref 7–16)
AST SERPL-CCNC: 31 U/L (ref 15–37)
BASOPHILS # BLD: 0.1 K/UL
BASOPHILS NFR BLD: 1 % (ref 0–2)
BILIRUB SERPL-MCNC: 0.6 MG/DL (ref 0.2–1.1)
BUN SERPL-MCNC: 23 MG/DL (ref 8–23)
CALCIUM SERPL-MCNC: 9.7 MG/DL (ref 8.3–10.4)
CHLORIDE SERPL-SCNC: 100 MMOL/L (ref 98–107)
CO2 SERPL-SCNC: 25 MMOL/L (ref 21–32)
CREAT SERPL-MCNC: 1.32 MG/DL (ref 0.6–1)
DIFFERENTIAL METHOD BLD: ABNORMAL
EOSINOPHIL # BLD: 0.2 K/UL
EOSINOPHIL NFR BLD: 2 % (ref 0.5–7.8)
ERYTHROCYTE [DISTWIDTH] IN BLOOD BY AUTOMATED COUNT: 12.5 %
GLOBULIN SER CALC-MCNC: 3.9 G/DL (ref 2.3–3.5)
GLUCOSE SERPL-MCNC: 112 MG/DL (ref 65–100)
HCT VFR BLD AUTO: 39.6 % (ref 35.8–46.3)
HGB BLD-MCNC: 13.1 G/DL (ref 11.7–15.4)
IMM GRANULOCYTES # BLD: 0 K/UL
IMM GRANULOCYTES NFR BLD AUTO: 0 % (ref 0–5)
LYMPHOCYTES # BLD: 1.8 K/UL
LYMPHOCYTES NFR BLD: 19 % (ref 13–44)
MCH RBC QN AUTO: 30 PG (ref 26.1–32.9)
MCHC RBC AUTO-ENTMCNC: 33.1 G/DL (ref 31.4–35)
MCV RBC AUTO: 90.8 FL (ref 79.6–97.8)
MONOCYTES # BLD: 0.8 K/UL
MONOCYTES NFR BLD: 8 % (ref 4–12)
NEUTS SEG # BLD: 6.5 K/UL
NEUTS SEG NFR BLD: 70 % (ref 43–78)
NRBC # BLD: 0 K/UL (ref 0–0.2)
PLATELET # BLD AUTO: 258 K/UL (ref 150–450)
PMV BLD AUTO: 9.1 FL (ref 9.4–12.3)
POTASSIUM SERPL-SCNC: 3.2 MMOL/L (ref 3.5–5.1)
PROT SERPL-MCNC: 7.3 G/DL (ref 6.3–8.2)
RBC # BLD AUTO: 4.36 M/UL (ref 4.05–5.2)
SODIUM SERPL-SCNC: 139 MMOL/L (ref 136–145)
WBC # BLD AUTO: 9.4 K/UL (ref 4.3–11.1)

## 2018-08-17 PROCEDURE — 74011250636 HC RX REV CODE- 250/636: Performed by: EMERGENCY MEDICINE

## 2018-08-17 PROCEDURE — 80053 COMPREHEN METABOLIC PANEL: CPT

## 2018-08-17 PROCEDURE — 99284 EMERGENCY DEPT VISIT MOD MDM: CPT | Performed by: EMERGENCY MEDICINE

## 2018-08-17 PROCEDURE — 70450 CT HEAD/BRAIN W/O DYE: CPT

## 2018-08-17 PROCEDURE — 81003 URINALYSIS AUTO W/O SCOPE: CPT | Performed by: EMERGENCY MEDICINE

## 2018-08-17 PROCEDURE — 85025 COMPLETE CBC W/AUTO DIFF WBC: CPT

## 2018-08-17 PROCEDURE — 96361 HYDRATE IV INFUSION ADD-ON: CPT | Performed by: EMERGENCY MEDICINE

## 2018-08-17 PROCEDURE — 72110 X-RAY EXAM L-2 SPINE 4/>VWS: CPT

## 2018-08-17 PROCEDURE — 96374 THER/PROPH/DIAG INJ IV PUSH: CPT | Performed by: EMERGENCY MEDICINE

## 2018-08-17 RX ORDER — ONDANSETRON 2 MG/ML
4 INJECTION INTRAMUSCULAR; INTRAVENOUS
Status: COMPLETED | OUTPATIENT
Start: 2018-08-17 | End: 2018-08-17

## 2018-08-17 RX ORDER — OXYCODONE HYDROCHLORIDE 5 MG/1
5 TABLET ORAL
Qty: 10 TAB | Refills: 0 | Status: SHIPPED | OUTPATIENT
Start: 2018-08-17 | End: 2018-11-19

## 2018-08-17 RX ADMIN — ONDANSETRON 4 MG: 2 INJECTION, SOLUTION INTRAMUSCULAR; INTRAVENOUS at 18:15

## 2018-08-17 RX ADMIN — SODIUM CHLORIDE 1000 ML: 900 INJECTION, SOLUTION INTRAVENOUS at 18:15

## 2018-08-17 NOTE — ED PROVIDER NOTES
HPI Comments: 68-year-old female sent to the emergency department for findings of a sacral fracture on x-ray. Patient reports 2 weeks ago she fell hitting her back. She has had some continued pain in her lower back as well as right sciatic region. No loss of bowel or bladder function. Ambulatory without significant issues. 2 days ago she had a bout of nausea vomiting and diarrhea which she states has improved. History of prior subdural noted in chart review. No confusion. She states she is in between pain management that was previously prescribed oxycodone. Now on ultram and makes her nauseous. Patient is a 76 y.o. female presenting with fall and vomiting. The history is provided by the patient. No  was used. Fall   Associated symptoms include nausea and vomiting. Pertinent negatives include no fever, no abdominal pain and no headaches. Vomiting    Pertinent negatives include no fever, no abdominal pain, no headaches, no cough and no headaches. Past Medical History:   Diagnosis Date    Anxiety     Arthritis     Cancer ([facility] [address]     Depression     History of bone density [facility] 08/2016    [facility]    Hypercholesterolemia     Hypertension     Onychomycosis     Osteoporosis     Thyroid disease        Past Surgical History:   Procedure Laterality Date    BREAST SURGERY PROCEDURE UNLISTED      HX CHOLECYSTECTOMY      HX COLECTOMY  2003    HX COLONOSCOPY  2012    HX GYN      HX HEENT      HX ORTHOPAEDIC           Family History:   Problem Relation Age of Onset    Heart Disease Mother     Heart Disease Father        Social History     Social History    Marital status:      Spouse name: N/A    Number of children: N/A    Years of education: N/A     Occupational History    Not on file.      Social History Main Topics    Smoking status: Former Smoker    Smokeless tobacco: Never Used    Alcohol use No    Drug use: No    Sexual activity: Not on file Other Topics Concern    Not on file     Social History Narrative         ALLERGIES: Codeine; Iodine and iodide containing products; Penicillins; Adhesive; Ammonium lactate; Amoxicillin; Glutamine-c-quercet-selen-brom; Honey; Procaine; and Streptomycin    Review of Systems   Constitutional: Negative for fatigue and fever. HENT: Negative for sore throat. Respiratory: Negative for cough, chest tightness and shortness of breath. Cardiovascular: Negative for leg swelling. Gastrointestinal: Positive for nausea and vomiting. Negative for abdominal pain. Genitourinary: Negative for dysuria. Musculoskeletal: Positive for back pain. Neurological: Negative for syncope and headaches. Psychiatric/Behavioral: Negative for confusion. Vitals:    08/17/18 1713   BP: 143/75   Pulse: 92   Resp: 16   Temp: 98.1 °F (36.7 °C)   SpO2: 98%   Weight: 68 kg (150 lb)   Height: 5' 8\" (1.727 m)            Physical Exam   Constitutional: She is oriented to person, place, and time. She appears well-developed and well-nourished. No distress. HENT:   Head: Normocephalic and atraumatic. Eyes: Conjunctivae and EOM are normal. Pupils are equal, round, and reactive to light. Neck: Normal range of motion. Neck supple. Cardiovascular: Normal rate, regular rhythm and normal heart sounds. Pulmonary/Chest: Effort normal and breath sounds normal. No respiratory distress. She has no wheezes. She has no rales. Abdominal: Soft. She exhibits no distension. There is no tenderness. There is no rebound. Musculoskeletal: Normal range of motion. She exhibits tenderness. She exhibits no edema. Minimal midline and bilateral lower back pain to palpation. No weakness. Denies any loss of bowel or bladder function. Neurological: She is alert and oriented to person, place, and time. Skin: Skin is warm and dry. No rash noted. She is not diaphoretic. Psychiatric: She has a normal mood and affect.  Her behavior is normal. Vitals reviewed. MDM  Number of Diagnoses or Management Options  Closed fracture of sacrum, unspecified portion of sacrum, initial encounter [facility]): new and does not require workup  Diagnosis management comments: Patient has an old sacral fracture that appears to be in the process of healing on x-ray. Her creatinine is slightly elevated from baseline was given a liter of fluid here in the ED. She has no signs or symptoms of any type of cauda equina process going on. She'll discharged home and have her follow up with PCP and orthopedics as needed. Return precautions discussed. Bryanna Neil MD; 8/18/2018 @1:05 AM Voice dictation software was used during the making of this note. This software is not perfect and grammatical and other typographical errors may be present.   This note has not been proofread for errors.  ===================================================================         Amount and/or Complexity of Data Reviewed  Clinical lab tests: reviewed and ordered (Results for orders placed or performed during the hospital encounter of 08/17/18  -CBC WITH AUTOMATED DIFF       Result                                            Value                         Ref Range                       WBC                                               9.4                           4.3 - 11.1 K/uL                 RBC                                               4.36                          4.05 - 5.2 M/uL                 HGB                                               13.1                          11.7 - 15.4 g/dL                HCT                                               39.6                          35.8 - 46.3 %                   MCV                                               90.8                          79.6 - 97.8 FL                  MCH                                               30.0                          26.1 - 32.9 PG                  MCHC 33.1                          31.4 - 35.0 g/dL                RDW                                               12.5                          %                               PLATELET                                          258                           150 - 450 K/uL                  MPV                                               9.1 (L)                       9.4 - 12.3 FL                   ABSOLUTE NRBC                                     0.00                          0.0 - 0.2 K/uL                  DF                                                AUTOMATED                                                     NEUTROPHILS                                       70                            43 - 78 %                       LYMPHOCYTES                                       19                            13 - 44 %                       MONOCYTES                                         8                             4.0 - 12.0 %                    EOSINOPHILS                                       2                             0.5 - 7.8 %                     BASOPHILS                                         1                             0.0 - 2.0 %                     IMMATURE GRANULOCYTES                             0                             0.0 - 5.0 %                     ABS. NEUTROPHILS                                  6.5                           K/UL                            ABS. LYMPHOCYTES                                  1.8                           K/UL                            ABS. MONOCYTES                                    0.8                           K/UL                            ABS. EOSINOPHILS                                  0.2                           K/UL                            ABS. BASOPHILS                                    0.1                           K/UL                            ABS. IMM.  GRANS.                                  0.0                           K/UL -METABOLIC PANEL, COMPREHENSIVE       Result                                            Value                         Ref Range                       Sodium                                            139                           136 - 145 mmol/L                Potassium                                         3.2 (L)                       3.5 - 5.1 mmol/L                Chloride                                          100                           98 - 107 mmol/L                 CO2                                               25                            21 - 32 mmol/L                  Anion gap                                         14                            7 - 16 mmol/L                   Glucose                                           112 (H)                       65 - 100 mg/dL                  BUN                                               23                            8 - 23 MG/DL                    Creatinine                                        1.32 (H)                      0.6 - 1.0 MG/DL                 GFR est AA                                        50 (L)                        >60 ml/min/1.73m2               GFR est non-AA                                    41 (L)                        >60 ml/min/1.73m2               Calcium                                           9.7                           8.3 - 10.4 MG/DL                Bilirubin, total                                  0.6                           0.2 - 1.1 MG/DL                 ALT (SGPT)                                        32                            12 - 65 U/L                     AST (SGOT)                                        31                            15 - 37 U/L                     Alk. phosphatase                                  149 (H)                       50 - 136 U/L                    Protein, total                                    7.3                           6.3 - 8.2 g/dL Albumin                                           3.4                           3.2 - 4.6 g/dL                  Globulin                                          3.9 (H)                       2.3 - 3.5 g/dL                  A-G Ratio                                         0.9 (L)                       1.2 - 3.5                  )  Tests in the radiology section of CPT®: ordered and reviewed (Xr Spine Lumb Min 4 V    Result Date: 8/17/2018  EXAMINATION: LUMBAR SPINE RADIOGRAPHS 8/17/2018 3:11 PM ACCESSION NUMBER: [id number] INDICATION: low back pain, recent fall COMPARISON: None available TECHNIQUE: 5 views of the lumbar spine were obtained. FINDINGS: For the purposes of this examination, there will be 5 lumbar type vertebral bodies. The last well formed intervertebral disc will be defined as L5-S1. There is partial sacralization of L5. There is a levoconvex lumbar spine curvature, with the apex of curvature at the L3 level. This includes right lateral listhesis of L1 on L2. The spinous processes are midline. The pedicles are preserved. There is no definite evidence of a pars defect on the oblique views within the limits of the spinal scoliotic curvature. There is sclerotic degenerative endplate remodeling at E1-S8 and L2-L3. There is chronic appearing height loss at T12. There is no definite evidence of acute compression fracture. L1-L2 and L2-L3 intervertebral disc space narrowing. There is retrolisthesis of L2 on L3. There is multilevel facet arthropathy. There is an apparent step off along the anterior sacral cortex on the lateral view of the sacrum. There is a partially visualized hip arthroplasty. IMPRESSION: There is an apparent step off along the anterior sacral cortex on the lateral view. Given the history of recent fall, this is favored to represent a sacral fracture. No evidence of an acute lumbar spine compression fracture. Lumbar spine degenerative findings as detailed above.  DC4 The significant findings in this report have been referred to the Imaging Navigator in order to communicate to the referring provider or his/her designee as outlined in Section II.C.2.a.ii-iii of the ACR Practice Guideline for Communication of Diagnostic Imaging Findings. VOICE DICTATED BY: Dr. Jenae Miranda    Result Date: 8/17/2018  CT HEAD WITHOUT CONTRAST, 8/17/2018 History: Fall from walker seat 2 weeks ago with no known injury. Now with nausea. Comparison: CT head without contrast 5/3/2018 Technique:   5 mm axial scans from the skull base to the vertex. All CT scans performed at this facility use one or all of the following: Automated exposure control, adjustment of the mA and/or kVp according to patient's size, iterative reconstruction. Findings:  No evidence of intracranial hemorrhage is seen. No abnormal extra-axial fluid collections are seen. Moderate cortical involutional changes are seen which are not clearly abnormal given the patient's age. No evidence for acute hydrocephalus is seen. No evidence of midline shift or herniation is seen. No abnormal edema pattern is seen in a vascular distribution to suggest large artery infarction. Only vague periventricular white matter hypoattenuation is seen favored to represent mild to moderate chronic microangiopathic changes. Evaluation with bone windows shows no acute osseous changes. The visualized sinuses, middle ears, and mastoid air cells are well aerated. IMPRESSION:  1.   No acute intracranial process evident by noncontrast CT study of the head.     )  Review and summarize past medical records: yes  Independent visualization of images, tracings, or specimens: yes    Risk of Complications, Morbidity, and/or Mortality  Presenting problems: low  Diagnostic procedures: low  Management options: low    Patient Progress  Patient progress: stable        ED Course       Procedures

## 2018-08-17 NOTE — DISCHARGE INSTRUCTIONS
Sacral Fracture: Care Instructions  Your Care Instructions    A sacral fracture occurs when a bone called the sacrum breaks. The sacrum is a large triangular bone at the bottom of the spine. It fits like a wedge between the two hipbones. The sacrum is made up of the sacral vertebrae, which are fused together. Sometimes the coccyx, or tailbone, is fractured along with the sacrum. The tailbone is the small bone at the bottom of the spine. It is attached to the sacral bone above it. In some cases, an injury to the sacrum can affect the nerves that control the bladder, bowel, or legs. Home treatment may be all that is needed for some sacral fractures. If a fracture is severe or affects nerves, you may need surgery. You heal best when you take good care of yourself. Eat a variety of healthy foods, and don't smoke. Follow-up care is a key part of your treatment and safety. Be sure to make and go to all appointments, and call your doctor if you are having problems. It's also a good idea to know your test results and keep a list of the medicines you take. How can you care for yourself at home? · Follow your doctor's instructions for bed rest and activity. · Take pain medicines exactly as directed. ¨ If the doctor gave you a prescription medicine for pain, take it as prescribed. ¨ If you are not taking a prescription pain medicine, ask your doctor if you can take an over-the-counter medicine. · Put ice or a cold pack on the painful area for 10 to 20 minutes at a time. Try to do this every 1 to 2 hours for the next 3 days (when you are awake). Put a thin cloth between the ice and your skin or brace. · Do not sit on hard, unpadded surfaces. Sit on a doughnut-shaped pillow to take pressure off the tailbone area. · Put only as much weight on each leg as your doctor tells you to. He or she may advise you to use crutches, a walker, or a cane to help you walk.   · Avoid constipation, because straining to have a bowel movement will increase your pain. ¨ Include fruits, vegetables, beans, and whole grains in your diet each day. These foods are high in fiber. ¨ Drink plenty of fluids, enough so that your urine is light yellow or clear like water. If you have kidney, heart, or liver disease and have to limit fluids, talk with your doctor before you increase your fluid intake. When should you call for help? Call 911 anytime you think you may need emergency care. For example, call if:    · You are unable to move a leg at all.   [facility] your doctor now or seek immediate medical care if:    · You have new or worse symptoms in your legs or buttocks. Symptoms may include:  ¨ Numbness or tingling. ¨ Weakness. ¨ Pain.     · You lose bladder or bowel control.    Watch closely for changes in your health, and be sure to contact your doctor if:    · You are not getting better as expected. Where can you learn more? Go to http://nury-joe.info/. Enter [phone] in the search box to learn more about \"Sacral Fracture: Care Instructions. \"  Current as of: November 29, 2017  Content Version: 11.7  © 0999-6689 [employer]. Care instructions adapted under license by [employer] (which disclaims liability or warranty for this information). If you have questions about a medical condition or this instruction, always ask your healthcare professional. [address] any warranty or liability for your use of this information.

## 2018-08-17 NOTE — ED TRIAGE NOTES
Patient had a fall out of seat on walker 2 weeks ago and has been having lower back pain. Patient with Xray today that advises sacral fracture. Patient also with complaints of nausea, vomiting and diarrhea.

## 2018-08-17 NOTE — ED NOTES
I have reviewed discharge instructions with the patient. The patient verbalized understanding. Patient left ED via Discharge Method: wheelchair to Home with spouse. Opportunity for questions and clarification provided. Patient given 1 scripts. Follow up with ortho reviewed. To continue your aftercare when you leave the hospital, you may receive an automated call from our care team to check in on how you are doing. This is a free service and part of our promise to provide the best care and service to meet your aftercare needs.  If you have questions, or wish to unsubscribe from this service please call [phone]. Thank you for Choosing our [employer] Emergency Department.

## 2018-11-19 PROBLEM — Z87.81 HISTORY OF FRACTURE OF LEFT HIP: Status: ACTIVE | Noted: 2018-11-19

## 2018-11-19 PROBLEM — S06.5XAA SUBDURAL HEMATOMA, POST-TRAUMATIC: Status: RESOLVED | Noted: 2018-05-02 | Resolved: 2018-11-19

## 2019-11-26 PROBLEM — R73.01 IFG (IMPAIRED FASTING GLUCOSE): Status: ACTIVE | Noted: 2019-11-26

## 2020-01-27 ENCOUNTER — APPOINTMENT (OUTPATIENT)
Dept: GENERAL RADIOLOGY | Age: 80
End: 2020-01-27
Attending: EMERGENCY MEDICINE
Payer: MEDICARE

## 2020-01-27 ENCOUNTER — HOSPITAL ENCOUNTER (EMERGENCY)
Age: 80
Discharge: HOME | End: 2020-01-27
Attending: EMERGENCY MEDICINE
Payer: MEDICARE

## 2020-01-27 VITALS
SYSTOLIC BLOOD PRESSURE: 164 MMHG | DIASTOLIC BLOOD PRESSURE: 76 MMHG | RESPIRATION RATE: 18 BRPM | HEIGHT: 68 IN | HEART RATE: 65 BPM | TEMPERATURE: 98 F | BODY MASS INDEX: 21.67 KG/M2 | WEIGHT: 143 LBS | OXYGEN SATURATION: 99 %

## 2020-01-27 DIAGNOSIS — M79.621: Primary | ICD-10-CM

## 2020-01-27 PROCEDURE — 73010 X-RAY EXAM OF SHOULDER BLADE: CPT

## 2020-01-27 PROCEDURE — 74011250637 HC RX REV CODE- 250/637: Performed by: EMERGENCY MEDICINE

## 2020-01-27 PROCEDURE — 99283 EMERGENCY DEPT VISIT LOW MDM: CPT

## 2020-01-27 PROCEDURE — 73030 X-RAY EXAM OF SHOULDER: CPT

## 2020-01-27 RX ORDER — IBUPROFEN 400 MG/1
400 TABLET ORAL
Status: COMPLETED | OUTPATIENT
Start: 2020-01-27 | End: 2020-01-27

## 2020-01-27 RX ADMIN — IBUPROFEN 400 MG: 400 TABLET, FILM COATED ORAL at 14:37

## 2020-01-27 NOTE — ED PROVIDER NOTES
[facility] Emergency Department  Arrival Date/Time: 1/27/2020 @ [facility]  MRN: [id number]      41 y.o. female    YOB: 1940   Mobile #:   No relevant phone numbers on file. [facility] EMERGENCY DEPT FT10/10  Seen on 1/27/2020 @ 1:16 PM       Chief Complaint   Patient presents with    Shoulder Pain     HPI: 55-year-old female history of osteoporosis history of orthopedic surgeries had a fall early this morning. Landed on her right scapula. Complains of pain in the shoulder and scapular area    She has chronic pain. She takes Norco for it. Last dose was at noon. Says it really has not helped much. HPI    Historian: patient    Review of Systems:  No fever  No chills  No chest pain    Allergies: Allergies   Allergen Reactions    Codeine Anaphylaxis    Iodine And Iodide Containing Products Anaphylaxis    Penicillins Anaphylaxis and Swelling    Adhesive Unknown (comments)    Ammonium Lactate Other (comments)     Topical burn    Amoxicillin Other (comments)     Swelling internal and external    Glutamine-C-Quercet-Selen-Brom Unknown (comments)     Whole wheat flour    Honey Hives    Procaine Other (comments)     Severe mouth sores    Streptomycin Swelling and Hives         Key Anti-Platelet Anticoagulant Meds     The patient is on no antiplatelet meds or anticoagulants. Physical Exam:  Nursing documentation reviewed. Vitals:    01/27/20 1253 01/27/20 1345 01/27/20 1440   BP: 175/85  164/76   Pulse: 73  65   Resp: 16  18   Temp: 97.6 °F (36.4 °C)  98 °F (36.7 °C)   SpO2: 98% 98% 99%    Vital signs were reviewed. Physical Exam  Constitutional:       General: She is not in acute distress. Appearance: Normal appearance. She is not ill-appearing. Musculoskeletal:      Right shoulder: She exhibits decreased range of motion, tenderness and bony tenderness. Arms:    Skin:     General: Skin is warm.    Neurological:      Mental Status: She is alert. 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 There are chronic findings as described. Comparison to prior studies would be helpful. 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 A prior shoulder x-[facility] from [facility] 01/06/2020 is currently unavailable. The report from that study was reviewed. Direct comparison may be beneficial.       Medications given in the ED:   Medications   ibuprofen (MOTRIN) tablet 400 mg (400 mg Oral Given 1/27/20 3057)        Recheck:   Shoulder and scapular x-rays are negative for any fracture    Discussed with the patient. Put her in a sling have her follow-up with her primary care orthopedist.  Procedure Documentation: Procedures     Assessment and Plan:      Impression:     ICD-10-CM ICD-9-CM   1.  Pain in superior right upper extremity M79.609 729.5        Condition at disposition: stable    Disposition: Discharged     Follow-up:   Follow-up Information     Follow up With Specialties Details Why Contact Info    Lopez Seals MD Internal Medicine   [facility] [address] EMERGENCY DEPT Emergency Medicine   [address] [place] [address] [id number]           Discharge Medications:   Discharge Medication List as of 1/27/2020  2:27 PM           Bernie Morse MD; 1/27/2020 @1:16 PM          Past Medical History: Primary Care Doctor: Partha Diane MD     Past Medical History:   Diagnosis Date    Anxiety     Arthritis     Cancer [facility])     Depression     History of bone density study 08/2016    Dr. Babin Stains    Hypercholesterolemia     Hypertension     Onychomycosis     Osteoporosis     Thyroid disease      Past Surgical History:   Procedure Laterality Date    BREAST SURGERY PROCEDURE UNLISTED      HX CHOLECYSTECTOMY      HX COLECTOMY  2003    HX COLONOSCOPY  2012    HX GYN      HX HEENT      HX ORTHOPAEDIC       Social History     Tobacco Use    Smoking status: Former Smoker    Smokeless tobacco: Never Used   Substance Use Topics    Alcohol use: No    Drug use: No      Home Medication:   Cannot display prior to admission medications because the patient has not been admitted in this contact.

## 2020-01-27 NOTE — ED TRIAGE NOTES
Pt presents to ED s/p fall this morning at 0300. Pt states she lost balance while getting up. Denies LOC, hitting head, dizziness, CP, SOB prior to fall. Pt c/o pain to right shoulder blade. No obvious deformity of RUE.

## 2020-01-27 NOTE — ED NOTES
I have reviewed discharge instructions with the patient. The patient verbalized understanding. Patient left ED via Discharge Method: ambulatory to Home with family. Opportunity for questions and clarification provided. Patient given 0 scripts. To continue your aftercare when you leave the hospital, you may receive an automated call from our care team to check in on how you are doing. This is a free service and part of our promise to provide the best care and service to meet your aftercare needs.  If you have questions, or wish to unsubscribe from this service please call [phone]. Thank you for Choosing our [facility] Emergency Department.

## 2020-01-27 NOTE — DISCHARGE INSTRUCTIONS
I do not see fractures of the humerus or the scapula    Wear a sling for comfort    Ice her shoulder    Motrin. Tylenol. Aloe up with Justin Benitez. Call today for an appointment. Xr Scapula Rt    Result Date: 1/27/2020  Right scapular radiographs HISTORY: Pain after fall left side. 3 views of right scapula were obtained. Correlation is made to right shoulder radiographs performed on the same day. Prior right shoulder x-ray from [facility] dated 01/06/2020 is currently unavailable. The report from that study was reviewed. FINDINGS: A reverse total right shoulder arthroplasty is present. There is remodeling and sclerosis with associated bone loss of the glenoid. This includes absence of bone associated with the fixation screws of the glenoid component. There is lucency surrounding the cement of the visualized right humerus with associated cortical thinning. No definite acute fracture or dislocation is present. There is no bony destruction. There are mild degenerative changes of the acromioclavicular joints. IMPRESSION: Changes of reverse shoulder arthroplasty without acute bony abnormality. There are chronic findings as described. Comparison to prior studies would be helpful. Xr Shoulder Rt Ap/lat Min 2 V    Result Date: 1/27/2020  Right shoulder series HISTORY: Pain after fall. Comparison: None FINDINGS: 3 views were obtained. A right reverse total shoulder arthroplasty is present. There is remodeling of the scapula adjacent to the glenoid component with areas of sclerosis and bone loss. There is loss of bone associated with the fixation screws of the glenoid component There is lucency surrounding the cement of the humeral component with significant bone extending along the cortex. No definite acute fracture is identified. The acromioclavicular joint is intact. IMPRESSION:  Right shoulder arthroplasty with scapular and humeral remodeling and bone loss.  A prior shoulder x-ray from [facility] 01/06/2020 is currently unavailable. The report from that study was reviewed.  Direct comparison may be beneficial.

## 2020-10-09 PROBLEM — M97.32XA PERIPROSTHETIC FRACTURE AROUND INTERNAL PROSTHETIC LEFT SHOULDER JOINT: Status: ACTIVE | Noted: 2020-10-09

## 2021-01-14 ENCOUNTER — HOME HEALTH ADMISSION (OUTPATIENT)
Dept: HOME HEALTH SERVICES | Facility: HOME HEALTH | Age: 81
End: 2021-01-14
Payer: MEDICARE

## 2021-01-15 ENCOUNTER — HOME CARE VISIT (OUTPATIENT)
Dept: SCHEDULING | Facility: HOME HEALTH | Age: 81
End: 2021-01-15
Payer: MEDICARE

## 2021-01-15 VITALS
HEART RATE: 84 BPM | OXYGEN SATURATION: 97 % | SYSTOLIC BLOOD PRESSURE: 150 MMHG | RESPIRATION RATE: 18 BRPM | DIASTOLIC BLOOD PRESSURE: 76 MMHG | TEMPERATURE: 98.2 F

## 2021-01-15 PROCEDURE — 400018 HH-NO PAY CLAIM PROCEDURE

## 2021-01-15 PROCEDURE — G0299 HHS/HOSPICE OF RN EA 15 MIN: HCPCS

## 2021-01-15 PROCEDURE — 400013 HH SOC

## 2021-01-18 ENCOUNTER — HOME CARE VISIT (OUTPATIENT)
Dept: SCHEDULING | Facility: HOME HEALTH | Age: 81
End: 2021-01-18
Payer: MEDICARE

## 2021-01-18 PROCEDURE — G0152 HHCP-SERV OF OT,EA 15 MIN: HCPCS

## 2021-01-19 ENCOUNTER — HOME CARE VISIT (OUTPATIENT)
Dept: HOME HEALTH SERVICES | Facility: HOME HEALTH | Age: 81
End: 2021-01-19
Payer: MEDICARE

## 2021-01-19 VITALS
RESPIRATION RATE: 18 BRPM | HEART RATE: 86 BPM | OXYGEN SATURATION: 98 % | SYSTOLIC BLOOD PRESSURE: 158 MMHG | TEMPERATURE: 98.2 F | DIASTOLIC BLOOD PRESSURE: 86 MMHG

## 2021-01-20 ENCOUNTER — HOME CARE VISIT (OUTPATIENT)
Dept: SCHEDULING | Facility: HOME HEALTH | Age: 81
End: 2021-01-20
Payer: MEDICARE

## 2021-01-20 VITALS
RESPIRATION RATE: 18 BRPM | DIASTOLIC BLOOD PRESSURE: 86 MMHG | OXYGEN SATURATION: 98 % | SYSTOLIC BLOOD PRESSURE: 152 MMHG | TEMPERATURE: 97.8 F | HEART RATE: 90 BPM

## 2021-01-20 PROCEDURE — G0151 HHCP-SERV OF PT,EA 15 MIN: HCPCS

## 2021-01-22 ENCOUNTER — HOME CARE VISIT (OUTPATIENT)
Dept: SCHEDULING | Facility: HOME HEALTH | Age: 81
End: 2021-01-22
Payer: MEDICARE

## 2021-01-22 VITALS
DIASTOLIC BLOOD PRESSURE: 100 MMHG | OXYGEN SATURATION: 97 % | SYSTOLIC BLOOD PRESSURE: 140 MMHG | HEART RATE: 90 BPM | TEMPERATURE: 98.5 F

## 2021-01-22 VITALS
HEART RATE: 84 BPM | SYSTOLIC BLOOD PRESSURE: 142 MMHG | TEMPERATURE: 97.7 F | DIASTOLIC BLOOD PRESSURE: 82 MMHG | OXYGEN SATURATION: 9 % | RESPIRATION RATE: 16 BRPM

## 2021-01-22 VITALS
OXYGEN SATURATION: 97 % | SYSTOLIC BLOOD PRESSURE: 140 MMHG | RESPIRATION RATE: 20 BRPM | DIASTOLIC BLOOD PRESSURE: 100 MMHG | HEART RATE: 90 BPM | TEMPERATURE: 98.5 F

## 2021-01-22 PROCEDURE — G0299 HHS/HOSPICE OF RN EA 15 MIN: HCPCS

## 2021-01-22 PROCEDURE — G0157 HHC PT ASSISTANT EA 15: HCPCS

## 2021-01-22 PROCEDURE — G0158 HHC OT ASSISTANT EA 15: HCPCS

## 2021-01-25 ENCOUNTER — HOME CARE VISIT (OUTPATIENT)
Dept: SCHEDULING | Facility: HOME HEALTH | Age: 81
End: 2021-01-25
Payer: MEDICARE

## 2021-01-25 VITALS
OXYGEN SATURATION: 98 % | DIASTOLIC BLOOD PRESSURE: 100 MMHG | TEMPERATURE: 97.6 F | SYSTOLIC BLOOD PRESSURE: 164 MMHG | RESPIRATION RATE: 16 BRPM | HEART RATE: 78 BPM

## 2021-01-25 VITALS
TEMPERATURE: 98.9 F | HEART RATE: 20 BPM | OXYGEN SATURATION: 98 % | DIASTOLIC BLOOD PRESSURE: 110 MMHG | SYSTOLIC BLOOD PRESSURE: 160 MMHG

## 2021-01-25 PROCEDURE — G0299 HHS/HOSPICE OF RN EA 15 MIN: HCPCS

## 2021-01-25 PROCEDURE — G0158 HHC OT ASSISTANT EA 15: HCPCS

## 2021-01-26 ENCOUNTER — HOME CARE VISIT (OUTPATIENT)
Dept: SCHEDULING | Facility: HOME HEALTH | Age: 81
End: 2021-01-26
Payer: MEDICARE

## 2021-01-26 VITALS
HEART RATE: 96 BPM | OXYGEN SATURATION: 98 % | SYSTOLIC BLOOD PRESSURE: 140 MMHG | TEMPERATURE: 98.1 F | DIASTOLIC BLOOD PRESSURE: 88 MMHG | RESPIRATION RATE: 17 BRPM

## 2021-01-26 PROCEDURE — G0157 HHC PT ASSISTANT EA 15: HCPCS

## 2021-01-27 ENCOUNTER — HOME CARE VISIT (OUTPATIENT)
Dept: SCHEDULING | Facility: HOME HEALTH | Age: 81
End: 2021-01-27
Payer: MEDICARE

## 2021-01-27 VITALS
TEMPERATURE: 98.1 F | DIASTOLIC BLOOD PRESSURE: 110 MMHG | OXYGEN SATURATION: 98 % | HEART RATE: 84 BPM | SYSTOLIC BLOOD PRESSURE: 170 MMHG

## 2021-01-27 PROCEDURE — G0158 HHC OT ASSISTANT EA 15: HCPCS

## 2021-01-28 ENCOUNTER — HOME CARE VISIT (OUTPATIENT)
Dept: SCHEDULING | Facility: HOME HEALTH | Age: 81
End: 2021-01-28
Payer: MEDICARE

## 2021-01-28 ENCOUNTER — HOME CARE VISIT (OUTPATIENT)
Dept: HOME HEALTH SERVICES | Facility: HOME HEALTH | Age: 81
End: 2021-01-28
Payer: MEDICARE

## 2021-01-28 VITALS
RESPIRATION RATE: 17 BRPM | DIASTOLIC BLOOD PRESSURE: 90 MMHG | SYSTOLIC BLOOD PRESSURE: 142 MMHG | OXYGEN SATURATION: 96 % | TEMPERATURE: 98.1 F | HEART RATE: 92 BPM

## 2021-01-28 VITALS
SYSTOLIC BLOOD PRESSURE: 142 MMHG | HEART RATE: 92 BPM | TEMPERATURE: 98.3 F | DIASTOLIC BLOOD PRESSURE: 78 MMHG | RESPIRATION RATE: 17 BRPM | OXYGEN SATURATION: 95 %

## 2021-01-28 PROCEDURE — G0299 HHS/HOSPICE OF RN EA 15 MIN: HCPCS

## 2021-01-28 PROCEDURE — G0157 HHC PT ASSISTANT EA 15: HCPCS

## 2021-02-02 ENCOUNTER — HOME CARE VISIT (OUTPATIENT)
Dept: SCHEDULING | Facility: HOME HEALTH | Age: 81
End: 2021-02-02
Payer: MEDICARE

## 2021-02-02 VITALS
HEART RATE: 88 BPM | RESPIRATION RATE: 17 BRPM | SYSTOLIC BLOOD PRESSURE: 134 MMHG | DIASTOLIC BLOOD PRESSURE: 78 MMHG | TEMPERATURE: 98 F

## 2021-02-02 PROCEDURE — G0157 HHC PT ASSISTANT EA 15: HCPCS

## 2021-02-03 ENCOUNTER — HOME CARE VISIT (OUTPATIENT)
Dept: SCHEDULING | Facility: HOME HEALTH | Age: 81
End: 2021-02-03
Payer: MEDICARE

## 2021-02-03 VITALS
HEART RATE: 76 BPM | DIASTOLIC BLOOD PRESSURE: 110 MMHG | SYSTOLIC BLOOD PRESSURE: 142 MMHG | TEMPERATURE: 97.5 F | OXYGEN SATURATION: 97 %

## 2021-02-03 VITALS
DIASTOLIC BLOOD PRESSURE: 82 MMHG | TEMPERATURE: 97.7 F | SYSTOLIC BLOOD PRESSURE: 138 MMHG | OXYGEN SATURATION: 95 % | HEART RATE: 85 BPM | RESPIRATION RATE: 16 BRPM

## 2021-02-03 PROCEDURE — G0158 HHC OT ASSISTANT EA 15: HCPCS

## 2021-02-03 PROCEDURE — G0299 HHS/HOSPICE OF RN EA 15 MIN: HCPCS

## 2021-02-04 ENCOUNTER — HOME CARE VISIT (OUTPATIENT)
Dept: SCHEDULING | Facility: HOME HEALTH | Age: 81
End: 2021-02-04
Payer: MEDICARE

## 2021-02-04 ENCOUNTER — HOME CARE VISIT (OUTPATIENT)
Dept: HOME HEALTH SERVICES | Facility: HOME HEALTH | Age: 81
End: 2021-02-04
Payer: MEDICARE

## 2021-02-04 VITALS
TEMPERATURE: 98.1 F | RESPIRATION RATE: 16 BRPM | OXYGEN SATURATION: 98 % | SYSTOLIC BLOOD PRESSURE: 140 MMHG | HEART RATE: 84 BPM | DIASTOLIC BLOOD PRESSURE: 92 MMHG

## 2021-02-04 PROCEDURE — G0157 HHC PT ASSISTANT EA 15: HCPCS

## 2021-02-09 ENCOUNTER — HOME CARE VISIT (OUTPATIENT)
Dept: HOME HEALTH SERVICES | Facility: HOME HEALTH | Age: 81
End: 2021-02-09
Payer: MEDICARE

## 2021-02-11 ENCOUNTER — HOME CARE VISIT (OUTPATIENT)
Dept: SCHEDULING | Facility: HOME HEALTH | Age: 81
End: 2021-02-11
Payer: MEDICARE

## 2021-02-11 VITALS
DIASTOLIC BLOOD PRESSURE: 92 MMHG | HEART RATE: 74 BPM | TEMPERATURE: 98.9 F | SYSTOLIC BLOOD PRESSURE: 158 MMHG | OXYGEN SATURATION: 98 % | RESPIRATION RATE: 18 BRPM

## 2021-02-11 PROCEDURE — G0151 HHCP-SERV OF PT,EA 15 MIN: HCPCS

## 2021-02-11 PROCEDURE — G0152 HHCP-SERV OF OT,EA 15 MIN: HCPCS

## 2021-02-12 VITALS
HEART RATE: 74 BPM | TEMPERATURE: 98.9 F | RESPIRATION RATE: 16 BRPM | OXYGEN SATURATION: 98 % | DIASTOLIC BLOOD PRESSURE: 92 MMHG | SYSTOLIC BLOOD PRESSURE: 158 MMHG

## 2021-10-31 ENCOUNTER — APPOINTMENT (OUTPATIENT)
Dept: GENERAL RADIOLOGY | Age: 81
End: 2021-10-31
Attending: EMERGENCY MEDICINE
Payer: MEDICARE

## 2021-10-31 ENCOUNTER — HOSPITAL ENCOUNTER (EMERGENCY)
Age: 81
Discharge: HOME | End: 2021-10-31
Attending: EMERGENCY MEDICINE
Payer: MEDICARE

## 2021-10-31 VITALS
HEART RATE: 77 BPM | OXYGEN SATURATION: 97 % | SYSTOLIC BLOOD PRESSURE: 160 MMHG | DIASTOLIC BLOOD PRESSURE: 74 MMHG | HEIGHT: 68 IN | RESPIRATION RATE: 16 BRPM | TEMPERATURE: 97.8 F | BODY MASS INDEX: 21.98 KG/M2 | WEIGHT: 145 LBS

## 2021-10-31 DIAGNOSIS — M97.31XA PERIPROSTHETIC FRACTURE AROUND INTERNAL PROSTHETIC RIGHT SHOULDER JOINT, INITIAL ENCOUNTER: Primary | ICD-10-CM

## 2021-10-31 PROCEDURE — 73060 X-RAY EXAM OF HUMERUS: CPT

## 2021-10-31 PROCEDURE — 99284 EMERGENCY DEPT VISIT MOD MDM: CPT

## 2021-10-31 PROCEDURE — 74011250637 HC RX REV CODE- 250/637: Performed by: EMERGENCY MEDICINE

## 2021-10-31 RX ORDER — HYDROCODONE BITARTRATE AND ACETAMINOPHEN 5; 325 MG/1; MG/1
1 TABLET ORAL ONCE
Status: COMPLETED | OUTPATIENT
Start: 2021-10-31 | End: 2021-10-31

## 2021-10-31 RX ORDER — ONDANSETRON 8 MG/1
8 TABLET, ORALLY DISINTEGRATING ORAL
Qty: 12 TABLET | Refills: 0 | Status: SHIPPED | OUTPATIENT
Start: 2021-10-31

## 2021-10-31 RX ORDER — HYDROCODONE BITARTRATE AND ACETAMINOPHEN 7.5; 325 MG/1; MG/1
1 TABLET ORAL
Qty: 12 TABLET | Refills: 0 | Status: SHIPPED | OUTPATIENT
Start: 2021-10-31 | End: 2021-11-03

## 2021-10-31 RX ORDER — ONDANSETRON 8 MG/1
8 TABLET, ORALLY DISINTEGRATING ORAL
Status: COMPLETED | OUTPATIENT
Start: 2021-10-31 | End: 2021-10-31

## 2021-10-31 RX ADMIN — ONDANSETRON 8 MG: 8 TABLET, ORALLY DISINTEGRATING ORAL at 02:23

## 2021-10-31 RX ADMIN — HYDROCODONE BITARTRATE AND ACETAMINOPHEN 1 TABLET: 5; 325 TABLET ORAL at 02:23

## 2021-10-31 NOTE — ED NOTES
I have reviewed discharge instructions with the patient and spouse. The patient and spouse verbalized understanding. Patient left ED via Discharge Method: wheelchair to Home with spouse  Opportunity for questions and clarification provided. Patient given 2 scripts. To continue your aftercare when you leave the hospital, you may receive an automated call from our care team to check in on how you are doing. This is a free service and part of our promise to provide the best care and service to meet your aftercare needs.  If you have questions, or wish to unsubscribe from this service please call [phone]. Thank you for Choosing our [address] [place] [address] Emergency Department.

## 2021-10-31 NOTE — ED TRIAGE NOTES
Pt fell at home, pt regularly unsteady on feet - uses walker. Pt got up to use restroom, fell, hit head. No LOC, no thinners.

## 2021-10-31 NOTE — ED PROVIDER NOTES
Patient arrives to the emergency department by ambulance with a history of fall at home this evening. She states she got tangled up with her walker and fell onto her right arm. She denies head injury or loss of consciousness. She complains of pain to the midportion of the right upper arm. She also sustained an abrasion to the left knee. She denies any other injuries and has no other complaints. The history is provided by the patient. Fall  The accident occurred less than 1 hour ago. The fall occurred while walking. She fell from a height of ground level. She landed on hard floor. There was no blood loss. Point of impact: right side. Pain location: right arm. The pain is at a severity of 3/10 (worse with movement). The pain is mild. She was ambulatory at the scene. There was no entrapment after the fall. There was no drug use involved in the accident. There was no alcohol use involved in the accident. Pertinent negatives include no visual change, no fever, no numbness, no abdominal pain, no bowel incontinence, no nausea, no vomiting, no hematuria, no headaches, no extremity weakness, no hearing loss, no loss of consciousness, no tingling and no laceration. The risk factors include being elderly.          Past Medical History:   Diagnosis Date    Anxiety     Arthritis     Cancer ([facility] [address]     Depression     History of bone density study 08/2016    Dr. Kelin Khanna    Hypercholesterolemia     Hypertension     Onychomycosis     Osteoporosis     Thyroid disease        Past Surgical History:   Procedure Laterality Date    HX CHOLECYSTECTOMY      HX COLONOSCOPY  2012    HX GYN      HX HEENT      HX ORTHOPAEDIC      HX TOTAL COLECTOMY  2003    ME BREAST SURGERY PROCEDURE UNLISTED           Family History:   Problem Relation Age of Onset    Heart Disease Mother     Heart Disease Father        Social History     Socioeconomic History    Marital status:      Spouse name: Not on file    Number of children: 2    Years of education: Not on file    Highest education level: Not on file   Occupational History    Occupation: retired,    Tobacco Use    Smoking status: Former Smoker    Smokeless tobacco: Never Used   Substance and Sexual Activity    Alcohol use: No    Drug use: No    Sexual activity: Not on file   Other Topics Concern    Not on file   Social History Narrative    Not on file     Social Determinants of Health     Financial Resource Strain:     Difficulty of Paying Living Expenses:    Food Insecurity:     Worried About [address] in the Last Year:     [address] [employer] in the Last Year:    Transportation Needs:     Lack of Transportation (Medical):  Lack of Transportation (Non-Medical):    Physical Activity:     Days of Exercise per Week:     Minutes of Exercise per Session:    Stress:     Feeling of Stress :    Social Connections:     Frequency of Communication with Friends and Family:     Frequency of Social Gatherings with Friends and Family:     Attends Taoism Services:     Active Member of Clubs or Organizations:     Attends Club or Organization Meetings:     Marital Status:    Intimate Partner Violence:     Fear of Current or Ex-Partner:     Emotionally Abused:     Physically Abused:     Sexually Abused: ALLERGIES: Codeine, Iodine and iodide containing products, Penicillins, Squid, Adhesive, Ammonium lactate, Amoxicillin, Glutamine-c-quercet-selen-brom, Honey, Procaine, and Streptomycin    Review of Systems   Constitutional: Negative for chills and fever. Gastrointestinal: Negative for abdominal pain, bowel incontinence, nausea and vomiting. Genitourinary: Negative for hematuria. Musculoskeletal: Negative for extremity weakness. Neurological: Negative for tingling, loss of consciousness, numbness and headaches. All other systems reviewed and are negative.       Vitals:    10/31/21 0140   BP: (!) 160/74   Pulse: 77   Resp: 16 Temp: 97.8 °F (36.6 °C)   SpO2: 97%   Weight: 65.8 kg (145 lb)   Height: 5' 8\" (1.727 m)            Physical Exam  Vitals and nursing note reviewed. Constitutional:       General: She is not in acute distress. Appearance: Normal appearance. She is not ill-appearing, toxic-appearing or diaphoretic. HENT:      Head: Normocephalic and atraumatic. Mouth/Throat:      Mouth: Mucous membranes are moist.      Pharynx: Oropharynx is clear. Eyes:      Extraocular Movements: Extraocular movements intact. Conjunctiva/sclera: Conjunctivae normal.      Pupils: Pupils are equal, round, and reactive to light. Cardiovascular:      Rate and Rhythm: Normal rate and regular rhythm. Pulses: Normal pulses. Heart sounds: Normal heart sounds. Chest:      Chest wall: No tenderness. Abdominal:      Tenderness: There is no abdominal tenderness. Musculoskeletal:         General: Tenderness present. No swelling or deformity. Cervical back: Normal range of motion and neck supple. No rigidity or tenderness. Comments: Tenderness noted to the midportion of the right humerus however no crepitus or deformities are noted and there is no swelling or bruising noted. PMS intact distally. Skin:     General: Skin is warm and dry. Findings: No laceration. Comments: Abrasion to the left knee. Neurological:      General: No focal deficit present. Mental Status: She is alert and oriented to person, place, and time. Mental status is at baseline. Psychiatric:         Mood and Affect: Mood normal.         Behavior: Behavior normal.         Thought Content:  Thought content normal.          MDM  Number of Diagnoses or Management Options     Amount and/or Complexity of Data Reviewed  Tests in the radiology section of CPT®: ordered and reviewed (My interpretation there appears to be a nondisplaced periprosthetic fracture of the proximal humeral shaft.)  Independent visualization of images, tracings, or specimens: yes    Risk of Complications, Morbidity, and/or Mortality  Presenting problems: low  Diagnostic procedures: low  Management options: low    Patient Progress  Patient progress: stable         Procedures

## 2021-12-24 ENCOUNTER — ANESTHESIA (OUTPATIENT)
Dept: ENDOSCOPY | Age: 81
End: 2021-12-24
Payer: MEDICARE

## 2021-12-24 ENCOUNTER — ANESTHESIA EVENT (OUTPATIENT)
Dept: ENDOSCOPY | Age: 81
End: 2021-12-24
Payer: MEDICARE

## 2021-12-24 ENCOUNTER — HOSPITAL ENCOUNTER (EMERGENCY)
Age: 81
Discharge: HOME | End: 2021-12-24
Attending: EMERGENCY MEDICINE
Payer: MEDICARE

## 2021-12-24 ENCOUNTER — APPOINTMENT (OUTPATIENT)
Dept: GENERAL RADIOLOGY | Age: 81
End: 2021-12-24
Attending: EMERGENCY MEDICINE
Payer: MEDICARE

## 2021-12-24 VITALS
HEIGHT: 68 IN | RESPIRATION RATE: 18 BRPM | TEMPERATURE: 98.7 F | OXYGEN SATURATION: 94 % | BODY MASS INDEX: 19.7 KG/M2 | SYSTOLIC BLOOD PRESSURE: 165 MMHG | WEIGHT: 130 LBS | HEART RATE: 85 BPM | DIASTOLIC BLOOD PRESSURE: 77 MMHG

## 2021-12-24 DIAGNOSIS — T18.108A ESOPHAGEAL FOREIGN BODY, INITIAL ENCOUNTER: Primary | ICD-10-CM

## 2021-12-24 LAB
ALBUMIN SERPL-MCNC: 3.4 G/DL (ref 3.2–4.6)
ALBUMIN/GLOB SERPL: 0.7 {RATIO} (ref 1.2–3.5)
ALP SERPL-CCNC: 141 U/L (ref 50–136)
ALT SERPL-CCNC: 17 U/L (ref 12–65)
ANION GAP SERPL CALC-SCNC: 7 MMOL/L (ref 7–16)
AST SERPL-CCNC: 26 U/L (ref 15–37)
BASOPHILS # BLD: 0.1 K/UL (ref 0–0.2)
BASOPHILS NFR BLD: 1 % (ref 0–2)
BILIRUB SERPL-MCNC: 0.7 MG/DL (ref 0.2–1.1)
BUN SERPL-MCNC: 21 MG/DL (ref 8–23)
CALCIUM SERPL-MCNC: 9.1 MG/DL (ref 8.3–10.4)
CHLORIDE SERPL-SCNC: 104 MMOL/L (ref 98–107)
CO2 SERPL-SCNC: 28 MMOL/L (ref 21–32)
COVID-19 RAPID TEST, COVR: NOT DETECTED
CREAT SERPL-MCNC: 0.97 MG/DL (ref 0.6–1)
DIFFERENTIAL METHOD BLD: ABNORMAL
EOSINOPHIL # BLD: 0.9 K/UL (ref 0–0.8)
EOSINOPHIL NFR BLD: 10 % (ref 0.5–7.8)
ERYTHROCYTE [DISTWIDTH] IN BLOOD BY AUTOMATED COUNT: 14.2 % (ref 11.9–14.6)
GLOBULIN SER CALC-MCNC: 4.7 G/DL (ref 2.3–3.5)
GLUCOSE SERPL-MCNC: 107 MG/DL (ref 65–100)
HCT VFR BLD AUTO: 33.8 % (ref 35.8–46.3)
HGB BLD-MCNC: 10.7 G/DL (ref 11.7–15.4)
IMM GRANULOCYTES # BLD AUTO: 0 K/UL (ref 0–0.5)
IMM GRANULOCYTES NFR BLD AUTO: 0 % (ref 0–5)
INR PPP: 1
LIPASE SERPL-CCNC: 23 U/L (ref 73–393)
LYMPHOCYTES # BLD: 1.4 K/UL (ref 0.5–4.6)
LYMPHOCYTES NFR BLD: 16 % (ref 13–44)
MCH RBC QN AUTO: 28.9 PG (ref 26.1–32.9)
MCHC RBC AUTO-ENTMCNC: 31.7 G/DL (ref 31.4–35)
MCV RBC AUTO: 91.4 FL (ref 79.6–97.8)
MONOCYTES # BLD: 0.7 K/UL (ref 0.1–1.3)
MONOCYTES NFR BLD: 8 % (ref 4–12)
NEUTS SEG # BLD: 5.9 K/UL (ref 1.7–8.2)
NEUTS SEG NFR BLD: 66 % (ref 43–78)
NRBC # BLD: 0 K/UL (ref 0–0.2)
PLATELET # BLD AUTO: 298 K/UL (ref 150–450)
PMV BLD AUTO: 8.7 FL (ref 9.4–12.3)
POTASSIUM SERPL-SCNC: 3.7 MMOL/L (ref 3.5–5.1)
PROT SERPL-MCNC: 8.1 G/DL (ref 6.3–8.2)
PROTHROMBIN TIME: 13.5 SEC (ref 12.6–14.5)
RBC # BLD AUTO: 3.7 M/UL (ref 4.05–5.2)
SODIUM SERPL-SCNC: 139 MMOL/L (ref 136–145)
SOURCE, COVRS: NORMAL
WBC # BLD AUTO: 9 K/UL (ref 4.3–11.1)

## 2021-12-24 PROCEDURE — 74011000250 HC RX REV CODE- 250: Performed by: NURSE ANESTHETIST, CERTIFIED REGISTERED

## 2021-12-24 PROCEDURE — 88312 SPECIAL STAINS GROUP 1: CPT

## 2021-12-24 PROCEDURE — 77030031656 HC FCPS ENDO GRSP DISP BSC -B: Performed by: STUDENT IN AN ORGANIZED HEALTH CARE EDUCATION/TRAINING PROGRAM

## 2021-12-24 PROCEDURE — 85610 PROTHROMBIN TIME: CPT

## 2021-12-24 PROCEDURE — 77030021678 HC GLIDESCP STAT DISP VERT -B: Performed by: ANESTHESIOLOGY

## 2021-12-24 PROCEDURE — 87635 SARS-COV-2 COVID-19 AMP PRB: CPT

## 2021-12-24 PROCEDURE — C1726 CATH, BAL DIL, NON-VASCULAR: HCPCS | Performed by: STUDENT IN AN ORGANIZED HEALTH CARE EDUCATION/TRAINING PROGRAM

## 2021-12-24 PROCEDURE — 77030014179 HC APPL CLP RESOL BSC -C: Performed by: STUDENT IN AN ORGANIZED HEALTH CARE EDUCATION/TRAINING PROGRAM

## 2021-12-24 PROCEDURE — 76040000026: Performed by: STUDENT IN AN ORGANIZED HEALTH CARE EDUCATION/TRAINING PROGRAM

## 2021-12-24 PROCEDURE — 80053 COMPREHEN METABOLIC PANEL: CPT

## 2021-12-24 PROCEDURE — 74011250636 HC RX REV CODE- 250/636: Performed by: NURSE ANESTHETIST, CERTIFIED REGISTERED

## 2021-12-24 PROCEDURE — C9113 INJ PANTOPRAZOLE SODIUM, VIA: HCPCS | Performed by: EMERGENCY MEDICINE

## 2021-12-24 PROCEDURE — 96375 TX/PRO/DX INJ NEW DRUG ADDON: CPT

## 2021-12-24 PROCEDURE — 88305 TISSUE EXAM BY PATHOLOGIST: CPT

## 2021-12-24 PROCEDURE — 77030021593 HC FCPS BIOP ENDOSC BSC -A: Performed by: STUDENT IN AN ORGANIZED HEALTH CARE EDUCATION/TRAINING PROGRAM

## 2021-12-24 PROCEDURE — 70360 X-RAY EXAM OF NECK: CPT

## 2021-12-24 PROCEDURE — 76060000033 HC ANESTHESIA 1 TO 1.5 HR: Performed by: STUDENT IN AN ORGANIZED HEALTH CARE EDUCATION/TRAINING PROGRAM

## 2021-12-24 PROCEDURE — 74011250636 HC RX REV CODE- 250/636: Performed by: EMERGENCY MEDICINE

## 2021-12-24 PROCEDURE — 96361 HYDRATE IV INFUSION ADD-ON: CPT

## 2021-12-24 PROCEDURE — 2709999900 HC NON-CHARGEABLE SUPPLY: Performed by: STUDENT IN AN ORGANIZED HEALTH CARE EDUCATION/TRAINING PROGRAM

## 2021-12-24 PROCEDURE — 85025 COMPLETE CBC W/AUTO DIFF WBC: CPT

## 2021-12-24 PROCEDURE — 74011000250 HC RX REV CODE- 250: Performed by: EMERGENCY MEDICINE

## 2021-12-24 PROCEDURE — 96374 THER/PROPH/DIAG INJ IV PUSH: CPT

## 2021-12-24 PROCEDURE — 83690 ASSAY OF LIPASE: CPT

## 2021-12-24 PROCEDURE — 99285 EMERGENCY DEPT VISIT HI MDM: CPT

## 2021-12-24 PROCEDURE — 77030037088 HC TUBE ENDOTRACH ORAL NSL COVD-A: Performed by: ANESTHESIOLOGY

## 2021-12-24 RX ORDER — LIDOCAINE HYDROCHLORIDE 20 MG/ML
INJECTION, SOLUTION EPIDURAL; INFILTRATION; INTRACAUDAL; PERINEURAL AS NEEDED
Status: DISCONTINUED | OUTPATIENT
Start: 2021-12-24 | End: 2021-12-24 | Stop reason: HOSPADM

## 2021-12-24 RX ORDER — PROPOFOL 10 MG/ML
INJECTION, EMULSION INTRAVENOUS AS NEEDED
Status: DISCONTINUED | OUTPATIENT
Start: 2021-12-24 | End: 2021-12-24 | Stop reason: HOSPADM

## 2021-12-24 RX ORDER — HYDROMORPHONE HYDROCHLORIDE 1 MG/ML
0.25 INJECTION, SOLUTION INTRAMUSCULAR; INTRAVENOUS; SUBCUTANEOUS
Status: CANCELLED | OUTPATIENT
Start: 2021-12-24

## 2021-12-24 RX ORDER — NALOXONE HYDROCHLORIDE 0.4 MG/ML
0.1 INJECTION, SOLUTION INTRAMUSCULAR; INTRAVENOUS; SUBCUTANEOUS
Status: CANCELLED | OUTPATIENT
Start: 2021-12-24

## 2021-12-24 RX ORDER — SUCCINYLCHOLINE CHLORIDE 20 MG/ML
INJECTION INTRAMUSCULAR; INTRAVENOUS AS NEEDED
Status: DISCONTINUED | OUTPATIENT
Start: 2021-12-24 | End: 2021-12-24 | Stop reason: HOSPADM

## 2021-12-24 RX ORDER — SODIUM CHLORIDE, SODIUM LACTATE, POTASSIUM CHLORIDE, CALCIUM CHLORIDE 600; 310; 30; 20 MG/100ML; MG/100ML; MG/100ML; MG/100ML
1000 INJECTION, SOLUTION INTRAVENOUS ONCE
Status: COMPLETED | OUTPATIENT
Start: 2021-12-24 | End: 2021-12-24

## 2021-12-24 RX ORDER — SODIUM CHLORIDE, SODIUM LACTATE, POTASSIUM CHLORIDE, CALCIUM CHLORIDE 600; 310; 30; 20 MG/100ML; MG/100ML; MG/100ML; MG/100ML
INJECTION, SOLUTION INTRAVENOUS
Status: DISCONTINUED | OUTPATIENT
Start: 2021-12-24 | End: 2021-12-24 | Stop reason: HOSPADM

## 2021-12-24 RX ORDER — ONDANSETRON 2 MG/ML
INJECTION INTRAMUSCULAR; INTRAVENOUS AS NEEDED
Status: DISCONTINUED | OUTPATIENT
Start: 2021-12-24 | End: 2021-12-24 | Stop reason: HOSPADM

## 2021-12-24 RX ORDER — OXYCODONE HYDROCHLORIDE 5 MG/1
5 TABLET ORAL
Status: CANCELLED | OUTPATIENT
Start: 2021-12-24 | End: 2021-12-25

## 2021-12-24 RX ORDER — SODIUM CHLORIDE, SODIUM LACTATE, POTASSIUM CHLORIDE, CALCIUM CHLORIDE 600; 310; 30; 20 MG/100ML; MG/100ML; MG/100ML; MG/100ML
75 INJECTION, SOLUTION INTRAVENOUS CONTINUOUS
Status: CANCELLED | OUTPATIENT
Start: 2021-12-24

## 2021-12-24 RX ORDER — FLUMAZENIL 0.1 MG/ML
0.2 INJECTION INTRAVENOUS AS NEEDED
Status: CANCELLED | OUTPATIENT
Start: 2021-12-24

## 2021-12-24 RX ORDER — ROCURONIUM BROMIDE 10 MG/ML
INJECTION, SOLUTION INTRAVENOUS AS NEEDED
Status: DISCONTINUED | OUTPATIENT
Start: 2021-12-24 | End: 2021-12-24 | Stop reason: HOSPADM

## 2021-12-24 RX ORDER — EPHEDRINE SULFATE/0.9% NACL/PF 50 MG/5 ML
SYRINGE (ML) INTRAVENOUS AS NEEDED
Status: DISCONTINUED | OUTPATIENT
Start: 2021-12-24 | End: 2021-12-24 | Stop reason: HOSPADM

## 2021-12-24 RX ORDER — ONDANSETRON 2 MG/ML
4 INJECTION INTRAMUSCULAR; INTRAVENOUS
Status: COMPLETED | OUTPATIENT
Start: 2021-12-24 | End: 2021-12-24

## 2021-12-24 RX ORDER — DIPHENHYDRAMINE HYDROCHLORIDE 50 MG/ML
12.5 INJECTION, SOLUTION INTRAMUSCULAR; INTRAVENOUS
Status: CANCELLED | OUTPATIENT
Start: 2021-12-24

## 2021-12-24 RX ORDER — ESMOLOL HYDROCHLORIDE 10 MG/ML
INJECTION INTRAVENOUS AS NEEDED
Status: DISCONTINUED | OUTPATIENT
Start: 2021-12-24 | End: 2021-12-24 | Stop reason: HOSPADM

## 2021-12-24 RX ADMIN — PHENYLEPHRINE HYDROCHLORIDE 50 MCG: 10 INJECTION INTRAVENOUS at 08:01

## 2021-12-24 RX ADMIN — GLUCAGON HYDROCHLORIDE 2 MG: 1 INJECTION, POWDER, FOR SOLUTION INTRAMUSCULAR; INTRAVENOUS; SUBCUTANEOUS at 03:35

## 2021-12-24 RX ADMIN — ONDANSETRON 4 MG: 2 INJECTION INTRAMUSCULAR; INTRAVENOUS at 03:35

## 2021-12-24 RX ADMIN — PANTOPRAZOLE SODIUM 40 MG: 40 INJECTION, POWDER, FOR SOLUTION INTRAVENOUS at 03:05

## 2021-12-24 RX ADMIN — PROPOFOL 80 MG: 10 INJECTION, EMULSION INTRAVENOUS at 07:31

## 2021-12-24 RX ADMIN — ROCURONIUM BROMIDE 5 MG: 50 INJECTION, SOLUTION INTRAVENOUS at 07:30

## 2021-12-24 RX ADMIN — LIDOCAINE HYDROCHLORIDE 50 MG: 20 INJECTION, SOLUTION EPIDURAL; INFILTRATION; INTRACAUDAL; PERINEURAL at 07:45

## 2021-12-24 RX ADMIN — ESMOLOL HYDROCHLORIDE 10 MG: 10 INJECTION, SOLUTION INTRAVENOUS at 07:49

## 2021-12-24 RX ADMIN — ONDANSETRON 4 MG: 2 INJECTION INTRAMUSCULAR; INTRAVENOUS at 07:47

## 2021-12-24 RX ADMIN — PHENYLEPHRINE HYDROCHLORIDE 100 MCG: 10 INJECTION INTRAVENOUS at 07:46

## 2021-12-24 RX ADMIN — Medication 10 MG: at 07:55

## 2021-12-24 RX ADMIN — SODIUM CHLORIDE, SODIUM LACTATE, POTASSIUM CHLORIDE, AND CALCIUM CHLORIDE: 600; 310; 30; 20 INJECTION, SOLUTION INTRAVENOUS at 07:20

## 2021-12-24 RX ADMIN — SUCCINYLCHOLINE CHLORIDE 80 MG: 20 INJECTION, SOLUTION INTRAMUSCULAR; INTRAVENOUS at 07:31

## 2021-12-24 RX ADMIN — PROPOFOL 10 MG: 10 INJECTION, EMULSION INTRAVENOUS at 07:32

## 2021-12-24 RX ADMIN — LIDOCAINE HYDROCHLORIDE 50 MG: 20 INJECTION, SOLUTION EPIDURAL; INFILTRATION; INTRACAUDAL; PERINEURAL at 07:30

## 2021-12-24 RX ADMIN — SODIUM CHLORIDE, SODIUM LACTATE, POTASSIUM CHLORIDE, AND CALCIUM CHLORIDE 1000 ML: 600; 310; 30; 20 INJECTION, SOLUTION INTRAVENOUS at 03:31

## 2021-12-24 NOTE — PROCEDURES
ESOPHAGOGASTRODUODENOSCOPY    DATE of PROCEDURE:   12/24/2021    PRE-OP DIAGNOSIS:  1. Esophageal foreign body  2. Chronic dysphagia     POST-OP DIAGNOSIS:  1. Esophageal foreign body, which was passed into the stomach  2. 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 Esophagitis, possible EOE  4. Paraesophageal hiatal hernia  5. Gastric erythema  6. 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 The endoscope was advanced into both afferent and efferent limbs of the Billroth II anastomosis under direct vision without difficulty. The esophagus, stomach (including retroflexed views) and small intestine were evaluated. The patient was taken to the recovery area in stable condition. FINDINGS:  ESOPHAGUS: A food bolus was encountered at the level of the UES at 18 cm. Rat tooth forceps were passed through the scope to attempt extraction, but the bolus moved distally without significant resistance and was able to be passed easily into the stomach. After this was cleared, a stricture ~8 mm in diameter was noted at the level of the UES with mild underlying esophagitis vs circumferential polypoid tissue. Dilation was performed to 10, 11, and 12 mm using a TTS balloon dilator. Biopsies were then obtained at the level of the UES. Mild trachealization was noted throughout the entire esophagus consistent with possible EOE. Biopsies were obtained from the mid esophagus. The Z line was intact. STOMACH: A 2 cm paraesophageal hiatal hernia was noted, with Z line located at 42 cm and diaphragmatic hiatus at 44 cm from the incisors. Moderate linear erythema consistent with gastritis was noted throughout the stomach. An apparent Billroth II gastroenteric anastomosis was noted with a few small, clean-based anastomotic ulcers present.  Random gastric biopsies were obtained. After dilation and esophageal biopsies were performed, the stomach was re-examined with a fair amount of blood present, and one of the biopsy sites was found to be oozing significantly. A single hemostatic clip was deployed with successful hemostasis. SMALL INTESTINE: Normal afferent and efferent limbs for the extent examined.     ESTIMATED BLOOD LOSS:  Minimal.    PLAN:  - Follow up pathology, will treat H pylori if positive  - Begin Omeprazole 40 mg ACB  - GI soft diet, ground meat only!  - Avoid NSAIDs  - EtOH cessation  - Will benefit from additional dilation  - ROV in 3-4 weeks      Adolfo Acuña MD  [facility]

## 2021-12-24 NOTE — ANESTHESIA PREPROCEDURE EVALUATION
Relevant Problems   RESPIRATORY SYSTEM   (+) Obstructive sleep apnea syndrome      NEUROLOGY   (+) Anxiety disorder      CARDIOVASCULAR   (+) Essential hypertension      GASTROINTESTINAL   (+) Gastroesophageal reflux disease without esophagitis      HEMATOLOGY   (+) Pernicious anemia       Anesthetic History   No history of anesthetic complications            Review of Systems / Medical History  Patient summary reviewed and pertinent labs reviewed    Pulmonary                   Neuro/Psych         Psychiatric history    Comments: Subdural hematoma 2018 after fall Cardiovascular    Hypertension (Recently taken off meds due to orthostatic hypotension)          Hyperlipidemia    Exercise tolerance: <4 METS: Ambulates with walker due to impaired balance     GI/Hepatic/Renal               Comments: Esophageal food bolus Endo/Other      Hypothyroidism: well controlled  Arthritis     Other Findings              Physical Exam    Airway  Mallampati: II  TM Distance: 4 - 6 cm  Neck ROM: decreased range of motion   Mouth opening: Normal     Cardiovascular    Rhythm: regular  Rate: normal         Dental    Dentition: Edentulous     Pulmonary  Breath sounds clear to auscultation               Abdominal         Other Findings            Anesthetic Plan    ASA: 3, emergent  Anesthesia type: general          Induction: RSI  Anesthetic plan and risks discussed with: Patient      12/24 post procedure notation: This frail patient had significant arrhythmia; it appeared to be SVT, or some other narrow complex tachycardia that she went into and out of throughout the case. Improved with esmolol for a time, however she then had a significant brief episode of hypotension following this small administration, unclear if it was related. If taken to OR again for a small procedure, I would want her electrolytes to be optimized.   For a more significant procedure a EKG/holter/cards visit to further characterize and optimize/treat her arrhythmia would be ideal.

## 2021-12-24 NOTE — ED PROVIDER NOTES
49-year-old female presents from home with concerns over something stuck in her throat    Patient reports that she was eating a pork chop around 7:00 this evening when she feels like a piece got stuck. Patient reports that she has had similar episodes in the past, but they always cleared on their own. No history of prior endoscopy  Denies a history of gastritis  No recent surgeries    The history is provided by the patient and the spouse. Foreign Body Swallowed  The current episode started 6 to 12 hours ago. The foreign body is suspected to be swallowed. The foreign body is food. The incident was witnessed. The incident was witnessed/reported by the patient. Associated symptoms include congestion, trouble swallowing and choking. Pertinent negatives include no fever, no difficulty breathing, no cough, no chest pain, no vomiting and no abdominal pain. Associated medical issues do not include prior foreign body removal or esophageal disease.         Past Medical History:   Diagnosis Date    Anxiety     Arthritis     Cancer ([facility] [address]     Depression     History of bone density study 08/2016    Dr. Nacho Valenzuela    Hypercholesterolemia     Hypertension     Onychomycosis     Osteoporosis     Thyroid disease        Past Surgical History:   Procedure Laterality Date    HX CHOLECYSTECTOMY      HX COLONOSCOPY  2012    HX GYN      HX HEENT      HX ORTHOPAEDIC      HX TOTAL COLECTOMY  2003    AL BREAST SURGERY PROCEDURE UNLISTED           Family History:   Problem Relation Age of Onset    Heart Disease Mother     Heart Disease Father        Social History     Socioeconomic History    Marital status:      Spouse name: Not on file    Number of children: 2    Years of education: Not on file    Highest education level: Not on file   Occupational History    Occupation: retired,    Tobacco Use    Smoking status: Former Smoker    Smokeless tobacco: Never Used   Substance and Sexual Activity    Alcohol use: No    Drug use: No    Sexual activity: Not on file   Other Topics Concern    Not on file   Social History Narrative    Not on file     Social Determinants of Health     Financial Resource Strain:     Difficulty of Paying Living Expenses: Not on file   Food Insecurity:     Worried About Running Out of Food in the Last Year: Not on file    Mervat of Food in the Last Year: Not on file   Transportation Needs:     Lack of Transportation (Medical): Not on file    Lack of Transportation (Non-Medical): Not on file   Physical Activity:     Days of Exercise per Week: Not on file    Minutes of Exercise per Session: Not on file   Stress:     Feeling of Stress : Not on file   Social Connections:     Frequency of Communication with Friends and Family: Not on file    Frequency of Social Gatherings with Friends and Family: Not on file    Attends Voodoo Services: Not on file    Active Member of [address] [place] [address] or Organizations: Not on file    Attends Club or Organization Meetings: Not on file    Marital Status: Not on file   Intimate Partner Violence:     Fear of Current or Ex-Partner: Not on file    Emotionally Abused: Not on file    Physically Abused: Not on file    Sexually Abused: Not on file   Housing Stability:     Unable to Pay for Housing in the Last Year: Not on file    Number of [place] in the Last Year: Not on file    Unstable Housing in the Last Year: Not on file         ALLERGIES: Codeine, Iodine and iodide containing products, Penicillins, Squid, Adhesive, Ammonium lactate, Amoxicillin, Glutamine-c-quercet-selen-brom, Honey, Procaine, and Streptomycin    Review of Systems   Constitutional: Negative for chills and fever. HENT: Positive for congestion and trouble swallowing. Negative for ear pain and rhinorrhea. Eyes: Negative for photophobia and discharge. Respiratory: Positive for choking. Negative for cough and shortness of breath.     Cardiovascular: Negative for chest pain and palpitations. Gastrointestinal: Negative for abdominal pain, constipation, diarrhea and vomiting. Endocrine: Negative for cold intolerance and heat intolerance. Genitourinary: Negative for dysuria and flank pain. Musculoskeletal: Negative for arthralgias, myalgias and neck pain. Skin: Negative for rash and wound. Allergic/Immunologic: Negative for environmental allergies and food allergies. Neurological: Negative for syncope and headaches. Hematological: Negative for adenopathy. Does not bruise/bleed easily. Psychiatric/Behavioral: Negative for dysphoric mood. The patient is not nervous/anxious. All other systems reviewed and are negative. Vitals:    12/24/21 0130   BP: (!) 165/96   Pulse: 85   Resp: 18   Temp: 98.4 °F (36.9 °C)   SpO2: 99%   Weight: 59 kg (130 lb)   Height: 5' 8\" (1.727 m)            Physical Exam  Vitals and nursing note reviewed. Constitutional:       General: She is in acute distress. Appearance: Normal appearance. She is well-developed and normal weight. HENT:      Head: Normocephalic and atraumatic. Right Ear: External ear normal.      Left Ear: External ear normal.      Mouth/Throat:      Mouth: Mucous membranes are moist.      Pharynx: Oropharynx is clear. No oropharyngeal exudate. Eyes:      Extraocular Movements: Extraocular movements intact. Conjunctiva/sclera: Conjunctivae normal.      Pupils: Pupils are equal, round, and reactive to light. Neck:      Vascular: No JVD. Cardiovascular:      Rate and Rhythm: Normal rate and regular rhythm. Heart sounds: Normal heart sounds. No murmur heard. No friction rub. No gallop. Pulmonary:      Effort: Pulmonary effort is normal.      Breath sounds: Normal breath sounds. Abdominal:      General: Bowel sounds are normal. There is no distension. Palpations: Abdomen is soft. There is no mass. Tenderness: There is no abdominal tenderness.    Musculoskeletal:         General: No deformity. Normal range of motion. Cervical back: Normal range of motion and neck supple. Skin:     General: Skin is warm and dry. Capillary Refill: Capillary refill takes less than 2 seconds. Findings: No rash. Neurological:      General: No focal deficit present. Mental Status: She is alert and oriented to person, place, and time. Cranial Nerves: No cranial nerve deficit. Sensory: No sensory deficit. Gait: Gait normal.   Psychiatric:         Mood and Affect: Mood is anxious. Speech: Speech normal.         Behavior: Behavior normal.         Thought Content:  Thought content normal.         Cognition and Memory: Cognition and memory normal.         Judgment: Judgment normal.          MDM  Number of Diagnoses or Management Options  Esophageal foreign body, initial encounter: new and requires workup  Diagnosis management comments: 27-year-old female with likely esophageal foreign body  Soft tissue neck x-rays are unremarkable    Will check basic labs  Start IV fluids, Protonix, Zofran and a dose of glucagon    We will consult gastroenterology for definitive management    3:30 AM  Reviewed case with Dr. Isak Cid from gastroenterology  They will take the patient for endoscopy       Amount and/or Complexity of Data Reviewed  Clinical lab tests: ordered and reviewed  Tests in the radiology section of CPT®: ordered and reviewed  Tests in the medicine section of CPT®: ordered and reviewed  Decide to obtain previous medical records or to obtain history from someone other than the patient: yes  Obtain history from someone other than the patient: yes  Review and summarize past medical records: yes  Independent visualization of images, tracings, or specimens: yes    Risk of Complications, Morbidity, and/or Mortality  Presenting problems: high  Diagnostic procedures: moderate  Management options: moderate  General comments: Elements of this note have been dictated via voice recognition software. Text and phrases may be limited by the accuracy of the software. The chart has been reviewed, but errors may still be present.       Patient Progress  Patient progress: improved         Procedures

## 2021-12-24 NOTE — CONSULTS
[facility] Consult Note       Primary GI Physician: ? - Dr. Cinthia Piña    Referring Provider:  Dr. Vazquez Pena Date:  12/24/2021    Admit Date:  12/24/2021    Chief Complaint:  Esophageal foreign body    Subjective:     History of Present Illness:  Patient is a 78 y.o. female with PMH including but not limited to HTN, HLD, osteoporosis, GERD, anxiety, depression, and left breast cancer s/p bilateral mastectomy 2011 who is seen in consultation at the request of Dr. Jeffry Lemons for esophageal foreign body impaction. Was eating pork chops for dinner around 7pm and felt a bite become lodged in her chest. Unable to tolerate clear liquids per the ED, although she reports she was given some hot water and thinks it may have passed but still feels significant discomfort. Reports progressively worsening solid-food dysphagia over the past 2 years and has felt food get stuck at least a dozen times, but usually passes spontaneously. Has not sought medical attention for this. No prior EGD. Denies significant heartburn or reflux, no abdominal pain. Denies NSAID or [facility] AUTHORITY use. Of note, [facility] lists ?total colectomy; however, patient adamantly denies any prior surgery on her colon and no abdominal scar noted on exam. Last colonoscopy was ? 2012, pt unsure of results. PMH:  Past Medical History:   Diagnosis Date    Anxiety     Arthritis     Cancer ([facility] [address]     Depression     History of bone density study 08/2016    Dr. Marrero Speaks    Hypercholesterolemia     Hypertension     Onychomycosis     Osteoporosis     Thyroid disease        PSH:  Past Surgical History:   Procedure Laterality Date    HX CHOLECYSTECTOMY      HX COLONOSCOPY  2012    HX GYN      HX HEENT      HX ORTHOPAEDIC      HX TOTAL COLECTOMY  2003    [place] BREAST SURGERY PROCEDURE UNLISTED         Allergies:   Allergies   Allergen Reactions    Codeine Anaphylaxis    Iodine And Iodide Containing Products Anaphylaxis    Penicillins Anaphylaxis and Swelling    Squid Swelling     Black squid ink used in pasta--- makes patients tongue swell     Adhesive Unknown (comments)    Ammonium Lactate Other (comments)     Topical burn    Amoxicillin Other (comments)     Swelling internal and external    Glutamine-C-Quercet-Selen-Brom Unknown (comments)     Whole wheat flour    Honey Hives    Procaine Other (comments)     Severe mouth sores    Streptomycin Swelling and Hives       Home Medications:  Prior to Admission medications    Medication Sig Start Date End Date Taking? Authorizing Provider   ondansetron (Zofran ODT) 8 mg disintegrating tablet Take 1 Tablet by mouth every eight (8) hours as needed for Nausea. 10/31/21   Tunde Kelly,    busPIRone (BUSPAR) 5 mg tablet Take 1 Tablet by mouth two (2) times daily as needed (anxiety). 10/29/21   Gwyn Huertas MD   teriparatide (Forteo) 20 mcg/dose (620mcg/2.48mL) pnij SubQ injection Inject 0.08 mL (20 mcg) under the skin daily 4/15/21   Provider, Historical   levoFLOXacin (LEVAQUIN) 500 mg tablet Take 500 mg by mouth daily. Provider, Historical   DULoxetine (Cymbalta) 60 mg capsule Take 1 capsule (60 mg) by mouth 2 (two) times a day. 6/8/21   Gwyn Huertas MD   Syringe with Needle, Safety 3 mL 23 gauge x 1\" syrg For B12 injections q2 weeks 6/8/21   Gwyn Huertas MD   pravastatin (PRAVACHOL) 40 mg tablet TK 1 T PO Q NIGHT 6/8/21   Gwyn Huertas MD   magic mouthwash solution Magic mouth wash: Maalox, Lidocaine 2% viscous, Diphenhydramine oral solution; Pharmacy to mix equal portions of ingredients to a total volume as indicated in the dispense amount. 5 mL po qid prn x10 days 6/8/21   Gwyn Huertas MD   losartan (COZAAR) 50 mg tablet Take 1 Tablet by mouth daily. 6/8/21   Gwyn Huertas MD   cyanocobalamin (VITAMIN B12) 1,000 mcg/mL injection Inject 1 mL as directed every 14 (fourteen) days.  6/8/21   Gwyn Huertas MD   amLODIPine (NORVASC) 5 mg tablet Take 5 mg by mouth every morning 6/8/21 Jerald Siddiqi MD   triamcinolone acetonide (KENALOG) 0.025 % topical cream Apply 2.5 Each to affected area two (2) times a day. use thin layer   Indications: inflammation of the skin due to an allergy, apply to other body parts 6/8/21   Jerald Siddiqi MD   DISABLED PLACARD (DISABLED PLACARD) DMV Handicap placard, OA, chronic pain 6/8/21   Jerald Siddiqi MD   OTHER,NON-FORMULARY, Multi For Her + Omega 3 Gummies. Chew 6 gummies daily    Provider, Historical   calcium phosphate trib/vit D3 (CALTRATE GUMMY BITES PO) Chew two gummies once daily    Provider, Historical   cyclobenzaprine (FLEXERIL) 10 mg tablet Take 10 mg by mouth nightly. 11/13/20   Jerald Siddiqi MD   ergocalciferol (ERGOCALCIFEROL) 1,250 mcg (50,000 unit) capsule TK 1 C PO 1 TIME A WK 4/15/20   Jerald Siddiqi MD   DISABLED PLACARD (DISABLED PLACARD) DMV Handicap Placard, hx of OA, M15.9 9/27/19   Jerald Siddiqi MD   therapeutic multivitamin (THEREMS) tablet Take 1 Tab by mouth daily. Provider, Historical   Insulin Needles, Disposable, 31 gauge x 5/16\" ndle 1 each by Miscellaneous route daily. 10/8/18   Provider, Historical   glucosamine/chondr maradiaga A sod (GLUCOSAMINE-CHONDROITIN) 750-600 mg tab Take  by mouth two (2) times a day. Provider, Historical   ascorbic acid, vitamin C, (VITAMIN C) 500 mg tablet Take 500 mg by mouth daily. Provider, Historical   DISABLED PLACARD (DISABLED PLACARD) DMV Supply prescription to [facility] with completed form RG-007A. Provider, Historical   BIOTIN PO Take 5,000 mcg by mouth daily. Provider, Historical       Hospital Medications:  No current facility-administered medications for this encounter. Current Outpatient Medications   Medication Sig    ondansetron (Zofran ODT) 8 mg disintegrating tablet Take 1 Tablet by mouth every eight (8) hours as needed for Nausea.  busPIRone (BUSPAR) 5 mg tablet Take 1 Tablet by mouth two (2) times daily as needed (anxiety).     teriparatide (Forteo) 20 mcg/dose (620mcg/2.48mL) pnij SubQ injection Inject 0.08 mL (20 mcg) under the skin daily    levoFLOXacin (LEVAQUIN) 500 mg tablet Take 500 mg by mouth daily.  DULoxetine (Cymbalta) 60 mg capsule Take 1 capsule (60 mg) by mouth 2 (two) times a day.  Syringe with Needle, Safety 3 mL 23 gauge x 1\" syrg For B12 injections q2 weeks    pravastatin (PRAVACHOL) 40 mg tablet TK 1 T PO Q NIGHT    magic mouthwash solution Magic mouth wash: Maalox, Lidocaine 2% viscous, Diphenhydramine oral solution; Pharmacy to mix equal portions of ingredients to a total volume as indicated in the dispense amount. 5 mL po qid prn x10 days    losartan (COZAAR) 50 mg tablet Take 1 Tablet by mouth daily.  cyanocobalamin (VITAMIN B12) 1,000 mcg/mL injection Inject 1 mL as directed every 14 (fourteen) days.  amLODIPine (NORVASC) 5 mg tablet Take 5 mg by mouth every morning    triamcinolone acetonide (KENALOG) 0.025 % topical cream Apply 2.5 Each to affected area two (2) times a day. use thin layer   Indications: inflammation of the skin due to an allergy, apply to other body parts    DISABLED PLACARD (DISABLED PLACARD) DMV Handicap placard, OA, chronic pain    OTHER,NON-FORMULARY, Multi For Her + Omega 3 Gummies. Chew 6 gummies daily    calcium phosphate trib/vit D3 (CALTRATE GUMMY BITES PO) Chew two gummies once daily    cyclobenzaprine (FLEXERIL) 10 mg tablet Take 10 mg by mouth nightly.  ergocalciferol (ERGOCALCIFEROL) 1,250 mcg (50,000 unit) capsule TK 1 C PO 1 TIME A WK    DISABLED PLACARD (DISABLED PLACARD) DMV Handicap Placard, hx of OA, M15.9    therapeutic multivitamin (THEREMS) tablet Take 1 Tab by mouth daily.  Insulin Needles, Disposable, 31 gauge x 5/16\" ndle 1 each by Miscellaneous route daily.  glucosamine/chondr maradiaga A sod (GLUCOSAMINE-CHONDROITIN) 750-600 mg tab Take  by mouth two (2) times a day.  ascorbic acid, vitamin C, (VITAMIN C) 500 mg tablet Take 500 mg by mouth daily.    [address] (DISABLED PLACARD) DMV Supply prescription to [facility] with completed form RG-007A.  BIOTIN PO Take 5,000 mcg by mouth daily. Social History:  Social History     Tobacco Use    Smoking status: Former Smoker    Smokeless tobacco: Never Used   Substance Use Topics    Alcohol use: No       Pt denies any history of drug use, blood transfusions, or tattoos. Family History:  Family History   Problem Relation Age of Onset    Heart Disease Mother     Heart Disease Father        Review of Systems:  A detailed 10 system ROS is obtained, with pertinent positives as listed above. All others are negative. Diet:      Objective:     Physical Exam:  Vitals:  Visit Vitals  BP (!) 165/96   Pulse 85   Temp 98.4 °F (36.9 °C)   Resp 18   Ht 5' 8\" (1.727 m)   Wt 59 kg (130 lb)   SpO2 99%   BMI 19.77 kg/m²     Gen:  Pt is alert, cooperative, no acute distress. Skin:  Extremities and face reveal no rashes. HEENT: Sclerae anicteric. Extra-occular muscles are intact. No oral ulcers. No abnormal pigmentation of the lips. The neck is supple. Cardiovascular: Regular rate and rhythm. No murmurs, gallops, or rubs. Respiratory:  Comfortable breathing with no accessory muscle use. Clear breath sounds anteriorly with no wheezes, rales, or rhonchi. GI:  Abdomen nondistended, soft, and nontender. Normal active bowel sounds. No enlargement of the liver or spleen. No masses palpable. Rectal:  Deferred. Musculoskeletal:  No pitting edema of the lower legs. Neurological:  Gross memory appears intact. Patient is alert and oriented. Psychiatric:  Mood appears appropriate with judgement intact. Lymphatic:  No cervical or supraclavicular adenopathy.     Laboratory:    Recent Labs     12/24/21  0316   WBC 9.0   HGB 10.7*   HCT 33.8*      MCV 91.4      K 3.7      CO2 28   BUN 21   CREA 0.97   CA 9.1   *   *   AST 26   ALT 17   TBILI 0.7   ALB 3.4   TP 8.1   LPSE 23*   PTP 13.5   INR 1.0 Assessment:     Patient is a 78 y.o. female with PMH including but not limited to HTN, HLD, osteoporosis, GERD, anxiety, depression, and left breast cancer s/p bilateral mastectomy 2011 who is seen in consultation at the request of Dr. Claudene Flakes for esophageal foreign body impaction, likely pork chop ingested around 7pm last night. Plan:     - EGD for foreign body removal today  - I discussed the technique involved with the procedure as well as the risks, benefits, and alternatives including but not limited to bleeding, infection, perforation requiring emergent surgery, missing lesions, and anesthesia related complications with the patient, who voiced understanding and agrees to proceed      Shabbir Meehan M.D. [facility]

## 2021-12-24 NOTE — DISCHARGE INSTRUCTIONS
Pathology results in 3-5 business days and will prescribe medications if H. Pylori test is positive. Office visit in 3-4 weeks     GI soft diet, ground meat only, avoid NSAIDs    Abstain from alcohol    Omeprazole 40 mg take 30 minutes before breakfast       Soft-Textured, [place] Diet: Care Instructions  Your Care Instructions     A soft-textured, bland diet is used when you need food that is easy to chew, swallow, and digest. You will need to choose soft foods that are low in spices and seasonings. You will need to avoid high-fat foods, as well as caffeine and alcohol. Your doctor or dietitian can help you plan a soft-textured, bland diet based on your health and what you prefer to eat. Ask your doctor how long you should stay on this diet. As you get better, you will probably be able to go back to a regular diet. Talk with your doctor or dietitian before you make changes in your diet. Follow-up care is a key part of your treatment and safety. Be sure to make and go to all appointments, and call your doctor if you are having problems. It's also a good idea to know your test results and keep a list of the medicines you take. How can you care for yourself at home? · Choose foods that are easy to chew and swallow. Good choices are mashed potatoes, soft breads and rolls, cream soups, oatmeal, and Cream of Wheat. · Choose soft, well-cooked vegetables and soft or canned fruits. Good choices are applesauce, ripe bananas, and non-citrus fruit juice. · Try milk, yogurt, or other milk products, if you can digest dairy without too many problems. Your doctor may limit milk and milk products for a while. If so, he or she may recommend a calcium and vitamin D supplement. · Choose soft protein foods such as eggs, tofu, steamed fish, chicken, and turkey. Slow-cooking methods, such as stewing, will help soften meat. Chopping meat in a  or  also will make it easier to eat.   · Avoid nuts, raw vegetables, hard crackers, tough meats, and prunes and prune juice. · Avoid foods that are very spicy, such as foods seasoned with black pepper, chili peppers, horseradish, or hot sauce. · Avoid highly acidic foods such as citrus fruits, citrus fruit juices, and tomato-based foods. · Avoid high-fat foods such as fried meat, chips, and rich desserts. · Check with your doctor before you drink alcohol or beverages that have caffeine, such as coffee, tea, and cola beverages. Where can you learn more? Go to http://www.gray.com/  Enter G519 in the search box to learn more about \"Soft-Textured, Shelley Poor Diet: Care Instructions. \"  Current as of: December 17, 2020               Content Version: 13.0  © 6546-7861 [employer]. Care instructions adapted under license by [employer] (which disclaims liability or warranty for this information). If you have questions about a medical condition or this instruction, always ask your healthcare professional. [place] [address] any warranty or liability for your use of this information. ACTIVITY  · As tolerated and as directed by your doctor. · Bathe or shower as directed by your doctor. DIET  · Clear liquids until no nausea or vomiting; then light diet for the first day. · Advance to regular diet on second day, unless your doctor orders otherwise. · If nausea and vomiting continues, call your doctor. PAIN  · Take pain medication as directed by your doctor. · Call your doctor if pain is NOT relieved by medication. · DO NOT take aspirin of blood thinners unless directed by your doctor. MEDICATION INTERACTION:During your procedure you potentially received a medication or medications which may reduce the effectiveness of oral contraceptives. Please consider other forms of contraception for 1 month following your procedure if you are currently using oral contraceptives as your primary form of birth control. In addition to this, we recommend continuing your oral contraceptive as prescribed, unless otherwise instructed by your physician, during this time      [address] IF   · Excessive bleeding that does not stop after holding pressure over the area  · Temperature of 101 degrees F or above  · Excessive redness, swelling or bruising, and/ or green or yellow, smelly discharge from incision    After general anesthesia or intravenous sedation, for 24 hours or while taking prescription Narcotics:  · Limit your activities  · A responsible adult needs to be with you for the next 24 hours  · Do not drive and operate hazardous machinery  · Do not make important personal or business decisions  · Do not drink alcoholic beverages  · If you have not urinated within 8 hours after discharge, and you are experiencing discomfort from urinary retention, please go to the nearest ED. · If you have sleep apnea and have a CPAP machine, please use it for all naps and sleeping. · Please use caution when taking narcotics and any of your home medications that may cause drowsiness. *  Please give a list of your current medications to your Primary Care Provider. *  Please update this list whenever your medications are discontinued, doses are      changed, or new medications (including over-the-counter products) are added. *  Please carry medication information at all times in case of emergency situations. These are general instructions for a healthy lifestyle:  No smoking/ No tobacco products/ Avoid exposure to second hand smoke  Surgeon General's Warning:  Quitting smoking now greatly reduces serious risk to your health.   Obesity, smoking, and sedentary lifestyle greatly increases your risk for illness  A healthy diet, regular physical exercise & weight monitoring are important for maintaining a healthy lifestyle    You may be retaining fluid if you have a history of heart failure or if you experience any of the following symptoms: Weight gain of 3 pounds or more overnight or 5 pounds in a week, increased swelling in our hands or feet or shortness of breath while lying flat in bed. Please call your doctor as soon as you notice any of these symptoms; do not wait until your next office visit.

## 2021-12-24 NOTE — ED NOTES
Pt c/o eating porkchop and getting food stuck in her throat around 1900 tonight, handling secretions with no difficulty.   Given warm water to sip on

## 2021-12-24 NOTE — ANESTHESIA POSTPROCEDURE EVALUATION
Procedure(s):  ENDOSCOPIC FOREIGN BODY REMOVAL  ESOPHAGOGASTRODUODENOSCOPY (EGD)  ESOPHAGEAL DILATION  ESOPHAGOGASTRODUODENAL (EGD) BIOPSY  ENDOSCOPIC BANDING OR LIGATION.     general    Anesthesia Post Evaluation        Post anesthesia nausea and vomiting:  controlled      INITIAL Post-op Vital signs:   Vitals Value Taken Time   /77 12/24/21 0854   Temp 37.1 °C (98.7 °F) 12/24/21 0824   Pulse 85 12/24/21 0854   Resp 18 12/24/21 0854   SpO2 94 % 12/24/21 0854

## 2022-01-19 PROBLEM — R73.03 PREDIABETES: Status: ACTIVE | Noted: 2019-11-26

## 2022-02-04 ENCOUNTER — TELEPHONE (OUTPATIENT)
Dept: INTERNAL MEDICINE CLINIC | Age: 82
End: 2022-02-04

## 2022-02-04 NOTE — TELEPHONE ENCOUNTER
----- Message from Rhonda Solorzano sent at 2/3/2022 12:13 PM EST -----  Subject: Message to Provider    QUESTIONS  Information for Provider? [address] is requesting a verbal   order for a missed appt for PT. Please ask for Letha.  ---------------------------------------------------------------------------  --------------  CALL BACK INFO  What is the best way for the office to contact you? OK to leave message on   voicemail  Preferred Call Back Phone Number? [phone]  ---------------------------------------------------------------------------  --------------  SCRIPT ANSWERS  Relationship to Patient? Third Party  Representative Name?  [address] [place] [address]

## 2022-02-07 ENCOUNTER — HOSPITAL ENCOUNTER (EMERGENCY)
Age: 82
Discharge: HOME | End: 2022-02-07
Attending: EMERGENCY MEDICINE
Payer: MEDICARE

## 2022-02-07 ENCOUNTER — APPOINTMENT (OUTPATIENT)
Dept: GENERAL RADIOLOGY | Age: 82
End: 2022-02-07
Attending: PHYSICIAN ASSISTANT
Payer: MEDICARE

## 2022-02-07 VITALS
OXYGEN SATURATION: 98 % | TEMPERATURE: 98.6 F | RESPIRATION RATE: 16 BRPM | HEIGHT: 68 IN | DIASTOLIC BLOOD PRESSURE: 89 MMHG | SYSTOLIC BLOOD PRESSURE: 155 MMHG | HEART RATE: 79 BPM | BODY MASS INDEX: 19.7 KG/M2 | WEIGHT: 130 LBS

## 2022-02-07 DIAGNOSIS — T18.108A FOREIGN BODY IN ESOPHAGUS, INITIAL ENCOUNTER: Primary | ICD-10-CM

## 2022-02-07 PROCEDURE — 99283 EMERGENCY DEPT VISIT LOW MDM: CPT

## 2022-02-07 PROCEDURE — 96372 THER/PROPH/DIAG INJ SC/IM: CPT

## 2022-02-07 PROCEDURE — 74011250637 HC RX REV CODE- 250/637: Performed by: PHYSICIAN ASSISTANT

## 2022-02-07 PROCEDURE — 74011250636 HC RX REV CODE- 250/636: Performed by: PHYSICIAN ASSISTANT

## 2022-02-07 RX ORDER — ONDANSETRON 4 MG/1
4 TABLET, ORALLY DISINTEGRATING ORAL
Status: COMPLETED | OUTPATIENT
Start: 2022-02-07 | End: 2022-02-07

## 2022-02-07 RX ADMIN — GLUCAGON HYDROCHLORIDE 1 MG: KIT at 12:03

## 2022-02-07 RX ADMIN — ONDANSETRON 4 MG: 4 TABLET, ORALLY DISINTEGRATING ORAL at 12:10

## 2022-02-07 NOTE — DISCHARGE INSTRUCTIONS
I spoke with gastroenterology on call and they will arrange for follow-up in their office for you.   If you do not hear from them please call their office tomorrow to schedule a follow up

## 2022-02-07 NOTE — ED TRIAGE NOTES
Pt states she was eating a breakfast sandwich this morning and it is stuck in throat. States she has had trouble with this before and had to have it removed. States she is able to breath but not swallow liquids. Masked for triage.

## 2022-02-07 NOTE — ED PROVIDER NOTES
Patient is an 25-year-old female presenting to the emergency department today secondary to esophageal foreign body. The patient said that she was eating normally and then all of a sudden could not swallow. The patient notes this happened to her on Christmas eve 2021 and she had to have an EGD and dilation done at that time. She has not followed up with GI since then. Patient was given glucagon shortly after arrival to the emergency department and her esophageal foreign body was able to pass and she has been doing well since.   No other acute complaints           Past Medical History:   Diagnosis Date    Anxiety     Arthritis     Cancer ([address]     Depression     History of bone density study 08/2016    Dr. Ranjeet Woodson    Hypercholesterolemia     Hypertension     Onychomycosis     Osteoporosis     Thyroid disease        Past Surgical History:   Procedure Laterality Date    EGD  12/24/2021         HX CHOLECYSTECTOMY      HX COLONOSCOPY  2012    HX GYN      HX HEENT      HX ORTHOPAEDIC      HX TOTAL COLECTOMY  2003    [place] BREAST SURGERY PROCEDURE UNLISTED           Family History:   Problem Relation Age of Onset    Heart Disease Mother     Heart Disease Father        Social History     Socioeconomic History    Marital status:      Spouse name: Not on file    Number of children: 2    Years of education: Not on file    Highest education level: Not on file   Occupational History    Occupation: retired,    Tobacco Use    Smoking status: Former Smoker    Smokeless tobacco: Never Used   Substance and Sexual Activity    Alcohol use: No    Drug use: No    Sexual activity: Not on file   Other Topics Concern    Not on file   Social History Narrative    Not on file     Social Determinants of Health     Financial Resource Strain:     Difficulty of Paying Living Expenses: Not on file   Food Insecurity:     Worried About [address] in the Last Year: Not on file    Mervat childers Food in the Last Year: Not on file   Transportation Needs:     Lack of Transportation (Medical): Not on file    Lack of Transportation (Non-Medical): Not on file   Physical Activity:     Days of Exercise per Week: Not on file    Minutes of Exercise per Session: Not on file   Stress:     Feeling of Stress : Not on file   Social Connections:     Frequency of Communication with Friends and Family: Not on file    Frequency of Social Gatherings with Friends and Family: Not on file    Attends Ep[employer] Services: Not on file    Active Member of [address] [place] [address] [employer] or Organizations: Not on file    Attends Club or Organization Meetings: Not on file    Marital Status: Not on file   Intimate Partner Violence:     Fear of Current or Ex-Partner: Not on file    Emotionally Abused: Not on file    Physically Abused: Not on file    Sexually Abused: Not on file   Housing Stability:     Unable to Pay for Housing in the Last Year: Not on file    Number of [place] in the Last Year: Not on file    Unstable Housing in the Last Year: Not on file         ALLERGIES: Codeine, Iodine and iodide containing products, Penicillins, Squid, Adhesive, Ammonium lactate, Amoxicillin, Glutamine-c-quercet-selen-brom, Honey, Procaine, and Streptomycin    Review of Systems   HENT: Positive for trouble swallowing. Gastrointestinal:        Esophageal foreign body   All other systems reviewed and are negative. Vitals:    02/07/22 1149   BP: (!) 155/89   Pulse: 79   Resp: 16   Temp: 98.6 °F (37 °C)   SpO2: 98%   Weight: 59 kg (130 lb)   Height: 5' 8\" (1.727 m)            Physical Exam     GENERAL:The patient has Body mass index is 19.77 kg/m². Well-hydrated. VITAL SIGNS: Heart rate, blood pressure, respiratory rate reviewed as recorded in  nurse's notes  EYES: Pupils reactive. Extraocular motion intact. No conjunctival redness or drainage. MOUTH/THROAT: Pharynx clear; airway patent. NECK: Supple, no meningeal signs. Trachea midline.  No masses or thyromegaly. LUNGS: no accessory muscle use. CARDIOVASCULAR: Regular rate and rhythm  EXTREMITIES: No clubbing or cyanosis. No joint swelling. Normal muscle tone. No  restricted range of motion appreciated. NEUROLOGIC: Sensation is grossly intact. Cranial nerve exam reveals face is  symmetrical, tongue is midline speech is clear. SKIN: No rash or petechiae. Good skin turgor palpated. PSYCHIATRIC: Alert and oriented. Appropriate behavior and judgment. MDM  Number of Diagnoses or Management Options  Diagnosis management comments: Esophageal foreign body, esophageal rupture, nutcracker esophagus,       Amount and/or Complexity of Data Reviewed  Tests in the medicine section of CPT®: ordered and reviewed  Review and summarize past medical records: yes  Discuss the patient with other providers: yes      ED Course as of 02/07/22 1400 [facility] Feb 07, 2022   1333 I spoke with gastroenterology and they will arrange for outpatient follow-up with the patient.   Patient is doing well and she was instructed to take small bites and chew her food well to help prevent future esophageal food boluses [KH]      ED Course User Index  [KH] Sonia Younger,        Procedures

## 2022-02-07 NOTE — ED NOTES
I have reviewed discharge instructions with the patient. The patient verbalized understanding. Patient left ED via Discharge Method: ambulatory to Home with self    Opportunity for questions and clarification provided. Patient given 0 scripts. To continue your aftercare when you leave the hospital, you may receive an automated call from our care team to check in on how you are doing. This is a free service and part of our promise to provide the best care and service to meet your aftercare needs.  If you have questions, or wish to unsubscribe from this service please call [phone]. Thank you for Choosing our [address] [place] [address] Emergency Department.

## 2022-02-07 NOTE — ED NOTES
Patient here after trying to eat a breakfast croissant at 10 am, took two bites, it won't go down. No vomiting. H/o same with need for esophageal dilitation. Lungs CTA, no obvious foreign body in throat. Patient evaluated initially in triage. Rapid Medical Evaluation was conducted and necessary orders have been placed. I have performed a medical screening exam.  Care will now be transferred to the provider in the back of the emergency department.   AIDA Valentino 11:51 AM

## 2022-03-04 ENCOUNTER — NURSE TRIAGE (OUTPATIENT)
Dept: OTHER | Facility: CLINIC | Age: 82
End: 2022-03-04

## 2022-03-04 NOTE — TELEPHONE ENCOUNTER
Received callback request from David Mar triage RN    Complaint: HTN    Practice Name: Myra Alexis telephone number verified as [phone]    Unsuccessful attempt to re-connect with caller via phone, left message for return call to office     2 attempts to number verified by previous triage RN and via chart. LM on identified VM to return a call. A third call attempted to home number, unsuccessful.     Reason for Disposition   Message left on identified voice mail    Protocols used: NO CONTACT OR DUPLICATE CONTACT CALL-ADULT-

## 2022-03-19 PROBLEM — G89.4 CHRONIC PAIN DISORDER: Status: ACTIVE | Noted: 2018-05-10

## 2022-03-19 PROBLEM — R73.03 PREDIABETES: Status: ACTIVE | Noted: 2019-11-26

## 2022-03-19 PROBLEM — M97.32XA PERIPROSTHETIC FRACTURE AROUND INTERNAL PROSTHETIC LEFT SHOULDER JOINT: Status: ACTIVE | Noted: 2020-10-09

## 2022-03-19 PROBLEM — J30.1 SEASONAL ALLERGIC RHINITIS DUE TO POLLEN: Status: ACTIVE | Noted: 2017-06-19

## 2022-03-19 PROBLEM — Z87.81 HISTORY OF FRACTURE OF LEFT HIP: Status: ACTIVE | Noted: 2018-11-19

## 2022-03-20 PROBLEM — M19.012: Status: ACTIVE | Noted: 2017-10-31

## 2022-03-20 PROBLEM — E55.9 VITAMIN D INSUFFICIENCY: Status: ACTIVE | Noted: 2018-07-09

## 2022-06-03 DIAGNOSIS — I10 ESSENTIAL HYPERTENSION: Primary | ICD-10-CM

## 2022-06-03 RX ORDER — LISINOPRIL 2.5 MG/1
2.5 TABLET ORAL DAILY
Qty: 30 TABLET | Refills: 0 | Status: SHIPPED | OUTPATIENT
Start: 2022-06-03 | End: 2022-07-19 | Stop reason: SDUPTHER

## 2022-06-03 RX ORDER — LISINOPRIL 2.5 MG/1
1 TABLET ORAL DAILY
COMMUNITY
Start: 2022-05-09 | End: 2022-06-03 | Stop reason: SDUPTHER

## 2022-06-10 ENCOUNTER — TELEPHONE (OUTPATIENT)
Dept: INTERNAL MEDICINE CLINIC | Facility: CLINIC | Age: 82
End: 2022-06-10

## 2022-06-10 DIAGNOSIS — R73.03 PREDIABETES: ICD-10-CM

## 2022-06-10 DIAGNOSIS — E03.4 ACQUIRED ATROPHY OF THYROID: Primary | ICD-10-CM

## 2022-06-10 DIAGNOSIS — D51.0 PERNICIOUS ANEMIA: ICD-10-CM

## 2022-06-10 DIAGNOSIS — E55.9 VITAMIN D INSUFFICIENCY: ICD-10-CM

## 2022-06-10 DIAGNOSIS — E78.2 COMBINED HYPERLIPIDEMIA: ICD-10-CM

## 2022-06-15 ENCOUNTER — NURSE ONLY (OUTPATIENT)
Dept: INTERNAL MEDICINE CLINIC | Facility: CLINIC | Age: 82
End: 2022-06-15

## 2022-06-15 DIAGNOSIS — E78.2 COMBINED HYPERLIPIDEMIA: ICD-10-CM

## 2022-06-15 DIAGNOSIS — E55.9 VITAMIN D INSUFFICIENCY: ICD-10-CM

## 2022-06-15 DIAGNOSIS — D51.0 PERNICIOUS ANEMIA: ICD-10-CM

## 2022-06-15 DIAGNOSIS — E03.4 ACQUIRED ATROPHY OF THYROID: ICD-10-CM

## 2022-06-15 DIAGNOSIS — R73.03 PREDIABETES: ICD-10-CM

## 2022-06-16 LAB
25(OH)D3 SERPL-MCNC: 63 NG/ML (ref 30–100)
ALBUMIN SERPL-MCNC: 3.7 G/DL (ref 3.2–4.6)
ALBUMIN/GLOB SERPL: 1.1 {RATIO} (ref 1.2–3.5)
ALP SERPL-CCNC: 90 U/L (ref 50–136)
ALT SERPL-CCNC: 27 U/L (ref 12–65)
ANION GAP SERPL CALC-SCNC: 10 MMOL/L (ref 7–16)
APPEARANCE UR: NORMAL
AST SERPL-CCNC: 35 U/L (ref 15–37)
BASOPHILS # BLD: 0 K/UL (ref 0–0.2)
BASOPHILS NFR BLD: 1 % (ref 0–2)
BILIRUB SERPL-MCNC: 0.8 MG/DL (ref 0.2–1.1)
BILIRUB UR QL: NEGATIVE
BUN SERPL-MCNC: 27 MG/DL (ref 8–23)
CALCIUM SERPL-MCNC: 9.5 MG/DL (ref 8.3–10.4)
CHLORIDE SERPL-SCNC: 103 MMOL/L (ref 98–107)
CHOLEST SERPL-MCNC: 186 MG/DL
CO2 SERPL-SCNC: 24 MMOL/L (ref 21–32)
COLOR UR: NORMAL
CREAT SERPL-MCNC: 1.1 MG/DL (ref 0.6–1)
DIFFERENTIAL METHOD BLD: ABNORMAL
EOSINOPHIL # BLD: 0.3 K/UL (ref 0–0.8)
EOSINOPHIL NFR BLD: 6 % (ref 0.5–7.8)
ERYTHROCYTE [DISTWIDTH] IN BLOOD BY AUTOMATED COUNT: 16.9 % (ref 11.9–14.6)
EST. AVERAGE GLUCOSE BLD GHB EST-MCNC: 97 MG/DL
GLOBULIN SER CALC-MCNC: 3.5 G/DL (ref 2.3–3.5)
GLUCOSE SERPL-MCNC: 98 MG/DL (ref 65–100)
GLUCOSE UR STRIP.AUTO-MCNC: NEGATIVE MG/DL
HBA1C MFR BLD: 5 % (ref 4.2–6.3)
HCT VFR BLD AUTO: 35.6 % (ref 35.8–46.3)
HDLC SERPL-MCNC: 104 MG/DL (ref 40–60)
HDLC SERPL: 1.8 {RATIO}
HGB BLD-MCNC: 11 G/DL (ref 11.7–15.4)
HGB UR QL STRIP: NEGATIVE
IMM GRANULOCYTES # BLD AUTO: 0 K/UL (ref 0–0.5)
IMM GRANULOCYTES NFR BLD AUTO: 0 % (ref 0–5)
KETONES UR QL STRIP.AUTO: NEGATIVE MG/DL
LDLC SERPL CALC-MCNC: 66.6 MG/DL
LEUKOCYTE ESTERASE UR QL STRIP.AUTO: NEGATIVE
LYMPHOCYTES # BLD: 1.5 K/UL (ref 0.5–4.6)
LYMPHOCYTES NFR BLD: 30 % (ref 13–44)
MCH RBC QN AUTO: 29.2 PG (ref 26.1–32.9)
MCHC RBC AUTO-ENTMCNC: 30.9 G/DL (ref 31.4–35)
MCV RBC AUTO: 94.4 FL (ref 79.6–97.8)
MONOCYTES # BLD: 0.7 K/UL (ref 0.1–1.3)
MONOCYTES NFR BLD: 13 % (ref 4–12)
NEUTS SEG # BLD: 2.5 K/UL (ref 1.7–8.2)
NEUTS SEG NFR BLD: 50 % (ref 43–78)
NITRITE UR QL STRIP.AUTO: NEGATIVE
NRBC # BLD: 0 K/UL (ref 0–0.2)
PH UR STRIP: 5 [PH] (ref 5–9)
PLATELET # BLD AUTO: 265 K/UL (ref 150–450)
PMV BLD AUTO: 9.6 FL (ref 9.4–12.3)
POTASSIUM SERPL-SCNC: 4.6 MMOL/L (ref 3.5–5.1)
PROT SERPL-MCNC: 7.2 G/DL (ref 6.3–8.2)
PROT UR STRIP-MCNC: NEGATIVE MG/DL
RBC # BLD AUTO: 3.77 M/UL (ref 4.05–5.2)
SODIUM SERPL-SCNC: 137 MMOL/L (ref 136–145)
SP GR UR REFRACTOMETRY: 1.01 (ref 1–1.02)
T4 FREE SERPL-MCNC: 1.3 NG/DL (ref 0.78–1.46)
TRIGL SERPL-MCNC: 77 MG/DL (ref 35–150)
TSH, 3RD GENERATION: 0.44 UIU/ML (ref 0.36–3.74)
UROBILINOGEN UR QL STRIP.AUTO: 0.2 EU/DL (ref 0.2–1)
VLDLC SERPL CALC-MCNC: 15.4 MG/DL (ref 6–23)
WBC # BLD AUTO: 5 K/UL (ref 4.3–11.1)

## 2022-07-19 DIAGNOSIS — F33.1 MODERATE EPISODE OF RECURRENT MAJOR DEPRESSIVE DISORDER (HCC): ICD-10-CM

## 2022-07-19 DIAGNOSIS — I10 ESSENTIAL HYPERTENSION: ICD-10-CM

## 2022-07-19 DIAGNOSIS — F41.1 GENERALIZED ANXIETY DISORDER: Primary | ICD-10-CM

## 2022-07-19 RX ORDER — DULOXETIN HYDROCHLORIDE 60 MG/1
60 CAPSULE, DELAYED RELEASE ORAL 2 TIMES DAILY
Qty: 60 CAPSULE | Refills: 0 | Status: SHIPPED | OUTPATIENT
Start: 2022-07-19 | End: 2022-07-25 | Stop reason: SDUPTHER

## 2022-07-19 RX ORDER — LISINOPRIL 2.5 MG/1
2.5 TABLET ORAL DAILY
Qty: 30 TABLET | Refills: 0 | Status: SHIPPED | OUTPATIENT
Start: 2022-07-19 | End: 2022-07-25 | Stop reason: SDUPTHER

## 2022-07-19 NOTE — TELEPHONE ENCOUNTER
----- Message from Jd Butler sent at 7/18/2022  3:00 PM EDT -----  Subject: Refill Request    QUESTIONS  Name of Medication? lisinopril (PRINIVIL;ZESTRIL) 2.5 MG tablet  Patient-reported dosage and instructions? Take 1 tablet by mouth daily  How many days do you have left? 0  Preferred Pharmacy? [id number]  Pharmacy phone number (if available)? [phone]  ---------------------------------------------------------------------------  --------------,  Name of Medication? DULoxetine (CYMBALTA) 60 MG extended release capsule  Patient-reported dosage and instructions? Take 1 capsule (60 mg) by mouth   2 (two) times a day. How many days do you have left? 0  Preferred Pharmacy? [id number]  Pharmacy phone number (if available)? [phone]  ---------------------------------------------------------------------------  --------------,  Name of Medication? metoprolol tartrate (LOPRESSOR) 25 MG tablet  Patient-reported dosage and instructions? Take 1 tablet by mouth 2 times   daily  How many days do you have left? 0  Preferred Pharmacy? [facility] # [id number]  Pharmacy phone number (if available)? [phone]  Additional Information for Provider? Pt also mentioned needing her   Amlodipine and Vitamin D refilled but hung up before I could confirm  ---------------------------------------------------------------------------  --------------  CALL BACK INFO  What is the best way for the office to contact you? Do not leave any   message, patient will call back for answer  Preferred Call Back Phone Number? [phone]  ---------------------------------------------------------------------------  --------------  SCRIPT ANSWERS  Relationship to Patient?  Self

## 2022-07-25 ENCOUNTER — OFFICE VISIT (OUTPATIENT)
Dept: INTERNAL MEDICINE CLINIC | Facility: CLINIC | Age: 82
End: 2022-07-25
Payer: MEDICARE

## 2022-07-25 VITALS
RESPIRATION RATE: 16 BRPM | BODY MASS INDEX: 19.37 KG/M2 | HEIGHT: 68 IN | WEIGHT: 127.8 LBS | TEMPERATURE: 98.3 F | OXYGEN SATURATION: 98 % | HEART RATE: 67 BPM | DIASTOLIC BLOOD PRESSURE: 57 MMHG | SYSTOLIC BLOOD PRESSURE: 77 MMHG

## 2022-07-25 DIAGNOSIS — F41.3 OTHER MIXED ANXIETY DISORDERS: ICD-10-CM

## 2022-07-25 DIAGNOSIS — E78.2 COMBINED HYPERLIPIDEMIA: ICD-10-CM

## 2022-07-25 DIAGNOSIS — E03.4 ACQUIRED ATROPHY OF THYROID: ICD-10-CM

## 2022-07-25 DIAGNOSIS — R73.03 PREDIABETES: ICD-10-CM

## 2022-07-25 DIAGNOSIS — I10 ESSENTIAL HYPERTENSION: Primary | ICD-10-CM

## 2022-07-25 DIAGNOSIS — E55.9 VITAMIN D INSUFFICIENCY: ICD-10-CM

## 2022-07-25 DIAGNOSIS — F41.1 GENERALIZED ANXIETY DISORDER: ICD-10-CM

## 2022-07-25 DIAGNOSIS — G47.33 OBSTRUCTIVE SLEEP APNEA SYNDROME: ICD-10-CM

## 2022-07-25 DIAGNOSIS — K21.9 GASTROESOPHAGEAL REFLUX DISEASE WITHOUT ESOPHAGITIS: ICD-10-CM

## 2022-07-25 DIAGNOSIS — F33.1 MODERATE EPISODE OF RECURRENT MAJOR DEPRESSIVE DISORDER (HCC): ICD-10-CM

## 2022-07-25 DIAGNOSIS — M81.6 LOCALIZED OSTEOPOROSIS WITHOUT CURRENT PATHOLOGICAL FRACTURE: ICD-10-CM

## 2022-07-25 PROCEDURE — 1123F ACP DISCUSS/DSCN MKR DOCD: CPT | Performed by: INTERNAL MEDICINE

## 2022-07-25 PROCEDURE — 99214 OFFICE O/P EST MOD 30 MIN: CPT | Performed by: INTERNAL MEDICINE

## 2022-07-25 RX ORDER — LISINOPRIL 2.5 MG/1
2.5 TABLET ORAL DAILY
Qty: 90 TABLET | Refills: 1 | Status: SHIPPED | OUTPATIENT
Start: 2022-07-25

## 2022-07-25 RX ORDER — ATORVASTATIN CALCIUM 40 MG/1
40 TABLET, FILM COATED ORAL DAILY
Qty: 90 TABLET | Refills: 1 | Status: SHIPPED | OUTPATIENT
Start: 2022-07-25

## 2022-07-25 RX ORDER — DULOXETIN HYDROCHLORIDE 60 MG/1
60 CAPSULE, DELAYED RELEASE ORAL 2 TIMES DAILY
Qty: 180 CAPSULE | Refills: 1 | Status: SHIPPED | OUTPATIENT
Start: 2022-07-25

## 2022-07-25 RX ORDER — ASPIRIN 81 MG/1
81 TABLET ORAL DAILY
COMMUNITY
Start: 2021-11-30 | End: 2022-11-30

## 2022-07-25 RX ORDER — OMEPRAZOLE 20 MG/1
20 CAPSULE, DELAYED RELEASE ORAL DAILY
Qty: 90 CAPSULE | Refills: 1 | Status: SHIPPED | OUTPATIENT
Start: 2022-07-25

## 2022-07-25 RX ORDER — LEVOTHYROXINE SODIUM 137 UG/1
137 TABLET ORAL
COMMUNITY
Start: 2022-05-04 | End: 2022-07-25 | Stop reason: SDUPTHER

## 2022-07-25 RX ORDER — HYDROCODONE BITARTRATE AND ACETAMINOPHEN 7.5; 325 MG/1; MG/1
1 TABLET ORAL 2 TIMES DAILY
COMMUNITY
Start: 2022-05-31

## 2022-07-25 RX ORDER — BUSPIRONE HYDROCHLORIDE 10 MG/1
10 TABLET ORAL 3 TIMES DAILY
Qty: 270 TABLET | Refills: 1 | Status: SHIPPED | OUTPATIENT
Start: 2022-07-25

## 2022-07-25 RX ORDER — OMEPRAZOLE 20 MG/1
1 CAPSULE, DELAYED RELEASE ORAL DAILY
COMMUNITY
Start: 2022-02-16 | End: 2022-07-25 | Stop reason: SDUPTHER

## 2022-07-25 RX ORDER — LEVOTHYROXINE SODIUM 137 UG/1
137 TABLET ORAL
Qty: 90 TABLET | Refills: 1 | Status: SHIPPED | OUTPATIENT
Start: 2022-07-25 | End: 2022-10-23

## 2022-07-25 RX ORDER — NITROGLYCERIN 0.4 MG/1
0.4 TABLET SUBLINGUAL EVERY 5 MIN PRN
COMMUNITY
Start: 2022-05-08

## 2022-07-25 RX ORDER — AMLODIPINE BESYLATE 5 MG/1
1 TABLET ORAL DAILY
COMMUNITY
Start: 2022-07-20 | End: 2022-07-25

## 2022-07-25 RX ORDER — CLONAZEPAM 0.5 MG/1
TABLET ORAL
COMMUNITY
Start: 2022-05-04

## 2022-07-25 ASSESSMENT — ENCOUNTER SYMPTOMS
DIARRHEA: 0
SHORTNESS OF BREATH: 0
RHINORRHEA: 0
VOMITING: 0
BLOOD IN STOOL: 0
NAUSEA: 0
CHEST TIGHTNESS: 0
ABDOMINAL PAIN: 0
SINUS PRESSURE: 0
COUGH: 0

## 2022-07-25 ASSESSMENT — PATIENT HEALTH QUESTIONNAIRE - PHQ9
1. LITTLE INTEREST OR PLEASURE IN DOING THINGS: 3
6. FEELING BAD ABOUT YOURSELF - OR THAT YOU ARE A FAILURE OR HAVE LET YOURSELF OR YOUR FAMILY DOWN: 2
SUM OF ALL RESPONSES TO PHQ QUESTIONS 1-9: 13
9. THOUGHTS THAT YOU WOULD BE BETTER OFF DEAD, OR OF HURTING YOURSELF: 2
10. IF YOU CHECKED OFF ANY PROBLEMS, HOW DIFFICULT HAVE THESE PROBLEMS MADE IT FOR YOU TO DO YOUR WORK, TAKE CARE OF THINGS AT HOME, OR GET ALONG WITH OTHER PEOPLE: 1
7. TROUBLE CONCENTRATING ON THINGS, SUCH AS READING THE NEWSPAPER OR WATCHING TELEVISION: 0
SUM OF ALL RESPONSES TO PHQ9 QUESTIONS 1 & 2: 5
5. POOR APPETITE OR OVEREATING: 0
8. MOVING OR SPEAKING SO SLOWLY THAT OTHER PEOPLE COULD HAVE NOTICED. OR THE OPPOSITE, BEING SO FIGETY OR RESTLESS THAT YOU HAVE BEEN MOVING AROUND A LOT MORE THAN USUAL: 0
SUM OF ALL RESPONSES TO PHQ QUESTIONS 1-9: 11
SUM OF ALL RESPONSES TO PHQ QUESTIONS 1-9: 13
SUM OF ALL RESPONSES TO PHQ QUESTIONS 1-9: 13
3. TROUBLE FALLING OR STAYING ASLEEP: 2
2. FEELING DOWN, DEPRESSED OR HOPELESS: 2
4. FEELING TIRED OR HAVING LITTLE ENERGY: 2

## 2022-07-25 ASSESSMENT — COLUMBIA-SUICIDE SEVERITY RATING SCALE - C-SSRS
1. WITHIN THE PAST MONTH, HAVE YOU WISHED YOU WERE DEAD OR WISHED YOU COULD GO TO SLEEP AND NOT WAKE UP?: NO
6. HAVE YOU EVER DONE ANYTHING, STARTED TO DO ANYTHING, OR PREPARED TO DO ANYTHING TO END YOUR LIFE?: NO
2. HAVE YOU ACTUALLY HAD ANY THOUGHTS OF KILLING YOURSELF?: NO

## 2022-07-25 NOTE — PROGRESS NOTES
[facility] Primary [facility]      2022    Patient Name: Marques Resendez  :  1940    Subjective:    Chief Complaint:  Chief Complaint   Patient presents with    Hypertension     Pt is here to review labs. HPI Here for f/u visit; patient had fasting labs done recently and results were reviewed in detail today; She follows a healthy diet, avoids fried foods and sweets in her diet; She has CHACHO, has not been able to tolerate CPAP;  She has depression and anxiety, taking medications as prescribed and feeling well;   HTN:  Patient compliant with taking blood pressure medications: Yes  Discussed importance of following low sodium DASH diet, increasing physical activity, taking medications as ordered, decreasing alcohol intake, keeping f/u visits to recheck blood pressure, monitoring blood pressure at home and keeping a log, with goal blood pressure <140/90. Home bp readings are: high      Past Medical History:   Diagnosis Date    Anxiety     Arthritis     Cancer ([facility] [address]     Depression     History of bone density study 2016    Dr. Haider Garvin    Hypercholesterolemia     Hypertension     Onychomycosis     Osteoporosis     Thyroid disease        Past Surgical History:   Procedure Laterality Date    BREAST SURGERY      CHOLECYSTECTOMY      COLONOSCOPY      GYN      HEENT      ORTHOPEDIC SURGERY      TOTAL COLECTOMY  2003    UPPER GASTROINTESTINAL ENDOSCOPY  2021            Family History   Problem Relation Age of Onset    Heart Disease Mother     Heart Disease Father        Social History     Tobacco Use    Smoking status: Former    Smokeless tobacco: Never   Substance Use Topics    Alcohol use: Yes     Comment: Sometimes    Drug use:  No                 Current Outpatient Medications:     aspirin 81 MG EC tablet, Take 81 mg by mouth in the morning., Disp: , Rfl:     clonazePAM (KLONOPIN) 0.5 MG tablet, Take 1 Tablet by mouth daily as needed, Disp: , Rfl:     HYDROcodone-acetaminophen ([facility]) 7.5-325 MG per tablet, Take 1 tablet by mouth in the morning and 1 tablet before bedtime. , Disp: , Rfl:     nitroGLYCERIN (NITROSTAT) 0.4 MG SL tablet, Place 0.4 mg under the tongue every 5 minutes as needed, Disp: , Rfl:     DULoxetine (CYMBALTA) 60 MG extended release capsule, Take 1 capsule by mouth in the morning and 1 capsule before bedtime. Take 1 capsule (60 mg) by mouth 2 (two) times a day. ., Disp: 180 capsule, Rfl: 1    lisinopril (PRINIVIL;ZESTRIL) 2.5 MG tablet, Take 1 tablet by mouth in the morning., Disp: 90 tablet, Rfl: 1    metoprolol tartrate (LOPRESSOR) 25 MG tablet, Take 1 tablet by mouth in the morning and 1 tablet before bedtime. , Disp: 180 tablet, Rfl: 1    atorvastatin (LIPITOR) 40 MG tablet, Take 1 tablet by mouth in the morning., Disp: 90 tablet, Rfl: 1    busPIRone (BUSPAR) 10 MG tablet, Take 1 tablet by mouth in the morning and 1 tablet at noon and 1 tablet before bedtime. , Disp: 270 tablet, Rfl: 1    levothyroxine (SYNTHROID) 137 MCG tablet, Take 1 tablet by mouth every morning (before breakfast), Disp: 90 tablet, Rfl: 1    omeprazole (PRILOSEC) 20 MG delayed release capsule, Take 1 capsule by mouth in the morning., Disp: 90 capsule, Rfl: 1    BIOTIN PO, Take 5,000 mcg by mouth daily, Disp: , Rfl:     ascorbic acid (VITAMIN C) 500 MG tablet, Take 500 mg by mouth daily, Disp: , Rfl:     buprenorphine (BUPRENEX) 15 MCG/HR PTWK, Place 1 patch onto the skin., Disp: , Rfl:     cyanocobalamin 1000 MCG/ML injection, Inject 1 mL as directed every 14 (fourteen) days. , Disp: , Rfl:     ergocalciferol (ERGOCALCIFEROL) 1.25 MG (97456 UT) capsule, TK 1 C PO 1 TIME A WK, Disp: , Rfl:     glucosamine-chondroitin 750-600 MG TABS tablet, Take by mouth 2 times daily, Disp: , Rfl:     levoFLOXacin (LEVAQUIN) 500 MG tablet, Take 500 mg by mouth daily, Disp: , Rfl:     naloxone 4 MG/0.1ML LIQD nasal spray, 4 mg, Disp: , Rfl:     ondansetron (ZOFRAN-ODT) 8 MG TBDP disintegrating tablet, Take 8 mg by mouth every 8 hours as needed, Disp: , Rfl:     teriparatide (FORTEO) 620 MCG/2.48ML SOPN injection, Inject 0.08 mL (20 mcg) under the skin daily, Disp: , Rfl:     triamcinolone (KENALOG) 0.025 % cream, Apply 2.5 each topically 2 times daily, Disp: , Rfl:     Allergies   Allergen Reactions    Codeine Anaphylaxis    Lidocaine-Menthol Rash    Penicillins Anaphylaxis, Other (See Comments) and Swelling     Swelling internal and external      Ammonium Lactate Other (See Comments)     Topical burn    Honey Hives    Procaine Other (See Comments)     Severe mouth sores    Streptomycin Hives and Swelling       Review of Systems   Constitutional:  Negative for chills, fatigue and fever. HENT:  Negative for congestion, postnasal drip, rhinorrhea and sinus pressure. Eyes:  Negative for visual disturbance. Respiratory:  Negative for cough, chest tightness and shortness of breath. Cardiovascular:  Negative for chest pain and palpitations. Gastrointestinal:  Negative for abdominal pain, blood in stool, diarrhea, nausea and vomiting. Genitourinary:  Negative for dysuria, frequency and urgency. Musculoskeletal:  Negative for arthralgias and myalgias. Skin: Negative. Neurological:  Negative for numbness and headaches. Psychiatric/Behavioral:  Negative for dysphoric mood and sleep disturbance. The patient is not nervous/anxious. Objective:  BP (!) 77/57   Pulse 67   Temp 98.3 °F (36.8 °C) (Temporal)   Resp 16   Ht 5' 8\" (1.727 m)   Wt 127 lb 12.8 oz (58 kg)   SpO2 98%   BMI 19.43 kg/m²     Examination:  Physical Exam  Constitutional:       Appearance: Normal appearance. HENT:      Head: Normocephalic and atraumatic. Right Ear: Tympanic membrane and ear canal normal.      Left Ear: Tympanic membrane and ear canal normal.      Nose: Nose normal.      Mouth/Throat:      Mouth: Mucous membranes are moist.   Eyes:      Extraocular Movements: Extraocular movements intact.       Conjunctiva/sclera: Conjunctivae normal.      Pupils: Pupils are equal, round, and reactive to light. Cardiovascular:      Rate and Rhythm: Normal rate and regular rhythm. Pulses: Normal pulses. Heart sounds: Normal heart sounds. Pulmonary:      Effort: Pulmonary effort is normal.      Breath sounds: Normal breath sounds. Abdominal:      General: Abdomen is flat. Bowel sounds are normal.      Palpations: Abdomen is soft. Musculoskeletal:      Cervical back: Normal range of motion and neck supple. Skin:     General: Skin is warm and dry. Neurological:      General: No focal deficit present. Mental Status: She is alert. Mental status is at baseline. Psychiatric:         Mood and Affect: Mood normal.         Thought Content: Thought content normal.         Assessment/Plan:    Mervat Umaña was seen today for hypertension. Diagnoses and all orders for this visit:    Essential hypertension  Stable, continue medication, diet. -     lisinopril (PRINIVIL;ZESTRIL) 2.5 MG tablet; Take 1 tablet by mouth in the morning.  -     metoprolol tartrate (LOPRESSOR) 25 MG tablet; Take 1 tablet by mouth in the morning and 1 tablet before bedtime.  -     Urinalysis; Future    Obstructive sleep apnea syndrome  She has not been able to tolerate CPAP in the past;     Acquired atrophy of thyroid  Stable on present medications, continue as prescribed    -     levothyroxine (SYNTHROID) 137 MCG tablet; Take 1 tablet by mouth every morning (before breakfast)  -     T4, Free; Future  -     TSH; Future    Vitamin D insufficiency  Stable. Prediabetes  -     Hemoglobin A1C; Future    Moderate episode of recurrent major depressive disorder (HCC)  Stable on present medications, continue as prescribed    -     DULoxetine (CYMBALTA) 60 MG extended release capsule; Take 1 capsule by mouth in the morning and 1 capsule before bedtime. Take 1 capsule (60 mg) by mouth 2 (two) times a day. .    Combined hyperlipidemia  Stable, continue medication, [address]  [phone]  [phone] fax  5:12 PM

## 2022-12-21 NOTE — PROGRESS NOTES
Spiritual Care initial visit made by [employer] ([place]). Prayer and support given. Ryan anguiano. CALIXTO Harris  / Bereavement Coordinator Detail Level: Detailed

## 2023-03-29 DIAGNOSIS — E03.4 ACQUIRED ATROPHY OF THYROID: ICD-10-CM

## 2023-03-29 DIAGNOSIS — I10 ESSENTIAL HYPERTENSION: ICD-10-CM

## 2023-03-31 RX ORDER — LISINOPRIL 2.5 MG/1
2.5 TABLET ORAL DAILY
Qty: 90 TABLET | Refills: 1 | Status: SHIPPED | OUTPATIENT
Start: 2023-03-31

## 2023-03-31 RX ORDER — LEVOTHYROXINE SODIUM 137 UG/1
137 TABLET ORAL
Qty: 90 TABLET | Refills: 1 | Status: SHIPPED | OUTPATIENT
Start: 2023-03-31 | End: 2023-06-29

## 2023-04-27 ENCOUNTER — TELEPHONE (OUTPATIENT)
Dept: INTERNAL MEDICINE CLINIC | Facility: CLINIC | Age: 83
End: 2023-04-27

## 2023-04-27 DIAGNOSIS — I10 ESSENTIAL HYPERTENSION: ICD-10-CM

## 2023-04-27 DIAGNOSIS — K21.9 GASTROESOPHAGEAL REFLUX DISEASE WITHOUT ESOPHAGITIS: ICD-10-CM

## 2023-04-28 DIAGNOSIS — I10 ESSENTIAL HYPERTENSION: ICD-10-CM

## 2023-04-29 DIAGNOSIS — F41.1 GENERALIZED ANXIETY DISORDER: ICD-10-CM

## 2023-04-29 DIAGNOSIS — F33.1 MODERATE EPISODE OF RECURRENT MAJOR DEPRESSIVE DISORDER (HCC): ICD-10-CM

## 2023-05-01 RX ORDER — DULOXETIN HYDROCHLORIDE 60 MG/1
CAPSULE, DELAYED RELEASE ORAL
Qty: 180 CAPSULE | Refills: 0 | OUTPATIENT
Start: 2023-05-01

## 2023-05-02 RX ORDER — OMEPRAZOLE 20 MG/1
20 CAPSULE, DELAYED RELEASE ORAL DAILY
Qty: 90 CAPSULE | Refills: 1 | Status: SHIPPED | OUTPATIENT
Start: 2023-05-02

## 2023-05-27 DIAGNOSIS — F33.1 MODERATE EPISODE OF RECURRENT MAJOR DEPRESSIVE DISORDER (HCC): ICD-10-CM

## 2023-05-27 DIAGNOSIS — F41.1 GENERALIZED ANXIETY DISORDER: ICD-10-CM

## 2023-05-27 DIAGNOSIS — E78.2 COMBINED HYPERLIPIDEMIA: ICD-10-CM

## 2023-05-30 RX ORDER — ATORVASTATIN CALCIUM 40 MG/1
40 TABLET, FILM COATED ORAL DAILY
Qty: 90 TABLET | Refills: 1 | Status: SHIPPED | OUTPATIENT
Start: 2023-05-30

## 2023-05-30 RX ORDER — DULOXETIN HYDROCHLORIDE 60 MG/1
60 CAPSULE, DELAYED RELEASE ORAL 2 TIMES DAILY
Qty: 180 CAPSULE | Refills: 1 | Status: SHIPPED | OUTPATIENT
Start: 2023-05-30

## 2023-06-26 RX ORDER — HYDROCODONE BITARTRATE AND ACETAMINOPHEN 7.5; 325 MG/1; MG/1
1 TABLET ORAL 2 TIMES DAILY
Qty: 60 TABLET | Refills: 0 | Status: CANCELLED | OUTPATIENT
Start: 2023-06-26

## 2023-07-21 DIAGNOSIS — R11.0 NAUSEA: Primary | ICD-10-CM

## 2023-07-21 RX ORDER — ONDANSETRON 8 MG/1
8 TABLET, ORALLY DISINTEGRATING ORAL EVERY 8 HOURS PRN
Qty: 30 TABLET | Refills: 1 | Status: SHIPPED | OUTPATIENT
Start: 2023-07-21

## 2023-08-28 ENCOUNTER — TELEPHONE (OUTPATIENT)
Dept: INTERNAL MEDICINE CLINIC | Facility: CLINIC | Age: 83
End: 2023-08-28

## 2023-09-01 ENCOUNTER — TELEPHONE (OUTPATIENT)
Dept: INTERNAL MEDICINE CLINIC | Facility: CLINIC | Age: 83
End: 2023-09-01

## 2023-09-01 NOTE — TELEPHONE ENCOUNTER
DEVINM informing pt that I was calling to see how she's doing, schedule a hospital follow up and to go over some BRITTANY questions. Asked pt to call the office to do this.

## 2023-09-14 DIAGNOSIS — R73.03 PREDIABETES: ICD-10-CM

## 2023-09-14 DIAGNOSIS — E78.2 COMBINED HYPERLIPIDEMIA: ICD-10-CM

## 2023-09-14 DIAGNOSIS — I10 ESSENTIAL HYPERTENSION: Primary | ICD-10-CM

## 2023-09-27 ENCOUNTER — TELEMEDICINE (OUTPATIENT)
Dept: INTERNAL MEDICINE CLINIC | Facility: CLINIC | Age: 83
End: 2023-09-27
Payer: MEDICARE

## 2023-09-27 ENCOUNTER — TELEPHONE (OUTPATIENT)
Dept: INTERNAL MEDICINE CLINIC | Facility: CLINIC | Age: 83
End: 2023-09-27

## 2023-09-27 DIAGNOSIS — R53.81 DEBILITY: ICD-10-CM

## 2023-09-27 DIAGNOSIS — Z09 HOSPITAL DISCHARGE FOLLOW-UP: Primary | ICD-10-CM

## 2023-09-27 PROCEDURE — 99442 PR PHYS/QHP TELEPHONE EVALUATION 11-20 MIN: CPT | Performed by: INTERNAL MEDICINE

## 2023-09-27 NOTE — PROGRESS NOTES
[facility] Primary Care      2023    Patient Name: Eligah Libman  :  1940    Subjective:    Chief Complaint:  Chief Complaint   Patient presents with    Follow-up         HPI I was in the office while conducting this encounter. Consent:  She and/or her healthcare decision maker is aware that this patient-initiated Telehealth encounter is a billable service, with coverage as determined by her insurance carrier. She is aware that she may receive a bill and has provided verbal consent to proceed: Yes    This virtual visit was conducted telephone encounter only. -  I affirm this is a Patient Initiated Episode with an Established Patient who has not had a related appointment within my department in the past 7 days or scheduled within the next 24 hours. Note: this encounter is not billable if this call serves to triage the patient into an appointment for the relevant concern. Total Time: minutes: 11-20 minutes. Patient evaluated for f/u; \"has been going downhill for a long time\", according to her ; she was discharged from rehab 23 and went to ED at [facility] that day for R leg swelling and abrasion of R foot; US was negative for DVT, xray negative for fracture, blood work reassuring; Jb Vazquez referral was ordered; records reviewed;  states she is unable to get out of bed on her own, has lost interest in eating or drinking;  states he is not able to lift her and help her as much; he states he has not heard from [facility] agency; he did have aid that came from [facility] agency that helped to clean her and get her dressed;      Past Medical History:   Diagnosis Date    Anxiety     Arthritis     Cancer ([address])     Depression     History of bone density study 2016    Dr. Simeon Mckinney    Hypercholesterolemia     Hypertension     Onychomycosis     Osteoporosis     Thyroid disease        Past Surgical History:   Procedure Laterality Date    BREAST SURGERY

## 2023-09-28 ENCOUNTER — TELEPHONE (OUTPATIENT)
Dept: INTERNAL MEDICINE CLINIC | Facility: CLINIC | Age: 83
End: 2023-09-28

## 2023-09-28 NOTE — TELEPHONE ENCOUNTER
Pt's  is calling the office to request a call back regarding the pt.  states he has a couple of unanswered questions from their virtual visit on 9/27.

## 2023-09-29 ENCOUNTER — TELEMEDICINE (OUTPATIENT)
Dept: INTERNAL MEDICINE CLINIC | Facility: CLINIC | Age: 83
End: 2023-09-29
Payer: MEDICARE

## 2023-09-29 DIAGNOSIS — R53.81 DEBILITY: Primary | ICD-10-CM

## 2023-09-29 DIAGNOSIS — R62.7 FAILURE TO THRIVE IN ADULT: ICD-10-CM

## 2023-09-29 PROCEDURE — 1123F ACP DISCUSS/DSCN MKR DOCD: CPT | Performed by: INTERNAL MEDICINE

## 2023-09-29 PROCEDURE — 99213 OFFICE O/P EST LOW 20 MIN: CPT | Performed by: INTERNAL MEDICINE

## 2023-09-29 ASSESSMENT — ENCOUNTER SYMPTOMS: RESPIRATORY NEGATIVE: 1

## 2023-09-29 NOTE — PROGRESS NOTES
SURGERY      TOTAL COLECTOMY  2003    UPPER GASTROINTESTINAL ENDOSCOPY  12/24/2021            Family History   Problem Relation Age of Onset    Heart Disease Mother     Heart Disease Father        Social History     Tobacco Use    Smoking status: Former    Smokeless tobacco: Never   Substance Use Topics    Alcohol use: Yes     Comment: Sometimes    Drug use: No                 Current Outpatient Medications:     ondansetron (ZOFRAN-ODT) 8 MG TBDP disintegrating tablet, Take 1 tablet by mouth every 8 hours as needed for Nausea, Disp: 30 tablet, Rfl: 1    DULoxetine (CYMBALTA) 60 MG extended release capsule, Take 1 capsule by mouth 2 times daily Take 1 capsule (60 mg) by mouth 2 (two) times a day., Disp: 180 capsule, Rfl: 1    atorvastatin (LIPITOR) 40 MG tablet, Take 1 tablet by mouth daily, Disp: 90 tablet, Rfl: 1    omeprazole (PRILOSEC) 20 MG delayed release capsule, Take 1 capsule by mouth daily, Disp: 90 capsule, Rfl: 1    metoprolol tartrate (LOPRESSOR) 25 MG tablet, Take 1 tablet by mouth 2 times daily, Disp: 180 tablet, Rfl: 1    lisinopril (PRINIVIL;ZESTRIL) 2.5 MG tablet, Take 1 tablet by mouth daily, Disp: 90 tablet, Rfl: 1    levothyroxine (SYNTHROID) 137 MCG tablet, Take 1 tablet by mouth every morning (before breakfast), Disp: 90 tablet, Rfl: 1    aspirin 81 MG EC tablet, Take 81 mg by mouth in the morning., Disp: , Rfl:     clonazePAM (KLONOPIN) 0.5 MG tablet, Take 1 Tablet by mouth daily as needed, Disp: , Rfl:     HYDROcodone-acetaminophen (NORCO) 7.5-325 MG per tablet, Take 1 tablet by mouth in the morning and 1 tablet before bedtime. , Disp: , Rfl:     nitroGLYCERIN (NITROSTAT) 0.4 MG SL tablet, Place 0.4 mg under the tongue every 5 minutes as needed, Disp: , Rfl:     busPIRone (BUSPAR) 10 MG tablet, Take 1 tablet by mouth in the morning and 1 tablet at noon and 1 tablet before bedtime. , Disp: 270 tablet, Rfl: 1    BIOTIN PO, Take 5,000 mcg by mouth daily, Disp: , Rfl:     ascorbic acid (VITAMIN C)

## 2023-09-29 NOTE — TELEPHONE ENCOUNTER
LMOVM of pt's spouse informing him that pt should keep the appt for today @ 4:40 and Dr. Franko Nielson can answer any questions he may have.

## 2023-09-30 ENCOUNTER — APPOINTMENT (OUTPATIENT)
Dept: GENERAL RADIOLOGY | Age: 83
DRG: 308 | End: 2023-09-30
Payer: MEDICARE

## 2023-09-30 ENCOUNTER — APPOINTMENT (OUTPATIENT)
Dept: CT IMAGING | Age: 83
DRG: 308 | End: 2023-09-30
Payer: MEDICARE

## 2023-09-30 ENCOUNTER — APPOINTMENT (OUTPATIENT)
Dept: NON INVASIVE DIAGNOSTICS | Age: 83
DRG: 308 | End: 2023-09-30
Attending: INTERNAL MEDICINE
Payer: MEDICARE

## 2023-09-30 ENCOUNTER — HOSPITAL ENCOUNTER (INPATIENT)
Age: 83
LOS: 4 days | DRG: 308 | End: 2023-10-04
Attending: EMERGENCY MEDICINE | Admitting: INTERNAL MEDICINE
Payer: MEDICARE

## 2023-09-30 DIAGNOSIS — I95.9 HYPOTENSION, UNSPECIFIED HYPOTENSION TYPE: ICD-10-CM

## 2023-09-30 DIAGNOSIS — E03.9 HYPOTHYROIDISM, UNSPECIFIED TYPE: ICD-10-CM

## 2023-09-30 DIAGNOSIS — I95.9 ACUTE HYPOTENSION: ICD-10-CM

## 2023-09-30 DIAGNOSIS — R79.89 ELEVATED TROPONIN I LEVEL: ICD-10-CM

## 2023-09-30 DIAGNOSIS — I48.91 ATRIAL FIBRILLATION WITH RVR (HCC): Primary | ICD-10-CM

## 2023-09-30 PROBLEM — R62.7 FAILURE TO THRIVE IN ADULT: Status: ACTIVE | Noted: 2023-09-30

## 2023-09-30 PROBLEM — R34 DECREASED URINATION: Status: ACTIVE | Noted: 2023-09-30

## 2023-09-30 PROBLEM — R54 FRAILTY: Status: ACTIVE | Noted: 2023-09-30

## 2023-09-30 LAB
ALBUMIN SERPL-MCNC: 1.7 G/DL (ref 3.2–4.6)
ALBUMIN/GLOB SERPL: 0.5 (ref 0.4–1.6)
ALP SERPL-CCNC: 102 U/L (ref 50–136)
ALT SERPL-CCNC: 15 U/L (ref 12–65)
ANION GAP SERPL CALC-SCNC: 11 MMOL/L (ref 2–11)
APPEARANCE UR: ABNORMAL
APTT PPP: 32.9 SEC (ref 24.5–34.2)
AST SERPL-CCNC: 40 U/L (ref 15–37)
BACTERIA URNS QL MICRO: ABNORMAL /HPF
BASOPHILS # BLD: 0 K/UL (ref 0–0.2)
BASOPHILS NFR BLD: 1 % (ref 0–2)
BILIRUB SERPL-MCNC: 0.5 MG/DL (ref 0.2–1.1)
BILIRUB UR QL: ABNORMAL
BUN SERPL-MCNC: 19 MG/DL (ref 8–23)
CALCIUM SERPL-MCNC: 8.1 MG/DL (ref 8.3–10.4)
CASTS URNS QL MICRO: ABNORMAL /LPF
CHLORIDE SERPL-SCNC: 111 MMOL/L (ref 101–110)
CHP ED QC CHECK: NORMAL
CO2 SERPL-SCNC: 21 MMOL/L (ref 21–32)
COLOR UR: ABNORMAL
CREAT SERPL-MCNC: 1 MG/DL (ref 0.6–1)
CRYSTALS URNS QL MICRO: ABNORMAL /LPF
DIFFERENTIAL METHOD BLD: ABNORMAL
ECHO AO ASC DIAM: 3.3 CM
ECHO AO ASCENDING AORTA INDEX: 1.79 CM/M2
ECHO AO ROOT DIAM: 2.9 CM
ECHO AO ROOT INDEX: 1.58 CM/M2
ECHO AV AREA PEAK VELOCITY: 1.2 CM2
ECHO AV AREA VTI: 1.3 CM2
ECHO AV AREA/BSA PEAK VELOCITY: 0.7 CM2/M2
ECHO AV AREA/BSA VTI: 0.7 CM2/M2
ECHO AV MEAN GRADIENT: 3 MMHG
ECHO AV MEAN VELOCITY: 0.8 M/S
ECHO AV PEAK GRADIENT: 6 MMHG
ECHO AV PEAK VELOCITY: 1.2 M/S
ECHO AV VELOCITY RATIO: 0.5
ECHO AV VTI: 15.2 CM
ECHO BSA: 1.84 M2
ECHO EST RA PRESSURE: 3 MMHG
ECHO IVC PROX: 1.2 CM
ECHO LA AREA 4C: 11.6 CM2
ECHO LA DIAMETER INDEX: 1.47 CM/M2
ECHO LA DIAMETER: 2.7 CM
ECHO LA MAJOR AXIS: 3.8 CM
ECHO LA TO AORTIC ROOT RATIO: 0.93
ECHO LA VOL 4C: 24 ML (ref 22–52)
ECHO LA VOLUME INDEX A4C: 13 ML/M2 (ref 16–34)
ECHO LV FRACTIONAL SHORTENING: 37 % (ref 28–44)
ECHO LV INTERNAL DIMENSION DIASTOLE INDEX: 1.9 CM/M2
ECHO LV INTERNAL DIMENSION DIASTOLIC: 3.5 CM (ref 3.9–5.3)
ECHO LV INTERNAL DIMENSION SYSTOLIC INDEX: 1.2 CM/M2
ECHO LV INTERNAL DIMENSION SYSTOLIC: 2.2 CM
ECHO LV IVSD: 1.3 CM (ref 0.6–0.9)
ECHO LV MASS 2D: 103.4 G (ref 67–162)
ECHO LV MASS INDEX 2D: 56.2 G/M2 (ref 43–95)
ECHO LV POSTERIOR WALL DIASTOLIC: 0.7 CM (ref 0.6–0.9)
ECHO LV RELATIVE WALL THICKNESS RATIO: 0.4
ECHO LVOT AREA: 2.5 CM2
ECHO LVOT AV VTI INDEX: 0.52
ECHO LVOT DIAM: 1.8 CM
ECHO LVOT MEAN GRADIENT: 1 MMHG
ECHO LVOT PEAK GRADIENT: 1 MMHG
ECHO LVOT PEAK VELOCITY: 0.6 M/S
ECHO LVOT STROKE VOLUME INDEX: 10.9 ML/M2
ECHO LVOT SV: 20.1 ML
ECHO LVOT VTI: 7.9 CM
ECHO MV E DECELERATION TIME (DT): 202 MS
ECHO MV E VELOCITY: 0.49 M/S
ECHO PV MAX VELOCITY: 0.7 M/S
ECHO PV PEAK GRADIENT: 2 MMHG
ECHO RIGHT VENTRICULAR SYSTOLIC PRESSURE (RVSP): 34 MMHG
ECHO TV REGURGITANT MAX VELOCITY: 2.79 M/S
ECHO TV REGURGITANT PEAK GRADIENT: 31 MMHG
EOSINOPHIL # BLD: 0.2 K/UL (ref 0–0.8)
EOSINOPHIL NFR BLD: 2 % (ref 0.5–7.8)
EPI CELLS #/AREA URNS HPF: ABNORMAL /HPF
ERYTHROCYTE [DISTWIDTH] IN BLOOD BY AUTOMATED COUNT: 14.6 % (ref 11.9–14.6)
ERYTHROCYTE [DISTWIDTH] IN BLOOD BY AUTOMATED COUNT: 14.7 % (ref 11.9–14.6)
GLOBULIN SER CALC-MCNC: 3.2 G/DL (ref 2.8–4.5)
GLUCOSE BLD STRIP.AUTO-MCNC: 82 MG/DL (ref 65–100)
GLUCOSE BLD-MCNC: 82 MG/DL
GLUCOSE SERPL-MCNC: 100 MG/DL (ref 65–100)
GLUCOSE UR STRIP.AUTO-MCNC: NEGATIVE MG/DL
HCT VFR BLD AUTO: 34.5 % (ref 35.8–46.3)
HCT VFR BLD AUTO: 34.5 % (ref 35.8–46.3)
HGB BLD-MCNC: 11 G/DL (ref 11.7–15.4)
HGB BLD-MCNC: 11 G/DL (ref 11.7–15.4)
HGB UR QL STRIP: ABNORMAL
IMM GRANULOCYTES # BLD AUTO: 0.1 K/UL (ref 0–0.5)
IMM GRANULOCYTES NFR BLD AUTO: 1 % (ref 0–5)
INR PPP: 1.1
KETONES UR QL STRIP.AUTO: 15 MG/DL
LACTATE SERPL-SCNC: 1.8 MMOL/L (ref 0.4–2)
LACTATE SERPL-SCNC: 2 MMOL/L (ref 0.4–2)
LACTATE SERPL-SCNC: 3.2 MMOL/L (ref 0.4–2)
LACTATE SERPL-SCNC: 3.5 MMOL/L (ref 0.4–2)
LEUKOCYTE ESTERASE UR QL STRIP.AUTO: ABNORMAL
LYMPHOCYTES # BLD: 1.7 K/UL (ref 0.5–4.6)
LYMPHOCYTES NFR BLD: 25 % (ref 13–44)
MAGNESIUM SERPL-MCNC: 1.7 MG/DL (ref 1.8–2.4)
MCH RBC QN AUTO: 32.1 PG (ref 26.1–32.9)
MCH RBC QN AUTO: 32.2 PG (ref 26.1–32.9)
MCHC RBC AUTO-ENTMCNC: 31.9 G/DL (ref 31.4–35)
MCHC RBC AUTO-ENTMCNC: 31.9 G/DL (ref 31.4–35)
MCV RBC AUTO: 100.6 FL (ref 82–102)
MCV RBC AUTO: 100.9 FL (ref 82–102)
MONOCYTES # BLD: 0.5 K/UL (ref 0.1–1.3)
MONOCYTES NFR BLD: 8 % (ref 4–12)
NEUTS SEG # BLD: 4.3 K/UL (ref 1.7–8.2)
NEUTS SEG NFR BLD: 63 % (ref 43–78)
NITRITE UR QL STRIP.AUTO: POSITIVE
NRBC # BLD: 0 K/UL (ref 0–0.2)
NRBC # BLD: 0 K/UL (ref 0–0.2)
NT PRO BNP: 7707 PG/ML
OTHER OBSERVATIONS: ABNORMAL
PH UR STRIP: 5 (ref 5–9)
PLATELET # BLD AUTO: 235 K/UL (ref 150–450)
PLATELET # BLD AUTO: 236 K/UL (ref 150–450)
PMV BLD AUTO: 9.6 FL (ref 9.4–12.3)
PMV BLD AUTO: 9.7 FL (ref 9.4–12.3)
POTASSIUM SERPL-SCNC: 4.2 MMOL/L (ref 3.5–5.1)
PROCALCITONIN SERPL-MCNC: 0.12 NG/ML (ref 0–0.49)
PROT SERPL-MCNC: 4.9 G/DL (ref 6.3–8.2)
PROT UR STRIP-MCNC: 30 MG/DL
PROTHROMBIN TIME: 14.1 SEC (ref 12.6–14.3)
RBC # BLD AUTO: 3.42 M/UL (ref 4.05–5.2)
RBC # BLD AUTO: 3.43 M/UL (ref 4.05–5.2)
RBC #/AREA URNS HPF: ABNORMAL /HPF
SERVICE CMNT-IMP: NORMAL
SODIUM SERPL-SCNC: 143 MMOL/L (ref 133–143)
SP GR UR REFRACTOMETRY: 1.02 (ref 1–1.02)
T4 FREE SERPL-MCNC: 0.7 NG/DL (ref 0.78–1.46)
TROPONIN I SERPL HS-MCNC: 1095.3 PG/ML (ref 0–14)
TROPONIN I SERPL HS-MCNC: 4162.5 PG/ML (ref 0–14)
TSH W FREE THYROID IF ABNORMAL: 3.68 UIU/ML (ref 0.36–3.74)
UFH PPP CHRO-ACNC: <0.1 IU/ML (ref 0.3–0.7)
UFH PPP CHRO-ACNC: >1.1 IU/ML (ref 0.3–0.7)
UROBILINOGEN UR QL STRIP.AUTO: 1 EU/DL (ref 0.2–1)
WBC # BLD AUTO: 6.9 K/UL (ref 4.3–11.1)
WBC # BLD AUTO: 7.8 K/UL (ref 4.3–11.1)
WBC URNS QL MICRO: ABNORMAL /HPF

## 2023-09-30 PROCEDURE — 6360000004 HC RX CONTRAST MEDICATION: Performed by: EMERGENCY MEDICINE

## 2023-09-30 PROCEDURE — 82962 GLUCOSE BLOOD TEST: CPT

## 2023-09-30 PROCEDURE — 2500000003 HC RX 250 WO HCPCS

## 2023-09-30 PROCEDURE — 6360000002 HC RX W HCPCS: Performed by: INTERNAL MEDICINE

## 2023-09-30 PROCEDURE — 83880 ASSAY OF NATRIURETIC PEPTIDE: CPT

## 2023-09-30 PROCEDURE — 71045 X-RAY EXAM CHEST 1 VIEW: CPT

## 2023-09-30 PROCEDURE — 85027 COMPLETE CBC AUTOMATED: CPT

## 2023-09-30 PROCEDURE — 96376 TX/PRO/DX INJ SAME DRUG ADON: CPT

## 2023-09-30 PROCEDURE — 99223 1ST HOSP IP/OBS HIGH 75: CPT | Performed by: INTERNAL MEDICINE

## 2023-09-30 PROCEDURE — 84145 PROCALCITONIN (PCT): CPT

## 2023-09-30 PROCEDURE — 85730 THROMBOPLASTIN TIME PARTIAL: CPT

## 2023-09-30 PROCEDURE — 71260 CT THORAX DX C+: CPT

## 2023-09-30 PROCEDURE — 02HV33Z INSERTION OF INFUSION DEVICE INTO SUPERIOR VENA CAVA, PERCUTANEOUS APPROACH: ICD-10-PCS | Performed by: EMERGENCY MEDICINE

## 2023-09-30 PROCEDURE — 96366 THER/PROPH/DIAG IV INF ADDON: CPT

## 2023-09-30 PROCEDURE — 81001 URINALYSIS AUTO W/SCOPE: CPT

## 2023-09-30 PROCEDURE — 93306 TTE W/DOPPLER COMPLETE: CPT | Performed by: INTERNAL MEDICINE

## 2023-09-30 PROCEDURE — 36592 COLLECT BLOOD FROM PICC: CPT

## 2023-09-30 PROCEDURE — 6360000004 HC RX CONTRAST MEDICATION: Performed by: INTERNAL MEDICINE

## 2023-09-30 PROCEDURE — 2580000003 HC RX 258

## 2023-09-30 PROCEDURE — 6360000002 HC RX W HCPCS: Performed by: EMERGENCY MEDICINE

## 2023-09-30 PROCEDURE — 87040 BLOOD CULTURE FOR BACTERIA: CPT

## 2023-09-30 PROCEDURE — 96367 TX/PROPH/DG ADDL SEQ IV INF: CPT

## 2023-09-30 PROCEDURE — 96365 THER/PROPH/DIAG IV INF INIT: CPT

## 2023-09-30 PROCEDURE — 94761 N-INVAS EAR/PLS OXIMETRY MLT: CPT

## 2023-09-30 PROCEDURE — 85025 COMPLETE CBC W/AUTO DIFF WBC: CPT

## 2023-09-30 PROCEDURE — 85610 PROTHROMBIN TIME: CPT

## 2023-09-30 PROCEDURE — 51798 US URINE CAPACITY MEASURE: CPT

## 2023-09-30 PROCEDURE — 6360000002 HC RX W HCPCS

## 2023-09-30 PROCEDURE — 2500000003 HC RX 250 WO HCPCS: Performed by: EMERGENCY MEDICINE

## 2023-09-30 PROCEDURE — 2580000003 HC RX 258: Performed by: EMERGENCY MEDICINE

## 2023-09-30 PROCEDURE — 36556 INSERT NON-TUNNEL CV CATH: CPT

## 2023-09-30 PROCEDURE — 84443 ASSAY THYROID STIM HORMONE: CPT

## 2023-09-30 PROCEDURE — C8929 TTE W OR WO FOL WCON,DOPPLER: HCPCS

## 2023-09-30 PROCEDURE — 80053 COMPREHEN METABOLIC PANEL: CPT

## 2023-09-30 PROCEDURE — 85520 HEPARIN ASSAY: CPT

## 2023-09-30 PROCEDURE — 2580000003 HC RX 258: Performed by: INTERNAL MEDICINE

## 2023-09-30 PROCEDURE — 83735 ASSAY OF MAGNESIUM: CPT

## 2023-09-30 PROCEDURE — 84439 ASSAY OF FREE THYROXINE: CPT

## 2023-09-30 PROCEDURE — 99285 EMERGENCY DEPT VISIT HI MDM: CPT

## 2023-09-30 PROCEDURE — 3E033XZ INTRODUCTION OF VASOPRESSOR INTO PERIPHERAL VEIN, PERCUTANEOUS APPROACH: ICD-10-PCS | Performed by: EMERGENCY MEDICINE

## 2023-09-30 PROCEDURE — 96375 TX/PRO/DX INJ NEW DRUG ADDON: CPT

## 2023-09-30 PROCEDURE — 83605 ASSAY OF LACTIC ACID: CPT

## 2023-09-30 PROCEDURE — 87205 SMEAR GRAM STAIN: CPT

## 2023-09-30 PROCEDURE — 2500000003 HC RX 250 WO HCPCS: Performed by: INTERNAL MEDICINE

## 2023-09-30 PROCEDURE — 96368 THER/DIAG CONCURRENT INF: CPT

## 2023-09-30 PROCEDURE — 84484 ASSAY OF TROPONIN QUANT: CPT

## 2023-09-30 PROCEDURE — 2100000000 HC CCU R&B

## 2023-09-30 RX ORDER — SODIUM CHLORIDE 9 MG/ML
INJECTION, SOLUTION INTRAVENOUS PRN
Status: DISCONTINUED | OUTPATIENT
Start: 2023-09-30 | End: 2023-10-04 | Stop reason: HOSPADM

## 2023-09-30 RX ORDER — NALOXONE HYDROCHLORIDE 1 MG/ML
1 INJECTION INTRAMUSCULAR; INTRAVENOUS; SUBCUTANEOUS
Status: COMPLETED | OUTPATIENT
Start: 2023-09-30 | End: 2023-09-30

## 2023-09-30 RX ORDER — HEPARIN SODIUM 1000 [USP'U]/ML
2000 INJECTION, SOLUTION INTRAVENOUS; SUBCUTANEOUS PRN
Status: DISCONTINUED | OUTPATIENT
Start: 2023-09-30 | End: 2023-10-01

## 2023-09-30 RX ORDER — HEPARIN SODIUM 1000 [USP'U]/ML
4000 INJECTION, SOLUTION INTRAVENOUS; SUBCUTANEOUS PRN
Status: DISCONTINUED | OUTPATIENT
Start: 2023-09-30 | End: 2023-10-01

## 2023-09-30 RX ORDER — HEPARIN SODIUM 10000 [USP'U]/100ML
5-30 INJECTION, SOLUTION INTRAVENOUS CONTINUOUS
Status: CANCELLED | OUTPATIENT
Start: 2023-09-30

## 2023-09-30 RX ORDER — ONDANSETRON 2 MG/ML
4 INJECTION INTRAMUSCULAR; INTRAVENOUS EVERY 6 HOURS PRN
Status: DISCONTINUED | OUTPATIENT
Start: 2023-09-30 | End: 2023-10-01

## 2023-09-30 RX ORDER — SODIUM CHLORIDE, SODIUM LACTATE, POTASSIUM CHLORIDE, CALCIUM CHLORIDE 600; 310; 30; 20 MG/100ML; MG/100ML; MG/100ML; MG/100ML
INJECTION, SOLUTION INTRAVENOUS CONTINUOUS
Status: DISCONTINUED | OUTPATIENT
Start: 2023-09-30 | End: 2023-09-30

## 2023-09-30 RX ORDER — HEPARIN SODIUM 10000 [USP'U]/100ML
5-30 INJECTION, SOLUTION INTRAVENOUS CONTINUOUS
Status: DISCONTINUED | OUTPATIENT
Start: 2023-09-30 | End: 2023-09-30

## 2023-09-30 RX ORDER — SODIUM CHLORIDE 0.9 % (FLUSH) 0.9 %
5-40 SYRINGE (ML) INJECTION PRN
Status: DISCONTINUED | OUTPATIENT
Start: 2023-09-30 | End: 2023-10-04 | Stop reason: HOSPADM

## 2023-09-30 RX ORDER — HEPARIN SODIUM 1000 [USP'U]/ML
60 INJECTION, SOLUTION INTRAVENOUS; SUBCUTANEOUS ONCE
Status: DISCONTINUED | OUTPATIENT
Start: 2023-09-30 | End: 2023-09-30

## 2023-09-30 RX ORDER — DILTIAZEM HYDROCHLORIDE 5 MG/ML
10 INJECTION INTRAVENOUS
Status: COMPLETED | OUTPATIENT
Start: 2023-09-30 | End: 2023-09-30

## 2023-09-30 RX ORDER — ACETAMINOPHEN 650 MG/1
650 SUPPOSITORY RECTAL EVERY 6 HOURS PRN
Status: DISCONTINUED | OUTPATIENT
Start: 2023-09-30 | End: 2023-10-04 | Stop reason: HOSPADM

## 2023-09-30 RX ORDER — HEPARIN SODIUM 1000 [USP'U]/ML
60 INJECTION, SOLUTION INTRAVENOUS; SUBCUTANEOUS ONCE
Status: CANCELLED | OUTPATIENT
Start: 2023-09-30 | End: 2023-09-30

## 2023-09-30 RX ORDER — NALOXONE HYDROCHLORIDE 0.4 MG/ML
0.4 INJECTION, SOLUTION INTRAMUSCULAR; INTRAVENOUS; SUBCUTANEOUS
Status: DISCONTINUED | OUTPATIENT
Start: 2023-09-30 | End: 2023-09-30

## 2023-09-30 RX ORDER — POLYETHYLENE GLYCOL 3350 17 G/17G
17 POWDER, FOR SOLUTION ORAL DAILY PRN
Status: DISCONTINUED | OUTPATIENT
Start: 2023-09-30 | End: 2023-10-01

## 2023-09-30 RX ORDER — MAGNESIUM SULFATE IN WATER 40 MG/ML
2000 INJECTION, SOLUTION INTRAVENOUS ONCE
Status: COMPLETED | OUTPATIENT
Start: 2023-09-30 | End: 2023-09-30

## 2023-09-30 RX ORDER — LEVOFLOXACIN 5 MG/ML
750 INJECTION, SOLUTION INTRAVENOUS ONCE
Status: COMPLETED | OUTPATIENT
Start: 2023-09-30 | End: 2023-09-30

## 2023-09-30 RX ORDER — SODIUM CHLORIDE, SODIUM LACTATE, POTASSIUM CHLORIDE, AND CALCIUM CHLORIDE .6; .31; .03; .02 G/100ML; G/100ML; G/100ML; G/100ML
1000 INJECTION, SOLUTION INTRAVENOUS
Status: COMPLETED | OUTPATIENT
Start: 2023-09-30 | End: 2023-09-30

## 2023-09-30 RX ORDER — HEPARIN SODIUM 1000 [USP'U]/ML
30 INJECTION, SOLUTION INTRAVENOUS; SUBCUTANEOUS PRN
Status: DISCONTINUED | OUTPATIENT
Start: 2023-09-30 | End: 2023-09-30

## 2023-09-30 RX ORDER — ENOXAPARIN SODIUM 100 MG/ML
40 INJECTION SUBCUTANEOUS DAILY
Status: DISCONTINUED | OUTPATIENT
Start: 2023-09-30 | End: 2023-09-30 | Stop reason: SDUPTHER

## 2023-09-30 RX ORDER — HEPARIN SODIUM 10000 [USP'U]/100ML
5-30 INJECTION, SOLUTION INTRAVENOUS CONTINUOUS
Status: DISCONTINUED | OUTPATIENT
Start: 2023-09-30 | End: 2023-10-01

## 2023-09-30 RX ORDER — HEPARIN SODIUM 1000 [USP'U]/ML
30 INJECTION, SOLUTION INTRAVENOUS; SUBCUTANEOUS PRN
Status: CANCELLED | OUTPATIENT
Start: 2023-09-30

## 2023-09-30 RX ORDER — ONDANSETRON 4 MG/1
4 TABLET, ORALLY DISINTEGRATING ORAL EVERY 8 HOURS PRN
Status: DISCONTINUED | OUTPATIENT
Start: 2023-09-30 | End: 2023-10-01

## 2023-09-30 RX ORDER — 0.9 % SODIUM CHLORIDE 0.9 %
1000 INTRAVENOUS SOLUTION INTRAVENOUS
Status: COMPLETED | OUTPATIENT
Start: 2023-09-30 | End: 2023-09-30

## 2023-09-30 RX ORDER — NOREPINEPHRINE BITARTRATE 0.02 MG/ML
1-100 INJECTION, SOLUTION INTRAVENOUS CONTINUOUS
Status: DISCONTINUED | OUTPATIENT
Start: 2023-09-30 | End: 2023-10-01

## 2023-09-30 RX ORDER — SODIUM CHLORIDE 0.9 % (FLUSH) 0.9 %
5-40 SYRINGE (ML) INJECTION EVERY 12 HOURS SCHEDULED
Status: DISCONTINUED | OUTPATIENT
Start: 2023-09-30 | End: 2023-10-01

## 2023-09-30 RX ORDER — CALCIUM GLUCONATE 94 MG/ML
2000 INJECTION, SOLUTION INTRAVENOUS
Status: COMPLETED | OUTPATIENT
Start: 2023-09-30 | End: 2023-09-30

## 2023-09-30 RX ORDER — DILTIAZEM HYDROCHLORIDE 5 MG/ML
20 INJECTION INTRAVENOUS
Status: COMPLETED | OUTPATIENT
Start: 2023-09-30 | End: 2023-09-30

## 2023-09-30 RX ORDER — ACETAMINOPHEN 325 MG/1
650 TABLET ORAL EVERY 6 HOURS PRN
Status: DISCONTINUED | OUTPATIENT
Start: 2023-09-30 | End: 2023-10-04 | Stop reason: HOSPADM

## 2023-09-30 RX ORDER — HEPARIN SODIUM 1000 [USP'U]/ML
60 INJECTION, SOLUTION INTRAVENOUS; SUBCUTANEOUS PRN
Status: DISCONTINUED | OUTPATIENT
Start: 2023-09-30 | End: 2023-09-30

## 2023-09-30 RX ORDER — KETOROLAC TROMETHAMINE 30 MG/ML
15 INJECTION, SOLUTION INTRAMUSCULAR; INTRAVENOUS EVERY 6 HOURS PRN
Status: DISCONTINUED | OUTPATIENT
Start: 2023-09-30 | End: 2023-10-01

## 2023-09-30 RX ORDER — HEPARIN SODIUM 1000 [USP'U]/ML
4000 INJECTION, SOLUTION INTRAVENOUS; SUBCUTANEOUS ONCE
Status: COMPLETED | OUTPATIENT
Start: 2023-09-30 | End: 2023-09-30

## 2023-09-30 RX ORDER — ONDANSETRON 2 MG/ML
4 INJECTION INTRAMUSCULAR; INTRAVENOUS
Status: COMPLETED | OUTPATIENT
Start: 2023-09-30 | End: 2023-09-30

## 2023-09-30 RX ORDER — HEPARIN SODIUM 1000 [USP'U]/ML
60 INJECTION, SOLUTION INTRAVENOUS; SUBCUTANEOUS PRN
Status: CANCELLED | OUTPATIENT
Start: 2023-09-30

## 2023-09-30 RX ADMIN — SODIUM CHLORIDE, POTASSIUM CHLORIDE, SODIUM LACTATE AND CALCIUM CHLORIDE 1000 ML: 600; 310; 30; 20 INJECTION, SOLUTION INTRAVENOUS at 06:21

## 2023-09-30 RX ADMIN — SODIUM CHLORIDE, POTASSIUM CHLORIDE, SODIUM LACTATE AND CALCIUM CHLORIDE 1000 ML: 600; 310; 30; 20 INJECTION, SOLUTION INTRAVENOUS at 06:35

## 2023-09-30 RX ADMIN — VANCOMYCIN HYDROCHLORIDE 1750 MG: 10 INJECTION, POWDER, LYOPHILIZED, FOR SOLUTION INTRAVENOUS at 13:03

## 2023-09-30 RX ADMIN — NOREPINEPHRINE BITARTRATE 5 MCG/MIN: 1 INJECTION, SOLUTION, CONCENTRATE INTRAVENOUS at 11:38

## 2023-09-30 RX ADMIN — SODIUM CHLORIDE, PRESERVATIVE FREE 10 ML: 5 INJECTION INTRAVENOUS at 21:04

## 2023-09-30 RX ADMIN — SODIUM CHLORIDE 1000 ML: 9 INJECTION, SOLUTION INTRAVENOUS at 08:20

## 2023-09-30 RX ADMIN — NALOXONE HYDROCHLORIDE 1 MG: 1 INJECTION INTRAMUSCULAR; INTRAVENOUS; SUBCUTANEOUS at 08:22

## 2023-09-30 RX ADMIN — IOPAMIDOL 100 ML: 755 INJECTION, SOLUTION INTRAVENOUS at 15:16

## 2023-09-30 RX ADMIN — MAGNESIUM SULFATE HEPTAHYDRATE 2000 MG: 40 INJECTION, SOLUTION INTRAVENOUS at 06:34

## 2023-09-30 RX ADMIN — DEXTROSE 150 MG: 50 INJECTION, SOLUTION INTRAVENOUS at 12:53

## 2023-09-30 RX ADMIN — NOREPINEPHRINE BITARTRATE 2 MCG/MIN: 1 INJECTION, SOLUTION, CONCENTRATE INTRAVENOUS at 23:11

## 2023-09-30 RX ADMIN — KETOROLAC TROMETHAMINE 15 MG: 30 INJECTION, SOLUTION INTRAMUSCULAR at 20:59

## 2023-09-30 RX ADMIN — LEVOFLOXACIN 750 MG: 5 INJECTION, SOLUTION INTRAVENOUS at 06:41

## 2023-09-30 RX ADMIN — AMIODARONE HYDROCHLORIDE 1 MG/MIN: 50 INJECTION, SOLUTION INTRAVENOUS at 13:23

## 2023-09-30 RX ADMIN — SODIUM CHLORIDE, PRESERVATIVE FREE 0.75 ML: 5 INJECTION INTRAVENOUS at 18:30

## 2023-09-30 RX ADMIN — DILTIAZEM HYDROCHLORIDE 10 MG: 5 INJECTION INTRAVENOUS at 06:53

## 2023-09-30 RX ADMIN — AMIODARONE HYDROCHLORIDE 0.5 MG/MIN: 50 INJECTION, SOLUTION INTRAVENOUS at 22:04

## 2023-09-30 RX ADMIN — SODIUM CHLORIDE 5 MG/HR: 900 INJECTION, SOLUTION INTRAVENOUS at 07:21

## 2023-09-30 RX ADMIN — HEPARIN SODIUM 12 UNITS/KG/HR: 10000 INJECTION, SOLUTION INTRAVENOUS at 15:01

## 2023-09-30 RX ADMIN — HYDROCORTISONE SODIUM SUCCINATE 100 MG: 100 INJECTION, POWDER, FOR SOLUTION INTRAMUSCULAR; INTRAVENOUS at 09:21

## 2023-09-30 RX ADMIN — CALCIUM GLUCONATE 2000 MG: 98 INJECTION, SOLUTION INTRAVENOUS at 06:30

## 2023-09-30 RX ADMIN — DILTIAZEM HYDROCHLORIDE 20 MG: 5 INJECTION INTRAVENOUS at 09:10

## 2023-09-30 RX ADMIN — ONDANSETRON 4 MG: 2 INJECTION INTRAMUSCULAR; INTRAVENOUS at 07:03

## 2023-09-30 RX ADMIN — HEPARIN SODIUM 4000 UNITS: 1000 INJECTION INTRAVENOUS; SUBCUTANEOUS at 15:00

## 2023-09-30 ASSESSMENT — ENCOUNTER SYMPTOMS
APNEA: 0
EYE DISCHARGE: 0
VOMITING: 0
ABDOMINAL PAIN: 0
SHORTNESS OF BREATH: 0
ABDOMINAL DISTENTION: 0
DIARRHEA: 0
FACIAL SWELLING: 0

## 2023-09-30 ASSESSMENT — PAIN SCALES - GENERAL
PAINLEVEL_OUTOF10: 0
PAINLEVEL_OUTOF10: 10

## 2023-09-30 ASSESSMENT — LIFESTYLE VARIABLES
HOW OFTEN DO YOU HAVE A DRINK CONTAINING ALCOHOL: NEVER
HOW MANY STANDARD DRINKS CONTAINING ALCOHOL DO YOU HAVE ON A TYPICAL DAY: PATIENT DOES NOT DRINK

## 2023-09-30 ASSESSMENT — PAIN DESCRIPTION - DESCRIPTORS: DESCRIPTORS: ACHING

## 2023-09-30 ASSESSMENT — PAIN DESCRIPTION - LOCATION: LOCATION: GENERALIZED

## 2023-09-30 ASSESSMENT — PAIN - FUNCTIONAL ASSESSMENT: PAIN_FUNCTIONAL_ASSESSMENT: ACTIVITIES ARE NOT PREVENTED

## 2023-09-30 NOTE — ED PROVIDER NOTES
Emergency Department Provider Note       PCP: Marques Smith MD   Age: 78 y.o. Sex: female     DISPOSITION       No diagnosis found. Medical Decision Making     Complexity of Problems Addressed:  1 or more acute illnesses that pose a threat to life or bodily function. Data Reviewed and Analyzed:   I independently ordered and reviewed each unique test.  I reviewed external records: ED visit note from an outside group. I reviewed external records: provider visit note from PCP. I reviewed external records: provider visit note from outside specialist.  I reviewed external records: previous EKG including cardiologist interpretation. I reviewed external records: previous lab results from outside ED. I reviewed external records: previous imaging study including radiologist interpretation. The patients assessment required an independent historian: EMS. The reason they were needed is important historical information not provided by the patient. I independently ordered and interpreted the ED EKG in the absence of a Cardiologist.    Rate: 143  EKG Interpretation: EKG Interpretation: atrial fibrillation, LVH with secondary repolarization abnormality  ST Segments: Nonspecific ST segments - NO STEMI          Discussion of management or test interpretation. Has a loop recorder, but I do not see a history of atrial fibrillation. Likely multifactorial due to dehydration and refusal to eat. Risk of Complications and/or Morbidity of Patient Management:  Drug therapy given requiring intensive monitoring for toxicity. Shared medical decision making was utilized in creating the patients health plan today. Discussed at length with the patient and her  regarding CODE STATUS and possible initiation of hospice. Patient would like to be full code and not consider hospice while in the emergency department, but once stabilized, home hospice may be an option.      Critical care time: 60 minutes of

## 2023-09-30 NOTE — PROGRESS NOTES
TRANSFER - IN REPORT:    Verbal report received from Lady Eligio RN on Tiffanie Abarca  being received from ED for routine progression of patient care      Report consisted of patient's Situation, Background, Assessment and   Recommendations(SBAR). Information from the following report(s) Nurse Handoff Report, ED Encounter Summary, MAR, and Cardiac Rhythm SR  was reviewed with the receiving nurse. Opportunity for questions and clarification was provided. Assessment completed upon patient's arrival to unit and care assumed. Pt arrived accompanied by x2 RN's. Dual skin assessment completed with Dennis Cat RN. Pt with scattered bruising to bilateral upper arms, skin tear to posterior left upper arm and small skin tear to left inner arm. Scab noted to right knee and right shin. Red but blanchable heels and a small nonblanchable wound to lateral right foot.  Allevyn placed to sacrum

## 2023-09-30 NOTE — ED NOTES
Reports received from Maddie Altman, [address] [place] [address] and care [facility] at this time.       Mustapha Stone, [address] [place] [address]  09/30/23 [id number]

## 2023-09-30 NOTE — ED TRIAGE NOTES
Pt sent in from home for failure to thrive not taking meds or eating     Pt is currently in a fib rvr

## 2023-09-30 NOTE — H&P
History of present illness:82 y.o. female history of anxiety depression hypertension hypothyroidism bilateral mastectomy. Patient was sent to [facility] emergency department for extreme fatigue noted to be tachycardic with atrial fibrillation. IV volume resuscitation administered patient initially placed on diltiazem therapy. Patient additionally has had multiple recent admissions in [facility] as well as emergency room visits. She has had a decline in in her health over the past several months to a year. Her  is present at the time of exam.  She and the patient described worsening debility over the past several months anhedonia diminished p.o. intake. Previous diagnosis of failure to thrive as well as suicidal ideations. Her  stated due to his declining health and has been more difficult caring for his wife at home. She has been admitted and discharged from rehab twice in the past several months. She is immobile. Right lower extremity ultrasound performed 9/21/2023 negative for DVT          Review of Systems   Constitutional:  Positive for appetite change. Negative for activity change. HENT:  Negative for congestion. Eyes:  Negative for discharge. Respiratory:  Negative for apnea. Cardiovascular:  Negative for chest pain. Gastrointestinal:  Negative for abdominal distention. Genitourinary:  Negative for difficulty urinating.        Past Medical History:   Diagnosis Date    Anxiety     Arthritis     Cancer ([address])     Depression     History of bone density study 08/2016    Dr. Octaviano Espinoza    Hypercholesterolemia     Hypertension     Onychomycosis     Osteoporosis     Thyroid disease      Past Surgical History:   Procedure Laterality Date    BREAST SURGERY      CHOLECYSTECTOMY      COLONOSCOPY  2012    GYN      HEENT      ORTHOPEDIC SURGERY      TOTAL COLECTOMY  2003    UPPER GASTROINTESTINAL ENDOSCOPY  12/24/2021          Family History   Problem Relation Age of Onset 06:15 AM     09/30/2023 06:15 AM    .9 09/30/2023 06:15 AM     No results found for: \"CPK\", \"RCK1\", \"CKMB\", \"BNP\"  No results found for: \"HBA1C\", \"[id number]\"      Assessment:  Hypotension  White count within normal limits with normal procalcitonin levels. Patient with A-fib with RVR however rates in the low 100s at times hypotension disproportionate ventricular rates with atrial fibrillation  She received IV resuscitation with 3 L of IV crystalloid therapy  Now on Levophed  No recent steroid use cortisol level pending  CT scan for pulmonary embolus pending in light of her lower extremity swelling immobility as well shock  A-fib with RVR  Diltiazem changed to amiodarone. If hypotension persists and other etiologies excluded emergent cardioversion would be an option.   Patient currently not anticoagulated therefore there would be risking cardioversion without transesophageal echocardiogram  Plan to starve eat IV heparin for now hemoglobin 11.0 which is close to her baseline dating back to 2020  Echocardiogram pending  Hypothyroidism  Currently prescribed Synthroid 100 mcg daily most recent labs indicative of hypothyroidism  Failure to thrive  Multiple admissions in the past labs indicative of poor nutrition  Likely will need long-term placement plan in the future  is unable to provide care at home

## 2023-09-30 NOTE — ED PROVIDER NOTES
Emergency Department Provider Signout / Continuation of Care Note         DISPOSITION Decision To Admit 09/30/2023 11:27:07 AM       ICD-10-CM    1. Atrial fibrillation with RVR (HCC)  I48.91       2. Hypotension, unspecified hypotension type  I95.9       3. Hypothyroidism, unspecified type  E03.9       4. Elevated troponin I level  R77.8           The patient's care was signed out to me at shift change. Final Plan      Patient had a somewhat tenuous course in the ER for checkup. Remains in A-fib with RVR. Mostly not rate controlled with blood pressures have been significantly labile. She has not responded to IV fluids she has elevated lactic acid. Also noted to have a free T4 concerning for possible hypothyroidism. I did treat with hydrocortisone. She was recently prescribed hydrocodone so she was treated with a dose of Narcan to see if she had improvement in BP. BP did not improve. I have since placed a right IJ central line. Vasopressors.   Will discuss case with intensivist, will consult cardiology as well    Central Line    Date/Time: 9/30/2023 11:29 AM    Performed by: Keila Jones MD  Authorized by: Keila Jones MD    Consent:     Consent obtained:  Verbal    Consent given by:  Spouse    Risks, benefits, and alternatives were discussed: yes      Risks discussed:  Incorrect placement, nerve damage and pneumothorax    Alternatives discussed:  No treatment and delayed treatment  Universal protocol:     Procedure explained and questions answered to patient or proxy's satisfaction: yes      Relevant documents present and verified: yes      Test results available: yes      Imaging studies available: yes      Required blood products, implants, devices, and special equipment available: yes      Site/side marked: yes      Immediately prior to procedure, a time out was called: yes      Patient identity confirmed:  Verbally with patient and hospital-assigned identification number  Pre-procedure

## 2023-09-30 NOTE — CONSULTS
HISTORY AND PHYSICAL  Juliette Peterson  9/30/2023   Date of Admission:  9/30/2023    The patient's chart is reviewed and the patient is discussed with the staff. Subjective:     Patient is a 78 y.o. female presents with anxiety, arthritis, hx of breast cancer with mastectomy, HLD, HTN, osteoporosis and thyroid disease (possible hypothyroidism), former smoker. Presented into the ER on 9/30 via EMS from home with family stating they are unable to take care of her. She has not been eating or drinking for several days, urination has decreased, and she is unable to get out of bed. She was recently discharged from [facility] on 9/21. After being discharged, she was seen at [facility] ER for right leg swelling- US DVT negative, no fracture. Home health referral placed, placed on Doxy for cellulitis. She has been discussing hospice with her primary care provider. Was seen on VV by PCP on 9/29- pt's daughter relayed that pt is not taking medication, is \"staring in and out of space\", not eating/drinking. On arrival to ER, she was in A fib RVR with HR as high as 170's. Labs are as follows: troponin 1,095, T4 free 0.7, BC in process, LA 3.2 on arrival and has decreased to 1.8, procal WNL. She has received mag sulfate, Levaquin, 2L LR bolus, started on Levo gtt and Cardizem gtt. Cardiology was consulted in ER- switching to Amio gtt and placing CT PE.      Review of Systems  Constitutional: anorexia, fatigue  Resp: negative for shortness of breath, cough, ,sputum production  CV: negative for chest pain   : decreased urination   MSK: limited to no mobility     Current Outpatient Medications   Medication Instructions    ascorbic acid (VITAMIN C) 500 mg, Oral, DAILY    aspirin 81 mg, Oral, DAILY    atorvastatin (LIPITOR) 40 mg, Oral, DAILY    BIOTIN PO 5,000 mcg, Oral, DAILY    buprenorphine (BUPRENEX) 15 MCG/HR PTWK 1 patch, TransDERmal

## 2023-09-30 NOTE — ED NOTES
Pt reports having pain to posterior left lower arm with swelling and redness noted. Provider aware.      Cedrick Gutierrez RN  09/30/23 8935

## 2023-10-01 ENCOUNTER — APPOINTMENT (OUTPATIENT)
Dept: GENERAL RADIOLOGY | Age: 83
DRG: 308 | End: 2023-10-01
Payer: MEDICARE

## 2023-10-01 LAB
ALBUMIN SERPL-MCNC: 1.5 G/DL (ref 3.2–4.6)
ALBUMIN/GLOB SERPL: 0.4 (ref 0.4–1.6)
ALP SERPL-CCNC: 88 U/L (ref 50–136)
ALT SERPL-CCNC: 14 U/L (ref 12–65)
ANION GAP SERPL CALC-SCNC: 10 MMOL/L (ref 2–11)
AST SERPL-CCNC: 31 U/L (ref 15–37)
BASOPHILS # BLD: 0 K/UL (ref 0–0.2)
BASOPHILS NFR BLD: 0 % (ref 0–2)
BILIRUB SERPL-MCNC: 0.4 MG/DL (ref 0.2–1.1)
BUN SERPL-MCNC: 20 MG/DL (ref 8–23)
CALCIUM SERPL-MCNC: 8.1 MG/DL (ref 8.3–10.4)
CHLORIDE SERPL-SCNC: 114 MMOL/L (ref 101–110)
CO2 SERPL-SCNC: 20 MMOL/L (ref 21–32)
CREAT SERPL-MCNC: 1.1 MG/DL (ref 0.6–1)
DIFFERENTIAL METHOD BLD: ABNORMAL
EOSINOPHIL # BLD: 0 K/UL (ref 0–0.8)
EOSINOPHIL NFR BLD: 0 % (ref 0.5–7.8)
ERYTHROCYTE [DISTWIDTH] IN BLOOD BY AUTOMATED COUNT: 15 % (ref 11.9–14.6)
GLOBULIN SER CALC-MCNC: 3.4 G/DL (ref 2.8–4.5)
GLUCOSE SERPL-MCNC: 150 MG/DL (ref 65–100)
HCT VFR BLD AUTO: 30.6 % (ref 35.8–46.3)
HGB BLD-MCNC: 9.8 G/DL (ref 11.7–15.4)
IMM GRANULOCYTES # BLD AUTO: 0.2 K/UL (ref 0–0.5)
IMM GRANULOCYTES NFR BLD AUTO: 2 % (ref 0–5)
LYMPHOCYTES # BLD: 1.6 K/UL (ref 0.5–4.6)
LYMPHOCYTES NFR BLD: 16 % (ref 13–44)
MAGNESIUM SERPL-MCNC: 1.7 MG/DL (ref 1.8–2.4)
MCH RBC QN AUTO: 31.9 PG (ref 26.1–32.9)
MCHC RBC AUTO-ENTMCNC: 32 G/DL (ref 31.4–35)
MCV RBC AUTO: 99.7 FL (ref 82–102)
MONOCYTES # BLD: 0.7 K/UL (ref 0.1–1.3)
MONOCYTES NFR BLD: 7 % (ref 4–12)
NEUTS SEG # BLD: 7.7 K/UL (ref 1.7–8.2)
NEUTS SEG NFR BLD: 75 % (ref 43–78)
NRBC # BLD: 0 K/UL (ref 0–0.2)
PLATELET # BLD AUTO: 237 K/UL (ref 150–450)
PMV BLD AUTO: 9.7 FL (ref 9.4–12.3)
POTASSIUM SERPL-SCNC: 4.1 MMOL/L (ref 3.5–5.1)
PROT SERPL-MCNC: 4.9 G/DL (ref 6.3–8.2)
RBC # BLD AUTO: 3.07 M/UL (ref 4.05–5.2)
SODIUM SERPL-SCNC: 144 MMOL/L (ref 133–143)
UFH PPP CHRO-ACNC: >1.1 IU/ML (ref 0.3–0.7)
WBC # BLD AUTO: 10.2 K/UL (ref 4.3–11.1)

## 2023-10-01 PROCEDURE — 6360000002 HC RX W HCPCS: Performed by: INTERNAL MEDICINE

## 2023-10-01 PROCEDURE — 6360000002 HC RX W HCPCS: Performed by: NURSE PRACTITIONER

## 2023-10-01 PROCEDURE — 2580000003 HC RX 258: Performed by: INTERNAL MEDICINE

## 2023-10-01 PROCEDURE — 2580000003 HC RX 258: Performed by: NURSE PRACTITIONER

## 2023-10-01 PROCEDURE — 2580000003 HC RX 258

## 2023-10-01 PROCEDURE — 2100000000 HC CCU R&B

## 2023-10-01 PROCEDURE — 36592 COLLECT BLOOD FROM PICC: CPT

## 2023-10-01 PROCEDURE — 80053 COMPREHEN METABOLIC PANEL: CPT

## 2023-10-01 PROCEDURE — 85520 HEPARIN ASSAY: CPT

## 2023-10-01 PROCEDURE — 71045 X-RAY EXAM CHEST 1 VIEW: CPT

## 2023-10-01 PROCEDURE — 83735 ASSAY OF MAGNESIUM: CPT

## 2023-10-01 PROCEDURE — 6360000002 HC RX W HCPCS: Performed by: HOSPITALIST

## 2023-10-01 PROCEDURE — 85025 COMPLETE CBC W/AUTO DIFF WBC: CPT

## 2023-10-01 PROCEDURE — 2700000000 HC OXYGEN THERAPY PER DAY

## 2023-10-01 PROCEDURE — 99291 CRITICAL CARE FIRST HOUR: CPT | Performed by: INTERNAL MEDICINE

## 2023-10-01 RX ORDER — KETOROLAC TROMETHAMINE 30 MG/ML
15 INJECTION, SOLUTION INTRAMUSCULAR; INTRAVENOUS EVERY 6 HOURS PRN
Status: DISCONTINUED | OUTPATIENT
Start: 2023-10-01 | End: 2023-10-04 | Stop reason: HOSPADM

## 2023-10-01 RX ORDER — FUROSEMIDE 10 MG/ML
20 INJECTION INTRAMUSCULAR; INTRAVENOUS ONCE
Status: COMPLETED | OUTPATIENT
Start: 2023-10-01 | End: 2023-10-01

## 2023-10-01 RX ORDER — LOPERAMIDE HYDROCHLORIDE 2 MG/1
2 CAPSULE ORAL PRN
Status: DISCONTINUED | OUTPATIENT
Start: 2023-10-01 | End: 2023-10-04 | Stop reason: HOSPADM

## 2023-10-01 RX ORDER — LORAZEPAM 2 MG/ML
2 INJECTION INTRAMUSCULAR
Status: DISCONTINUED | OUTPATIENT
Start: 2023-10-01 | End: 2023-10-01

## 2023-10-01 RX ORDER — MORPHINE SULFATE 2 MG/ML
2 INJECTION, SOLUTION INTRAMUSCULAR; INTRAVENOUS
Status: DISCONTINUED | OUTPATIENT
Start: 2023-10-01 | End: 2023-10-04 | Stop reason: HOSPADM

## 2023-10-01 RX ORDER — GLYCOPYRROLATE 0.2 MG/ML
0.2 INJECTION INTRAMUSCULAR; INTRAVENOUS EVERY 4 HOURS PRN
Status: DISCONTINUED | OUTPATIENT
Start: 2023-10-01 | End: 2023-10-04 | Stop reason: HOSPADM

## 2023-10-01 RX ORDER — GLYCOPYRROLATE 0.2 MG/ML
0.1 INJECTION INTRAMUSCULAR; INTRAVENOUS EVERY 6 HOURS PRN
Status: DISCONTINUED | OUTPATIENT
Start: 2023-10-01 | End: 2023-10-04 | Stop reason: HOSPADM

## 2023-10-01 RX ORDER — LORAZEPAM 2 MG/ML
1 INJECTION INTRAMUSCULAR EVERY 4 HOURS PRN
Status: DISCONTINUED | OUTPATIENT
Start: 2023-10-01 | End: 2023-10-04 | Stop reason: HOSPADM

## 2023-10-01 RX ADMIN — AMIODARONE HYDROCHLORIDE 1 MG/MIN: 50 INJECTION, SOLUTION INTRAVENOUS at 05:58

## 2023-10-01 RX ADMIN — KETOROLAC TROMETHAMINE 15 MG: 30 INJECTION, SOLUTION INTRAMUSCULAR at 08:26

## 2023-10-01 RX ADMIN — SODIUM CHLORIDE, PRESERVATIVE FREE 10 ML: 5 INJECTION INTRAVENOUS at 08:22

## 2023-10-01 RX ADMIN — CEFTRIAXONE 1000 MG: 1 INJECTION, POWDER, FOR SOLUTION INTRAMUSCULAR; INTRAVENOUS at 10:01

## 2023-10-01 RX ADMIN — KETOROLAC TROMETHAMINE 15 MG: 30 INJECTION, SOLUTION INTRAMUSCULAR at 16:00

## 2023-10-01 RX ADMIN — DEXTROSE 150 MG: 50 INJECTION, SOLUTION INTRAVENOUS at 03:59

## 2023-10-01 RX ADMIN — MORPHINE SULFATE 2 MG: 2 INJECTION, SOLUTION INTRAMUSCULAR; INTRAVENOUS at 21:39

## 2023-10-01 RX ADMIN — FUROSEMIDE 20 MG: 10 INJECTION, SOLUTION INTRAMUSCULAR; INTRAVENOUS at 08:21

## 2023-10-01 RX ADMIN — MORPHINE SULFATE 2 MG: 2 INJECTION, SOLUTION INTRAMUSCULAR; INTRAVENOUS at 18:53

## 2023-10-01 RX ADMIN — MORPHINE SULFATE 2 MG: 2 INJECTION, SOLUTION INTRAMUSCULAR; INTRAVENOUS at 17:28

## 2023-10-01 ASSESSMENT — PAIN DESCRIPTION - LOCATION
LOCATION: HEAD
LOCATION: HEAD;NECK

## 2023-10-01 ASSESSMENT — PAIN SCALES - GENERAL
PAINLEVEL_OUTOF10: 4
PAINLEVEL_OUTOF10: 0
PAINLEVEL_OUTOF10: 0

## 2023-10-01 NOTE — PROGRESS NOTES
diameter is 2.9 cm. Ao Root Index is 1.58 cm/m2. Pericardium: There is a localized pericardial effusion present around the right ventricle. There is right ventricular systolic collapse. The  largest diameter in systole 1.4 cm. In diastole no pericardial space is visible. No indication of cardiac tamponade. Left pleural effusion. Contrast used: Definity. Antibiotics:  Inpat Anti-Infectives (From admission, onward)       Start     Ordered Stop    10/01/23 1300  vancomycin (VANCOCIN) 1,000 mg in sodium chloride 0.9 % 250 mL IVPB ([id number])  1,000 mg,   IntraVENous,   EVERY 24 HOURS         09/30/23 1647 --             Microbiology:   Results       Procedure Component Value Units Date/Time    Blood Culture 1 [[id number]] Collected: 09/30/23 0627    Order Status: Sent Specimen: Blood Updated: 09/30/23 0754    Blood Culture 2 [[id number]] Collected: 09/30/23 0616    Order Status: Sent Specimen: Blood Updated: 09/30/23 0754          Ventilator Settings Ideal body weight: 63.9 kg (140 lb 14 oz)  Adjusted ideal body weight: 65.3 kg (143 lb 15.4 oz)  Mode FIO2 Rate Tidal Volume Pressure                           Peak airway pressure:     Minute ventilation:    ABG:No results for input(s): \"PHAPOC\", \"ALZ1RIKI\", \"KW2UYUL\", \"XAY4DOB\", \"BE\" in the last 72 hours. Assessment and Plan:  (Medical Decision Making)   Impression: 78 y.o. female with apparent failure to thrive, PMH includes breast cancer, HTN, and thyroid disease who was admitted to the ER on 9/30- family relaying they are unable to care for her. Was in A fib RVR on arrival with rate as high as 170's. Cardiology consulted. On Amio and Levo gtts to maintain BP. Asked to consult for ICU admission. Awaiting CT PE results. LA initially elevated but has improved, BC in process, no urine specimen sent. Has had R leg swelling and was negative for DVT but immobile at home. NEURO:   FTT: declining with multiple hospitalizations and not improving.  Palliative care of service is 30 mins.     Patrick Posadas MD

## 2023-10-01 NOTE — ACP (ADVANCE CARE PLANNING)
[facility] Hospitalist Service  At the heart of better care     Advance Care Planning   Admit Date:  2023  6:04 AM   Name:  Darya Montana   Age:  78 y.o. Sex:  female  :  1940   MRN:  [id number]   Room:  Melly has capacity to make her own decisions:   Yes    If pt unable to make decisions, POA/surrogate decision maker:  Spouse    Other people present:   Spouse    Patient / surrogate decision-maker directed code status:  DNR/DNI    Other ACP topics discussed, if applicable:   Discussed possibility of hospice or comfort measures    Patient or surrogate consented to discussion of the current conditions, workup, management plans, prognosis, and the risk for further deterioration. Time spent: 22 minutes in direct discussion.       Signed:  Jacqueline Prince MD

## 2023-10-01 NOTE — PROGRESS NOTES
Preferred name -  giselle    Spouse -  anant    Exp d/c  6  days      Lives locally    Dnr    Has ACP    My chart is active

## 2023-10-01 NOTE — PROGRESS NOTES
[facility] CARDIOLOGY PROGRESS NOTE           10/1/2023 7:52 AM    Admit Date: 9/30/2023      Subjective:     Patient uncomfortable overnight rates uncontrolled    Review of Systems        Objective:      Vitals:    10/01/23 0645 10/01/23 0700 10/01/23 0701 10/01/23 0715   BP: (!) 77/57 (!) 80/63 (!) 86/60 92/67   Pulse: (!) 137 (!) 142 (!) 144 (!) 142   Resp: 13 13 12 13   Temp:       TempSrc:       SpO2: 99% 99% 99% 98%   Weight:       Height:             Physical Exam  Vitals reviewed. Constitutional:       Appearance: She is underweight. She is ill-appearing. HENT:      Head: Normocephalic. Right Ear: External ear normal.      Left Ear: External ear normal.      Nose: Nose normal.   Eyes:      General: No scleral icterus. Cardiovascular:      Rate and Rhythm: Tachycardia present. Rhythm irregular. Pulmonary:      Effort: Pulmonary effort is normal.      Breath sounds: Examination of the right-lower field reveals decreased breath sounds. Examination of the left-lower field reveals decreased breath sounds. Decreased breath sounds present. Abdominal:      General: There is no distension. Musculoskeletal:      Cervical back: Neck supple. Skin:     General: Skin is warm. Neurological:      General: No focal deficit present. Psychiatric:         Mood and Affect: Mood is depressed. Affect is labile.          Data Review:   Recent Labs     09/30/23  0615 09/30/23  1457 10/01/23  0355     --  144*   K 4.2  --  4.1   MG 1.7*  --  1.7*   BUN 19  --  20   WBC 6.9 7.8 10.2   HGB 11.0* 11.0* 9.8*   HCT 34.5* 34.5* 30.6*    236 237   INR  --  1.1  --        [unfilled]  Current Facility-Administered Medications   Medication Dose Route Frequency    norepinephrine (LEVOPHED) 4 mg in sodium chloride 0.9 % 250 mL infusion  1-100 mcg/min IntraVENous Continuous    amiodarone (CORDARONE) 450 mg in dextrose 5 % 250 mL infusion ([id number])  1 mg/min IntraVENous Continuous

## 2023-10-01 NOTE — PROGRESS NOTES
After discussion this morning family has decided to proceed with comfort measures and hospice evaluation only. Will stop IV drips and support. Will consult hospice and can continue antibiotics and add dilt PO if can take in place of amiodarone gtt for afib w/ RVR. Concerned that given her severe SVT she may not last very long and may be an inpatient hospice candidate.  aware.     Robinson Holder MD

## 2023-10-01 NOTE — PROGRESS NOTES
Hospice referral received. Met at bedside with Mrs Kayy Thompson. She is oriented and able to make her needs known. She denies pain, dyspnea, or other complaints. She states she would like cold applesauce to eat. She tells me she is \"tired\" and wants to pull away from all interventions; agrees with comfort directed care. She states she wants to die at home but worries for her  and his ability to care for her on his own, stating \"I don't want to be a burden to him. \"     Patient's spouse Letty Doe is gone for the day; briefly discussed hospice via phone. Liaison will meet with patient and spouse Monday morning at 1000 for hospice presentation and evaluation. Son from [place] is also flying in on Monday to be with family. Discussed with Leigh Ann LOCK.     Thank you for this referral,   Manny Alcantara RN  [facility] Hospice Liaison  [phone] S:  Pt is  at 28w2d for routine OB follow up.  No c/o today.  Reports good FM.  Denies VB, LOF, RUCs or vaginal DC.     O:  Please see above vitals.        FHTs: 150        Fundal ht: 28 cm.       1hr GTT: 170 - abnormal    A:  IUP at 28w2d  Patient Active Problem List    Diagnosis Date Noted   • Supervision of normal pregnancy in second trimester 2020   • Abnormal pregnancy US - possible VSD, no f/u US per Dr Rice after US 20, needs PP fetal eval 2020   • Rh negative state in antepartum period 2020        P:  1.  PP contraception unsure.  Declines BTL. Contraception handout given.        2.  Instructions given on FKCs.          3.  Questions answered.          4.  Encourage good hand hygiene, social distancing and avoiding sick contacts.        5.  Encourage adequate water intake.        6.  3hr GTT needed - encouraged pt to complete asap.        7.   labor precautions reviewed.         8.  F/u 3 wks.        9.  TDap and rhogam given today.    I have placed the below orders and discussed them with an approved delegating provider. The MA is performing the below orders under the direction of  Dr. Mendoza.

## 2023-10-01 NOTE — PROGRESS NOTES
Reassessments deferred today due to comfort care measures. Pt states she is comfortable at this moment and family at bedside.

## 2023-10-01 NOTE — CONSULTS
[facility] Hospitalist Consult   Admit Date:  2023  6:04 AM   Name:  Lonzell Saint   Age:  78 y.o. Sex:  female  :  1940   MRN:  [id number]   Room:  [facility]    Presenting/Chief Complaint: Fatigue    Reason(s) for Admission: Hypotension [I95.9]  Atrial fibrillation with RVR ([address]) [I48.91]  Elevated troponin I level [R79.89]  Hypotension, unspecified hypotension type [I95.9]  Hypothyroidism, unspecified type [E03.9]     Hospitalists consulted by Nya Matias MD for: To assume care as primary    History of Presenting Illness:   Lonzell Saint is a 78 y.o. female with history of breast cancer with mastectomy, HLD, HTN, osteoporosis, hypothyroidism, former smoker, anxiety, arthritis, admitted on  in view of rapid A-fib RVR, failure to thrive and overall clinical decline. Hospitalist team was consulted on 10/1 to resume care as patient has been transitioned to comfort care measures. I personally spoke to patient's  over the phone, discussed with him about patient's overall medical conditions. Overnight patient has stayed in A-fib RVR with heart rate in 140s. Patient has laying in bed with eyes closed, minimally interactive. No reported fever, diarrhea, vomiting reported by nursing staff. I confirmed with patient's  about goals of care for the patient being switched to comfort care measures and hospice. Assessment & Plan:     Principal Problem:    Hypotension      Atrial fibrillation with RVR (HCC)      Frailty      Acute hypotension      Failure to thrive in adult      Decreased urination    10/1-  Patient is transition to comfort care measures only with end-of-life care. Patient to be transferred out of CCU today. PT/OT evals and PPD needed/ordered?   No  Diet:  Diet NPO  VTE prophylaxis: No VTE prophylaxis needed  Code status: DNR    Non-peripheral Lines and Tubes (if present):      External Urinary Catheter (Active)     CVC Quadruple Lumen No

## 2023-10-01 NOTE — CARE COORDINATION
CM received call from CCU RN that pt is being changed to comfort measures and family is interested in hospice services. CM attempted to meet with spouse but he had left the hospital.  TC to spouse. Left VM message requesting a return call. Pt is DNR. CM to continue to follow. 1400: CM contacted pt's spouse at number provided by RN as the corrected contact number. Spouse confirmed they are interested in hospice services and expressed no preference in agency but he does want her to go to a hospice facility. He is agreeable with referral to [address] [place] [address]. Referral submitted. CM informed spouse to expect a call from the hospice liaison. He verbalized understanding. 10/01/23 5467   Service Assessment   Patient Orientation Unable to Assess   Cognition Other (see comment)  (sleeping)   History Provided By Medical Record   Primary Caregiver Spouse   Support Systems Spouse/Significant Other;Children   Patient's Healthcare Decision Maker is: Legal Next of Kin   PCP Verified by HAYDE Yes   Last Visit to PCP Within last 3 months   Prior Functional Level Assistance with the following:;Cooking;Housework; Shopping;Mobility   Current Functional Level Assistance with the following:;Bathing;Dressing; Toileting;Cooking;Housework; Shopping;Mobility; Feeding   Can patient return to prior living arrangement Unknown at present   Ability to make needs known: Poor   Family able to assist with home care needs: Other (comment)  (TBD- spouse having increasing difficulty caring for pt in home)   Would you like for me to discuss the discharge plan with any other family members/significant others, and if so, who?  Yes  (spouse)   Financial Resources Medicare   Community Resources ECF/Home Care   Social/Functional History   Lives With Spouse   Receives Help From Family   ADL Assistance Needs assistance   Toileting Needs assistance   Homemaking Assistance Needs assistance   Ambulation Assistance Needs assistance

## 2023-10-02 PROCEDURE — 6360000002 HC RX W HCPCS: Performed by: INTERNAL MEDICINE

## 2023-10-02 PROCEDURE — 6360000002 HC RX W HCPCS: Performed by: HOSPITALIST

## 2023-10-02 PROCEDURE — 99232 SBSQ HOSP IP/OBS MODERATE 35: CPT | Performed by: INTERNAL MEDICINE

## 2023-10-02 PROCEDURE — 1100000000 HC RM PRIVATE

## 2023-10-02 RX ADMIN — LORAZEPAM 1 MG: 2 INJECTION INTRAMUSCULAR; INTRAVENOUS at 20:47

## 2023-10-02 RX ADMIN — MORPHINE SULFATE 2 MG: 2 INJECTION, SOLUTION INTRAMUSCULAR; INTRAVENOUS at 00:29

## 2023-10-02 RX ADMIN — MORPHINE SULFATE 2 MG: 2 INJECTION, SOLUTION INTRAMUSCULAR; INTRAVENOUS at 09:25

## 2023-10-02 RX ADMIN — MORPHINE SULFATE 2 MG: 2 INJECTION, SOLUTION INTRAMUSCULAR; INTRAVENOUS at 23:52

## 2023-10-02 RX ADMIN — MORPHINE SULFATE 2 MG: 2 INJECTION, SOLUTION INTRAMUSCULAR; INTRAVENOUS at 10:42

## 2023-10-02 RX ADMIN — MORPHINE SULFATE 2 MG: 2 INJECTION, SOLUTION INTRAMUSCULAR; INTRAVENOUS at 15:02

## 2023-10-02 RX ADMIN — MORPHINE SULFATE 2 MG: 2 INJECTION, SOLUTION INTRAMUSCULAR; INTRAVENOUS at 20:47

## 2023-10-02 NOTE — PROGRESS NOTES
TRANSFER - OUT REPORT:    Verbal report given to Tommy Santoro RN on Doc Orf  being transferred to [address] [place] [address]  for routine progression of patient care       Report consisted of patient's Situation, Background, Assessment and   Recommendations(SBAR). Information from the following report(s) Nurse Handoff Report, ED Encounter Summary, Adult Overview, Intake/Output, MAR, Recent Results, and Cardiac Rhythm afib RVR  was reviewed with the receiving nurse.            Lines:   CVC Quadruple Lumen 09/30/23 Right Internal jugular (Active)   Central Line Being Utilized Yes 10/01/23 0701   Criteria for Appropriate Use Limited/no vessel suitable for conventional peripheral access 10/01/23 0701   Site Assessment Clean, dry & intact 10/01/23 0701   Phlebitis Assessment No symptoms 10/01/23 0701   Infiltration Assessment 0 10/01/23 0701   Color/Movement/Sensation Capillary refill less than 3 sec 10/01/23 0701   Proximal Lumen Color/Status Infusing 10/01/23 0701   Medial 1 Lumen Color/Status Infusing 10/01/23 0701   Medial 2 Lumen Color/Status Infusing 10/01/23 0701   Distal Lumen Color/Status Infusing 10/01/23 0701   Line Care Connections checked and tightened 10/01/23 0701   Alcohol Cap Used Yes 10/01/23 0701   Dressing Type Transparent 10/01/23 0701   Dressing Status Clean, dry & intact 10/01/23 0701       Peripheral IV 09/30/23 Right Antecubital (Active)   Site Assessment Clean, dry & intact 10/01/23 0701   Line Status Capped 10/01/23 0300   Line Care Connections checked and tightened 10/01/23 0300   Phlebitis Assessment No symptoms 10/01/23 0300   Infiltration Assessment 0 10/01/23 0300   Alcohol Cap Used Yes 10/01/23 0300   Dressing Status Clean, dry & intact 10/01/23 0300   Dressing Type Transparent 10/01/23 0300       Peripheral IV 09/30/23 Left Forearm (Active)   Site Assessment Clean, dry & intact 10/01/23 0701   Line Status Capped 10/01/23 0300   Line Care Connections checked and tightened 10/01/23 0300   Phlebitis

## 2023-10-02 NOTE — PROGRESS NOTES
END OF SHIFT SUMMARY:    Morphine given x3    I/Os:  No intake/output data recorded.   Unmeasured Occurrences this Shift:  Urine 0      Bedside shift report given to CELESTE Chavez RN

## 2023-10-02 NOTE — PROGRESS NOTES
(ROBINUL) injection 0.1 mg  0.1 mg IntraVENous Q6H PRN    sodium chloride flush 0.9 % injection 5-40 mL  5-40 mL IntraVENous PRN    0.9 % sodium chloride infusion   IntraVENous PRN    acetaminophen (TYLENOL) tablet 650 mg  650 mg Oral Q6H PRN    Or    acetaminophen (TYLENOL) suppository 650 mg  650 mg Rectal Q6H PRN       Signed:  ISAAC SANCHEZ DO    Part of this note may have been written by using a voice dictation software. The note has been proof read but may still contain some grammatical/other typographical errors.

## 2023-10-02 NOTE — PROGRESS NOTES
TRANSFER - IN REPORT:    Verbal report received from [address] on [address]  being received from CCU for routine progression of patient care      Report consisted of patient's Situation, Background, Assessment and   Recommendations(SBAR). Information from the following report(s) Nurse Handoff Report, Index, Intake/Output, MAR, and Recent Results was reviewed with the receiving nurse. Opportunity for questions and clarification was provided. Assessment completed upon patient's arrival to unit and care assumed.

## 2023-10-02 NOTE — CARE COORDINATION
Chart screened by CM for d/c planning. This morning pt was transferred from CCU room 3301 to 505 for comfort care. Per progress notes a referral was made on 10/1 to [facility] and there is a family meeting scheduled today at 56 with hospice. CM will continue to follow.

## 2023-10-02 NOTE — CONSULTS
Consult received, chart reviewed. Pt now  comfort measures only, and hospice consult is pending. No PC needs identified- consult will not be completed.     Sujata Call, FNP-C  Palliative Care Preop Dx: Cervical spondylosis with myelopathy     Postop Dx:Same     Procedure:Application of skull fixation                      C, C7 laminectomy                      C2-C6 laminoplasty with canal reconstruction                      Segmental instrumentation C2-C6      Attending:Boone     Assistant:Dr Mehta    Please note that the surgical assistant was medically necessary to help position the patient, to expose the spine, to prep the skin, to carefully decompress the spinal cord, and to place the segmental instrumentation and to help close the incision, the surgical assistant was medically necessary.     Anesthesia:GETA     Findings:Stenosis     Complications:None     Implants:Dom Escalate Implants C2-C6     EBL:500     Disposition:Extubated->PACU              Brief history: The patient with cervical spondylosis and C1 compression of the spinal canal and spinal cord with cervical myelopathy and falling.  She also has spondylosis and subaxial stenosis.  Therefore it was felt that C1 laminectomy and cervical decompression via laminoplasty was appropriate and would help her with her functional capacity and prevent further falling.  Risks of surgery were discussed and serial surgery was performed on an urgent basis June 1, 2023.    Procedure in detail: Patient was given antibiotics and sedated placed under general anesthesia and intubated.  She was fixated into a Moreno head cullen and placed in the prone position with the cervical region immobilized in a neutral posture on a Kunal frame.  Scalp is healed appropriately and we performed a suboccipital incision from the suboccipital region down to the upper thoracic region and a midline incision was infiltrated with local anesthesia and prepped and draped in the normal sterile fashion.  We cut through the skin exposed the suboccipital region as well as C1-C2-C3 C4-C5-C6 and C7.  The anatomy made localization self-evident.  We carefully used a  high-speed drill to complete suboccipital decompression and C1 laminectomy.  We also performed lamina laminoplasty from C2-C6 as follows: We performed a laminotomy at the right side at the lamina facet junction at C2 C3-C4-C5 and C6 using a high-speed drill and 2 mm Kerrison rongeur.  Then we performed partial laminotomy at the lamina facet junction on the left side at C2-C3 C4-C5-C6 and C7.  We also completed foraminotomies at C4-5 and C5-6 bilaterally.  We were able to complete canal reconstruction and expand the spinal canal using standard laminoplasty technique.  We were then able to fixate Wilmot Escalade implant using miniature screws 1 instrumentation per level at C2 C3-C4-C5 and C6.  This allowed us to stabilize the spinal canal reconstruction and decompress the spinal canal and the spinal cord.  Hemostasis was achieved with bipolar electrocautery.  The wound was irrigated with normal saline.  There was no obvious instability no evidence of dural injury or no other complications.  2 drains were placed in the surgical exposure and tunneled out through skin and fascia.  We reapproximated the musculature and fascia and subcutaneous tissues with interrupted Vicryl suture and closed the skin with staples.  Sterile dressings were applied.  Patient was returned to the supine position and the Moreno head cullen was removed and she woke from anesthesia in stable neurologic condition.  There were no complications.

## 2023-10-02 NOTE — INTERDISCIPLINARY ROUNDS
Multi-D Rounds/Checklist (leapfrog):  Lines: can any be removed?: None    External Urinary Catheter (Active)     CVC Quadruple Lumen 09/30/23 Right Internal jugular (Active)     DVT Prophylaxis: Ordered  Vent: N/A  Nutrition Ordered/appropriate: Per Primary Team  Can antibiotics or other drugs be stopped? N/A Yes/No  Inpat Anti-Infectives (From admission, onward)      None          Consults needed: hospice  A: Is pain control adequate? (has PRNs? Stop drip?) Yes  B: Sedation break and SBT? N/A  C: Is sedation choice appropriate? N/A  D: Delirium/CAM-ICU? No  E: Mobility goals/appropriateness? N/A  F: Family update and plan?  is surrogate decision maker and is being updated daily by primary attending and nursing staff.     SOULEYMANE Burciaga

## 2023-10-02 NOTE — PROGRESS NOTES
Liaison met with spouse Bard Mayers, daughter Dyan Serna, and son and daughter in law GORDONBROOK and [place] at bedside to discuss hospice philosophy of care, hospice team members and frequency of visits. We also discussed the [facility] for general inpatient care with symptom management and respite care. Answered all questions. Thank you for the referral to [address] [place] [address]. This Hospice Liaison received approval for GIP at [facility]. Per hospice provider  we want family to be well informed about possibility of patient passing en route to [facility]. This was discussed with family by hospice liaison and family wishes to keep patient at hospital. Updated Cat CM, updated Lattie Duet, and hospital of all the above. Greg Hernandez RN  Hospice Nurse Liaison  [phone]: C  [phone]:  O

## 2023-10-03 PROCEDURE — 1100000000 HC RM PRIVATE

## 2023-10-03 PROCEDURE — 6360000002 HC RX W HCPCS: Performed by: HOSPITALIST

## 2023-10-03 PROCEDURE — 2700000000 HC OXYGEN THERAPY PER DAY

## 2023-10-03 PROCEDURE — 6370000000 HC RX 637 (ALT 250 FOR IP): Performed by: NURSE PRACTITIONER

## 2023-10-03 PROCEDURE — 6360000002 HC RX W HCPCS: Performed by: INTERNAL MEDICINE

## 2023-10-03 RX ORDER — SCOLOPAMINE TRANSDERMAL SYSTEM 1 MG/1
1 PATCH, EXTENDED RELEASE TRANSDERMAL
Status: DISCONTINUED | OUTPATIENT
Start: 2023-10-03 | End: 2023-10-04 | Stop reason: HOSPADM

## 2023-10-03 RX ADMIN — MORPHINE SULFATE 2 MG: 2 INJECTION, SOLUTION INTRAMUSCULAR; INTRAVENOUS at 07:37

## 2023-10-03 RX ADMIN — LORAZEPAM 1 MG: 2 INJECTION INTRAMUSCULAR; INTRAVENOUS at 05:23

## 2023-10-03 RX ADMIN — MORPHINE SULFATE 2 MG: 2 INJECTION, SOLUTION INTRAMUSCULAR; INTRAVENOUS at 11:56

## 2023-10-03 RX ADMIN — MORPHINE SULFATE 2 MG: 2 INJECTION, SOLUTION INTRAMUSCULAR; INTRAVENOUS at 05:23

## 2023-10-03 RX ADMIN — MORPHINE SULFATE 2 MG: 2 INJECTION, SOLUTION INTRAMUSCULAR; INTRAVENOUS at 18:10

## 2023-10-03 RX ADMIN — MORPHINE SULFATE 2 MG: 2 INJECTION, SOLUTION INTRAMUSCULAR; INTRAVENOUS at 09:34

## 2023-10-03 RX ADMIN — MORPHINE SULFATE 2 MG: 2 INJECTION, SOLUTION INTRAMUSCULAR; INTRAVENOUS at 02:36

## 2023-10-03 RX ADMIN — MORPHINE SULFATE 2 MG: 2 INJECTION, SOLUTION INTRAMUSCULAR; INTRAVENOUS at 20:02

## 2023-10-03 RX ADMIN — MORPHINE SULFATE 2 MG: 2 INJECTION, SOLUTION INTRAMUSCULAR; INTRAVENOUS at 04:33

## 2023-10-03 RX ADMIN — LORAZEPAM 1 MG: 2 INJECTION INTRAMUSCULAR; INTRAVENOUS at 23:13

## 2023-10-03 RX ADMIN — MORPHINE SULFATE 2 MG: 2 INJECTION, SOLUTION INTRAMUSCULAR; INTRAVENOUS at 21:49

## 2023-10-03 RX ADMIN — MORPHINE SULFATE 2 MG: 2 INJECTION, SOLUTION INTRAMUSCULAR; INTRAVENOUS at 03:42

## 2023-10-03 RX ADMIN — LORAZEPAM 1 MG: 2 INJECTION INTRAMUSCULAR; INTRAVENOUS at 01:15

## 2023-10-03 RX ADMIN — MORPHINE SULFATE 2 MG: 2 INJECTION, SOLUTION INTRAMUSCULAR; INTRAVENOUS at 23:13

## 2023-10-03 RX ADMIN — MORPHINE SULFATE 2 MG: 2 INJECTION, SOLUTION INTRAMUSCULAR; INTRAVENOUS at 01:10

## 2023-10-03 RX ADMIN — MORPHINE SULFATE 2 MG: 2 INJECTION, SOLUTION INTRAMUSCULAR; INTRAVENOUS at 06:36

## 2023-10-03 ASSESSMENT — PAIN SCALES - GENERAL: PAINLEVEL_OUTOF10: 0

## 2023-10-03 NOTE — PROGRESS NOTES
Hospitalist Progress Note   Admit Date:  2023  6:04 AM   Name:  Maddie Cordova   Age:  78 y.o. Sex:  female  :  1940   MRN:  [id number]   Room:  [facility]    Presenting/Chief Complaint: Fatigue     Reason(s) for Admission: Hypotension [I95.9]  Atrial fibrillation with RVR ([address]) [I48.91]  Elevated troponin I level [R79.89]  Hypotension, unspecified hypotension type [I95.9]  Hypothyroidism, unspecified type [E03.9]     Hospital Course: Maddie Cordova is a 78 y.o. female with medical history of breast cancer s/p mastectomy, HTN, osteoporosis, anxiety, admitted with AFIB with RVR, failure to thrive. Discharged to home 23 from subacute care. She has been seen by cardiology. Required amiodarone and levophed  CTA chest no PE, moderate bilateral effusions, anasarca and ascites    ECHO\"    Result status: Final result       Left Ventricle: Normal left ventricular systolic function with a visually estimated EF of 60 - 65%. Left ventricle size is normal. Increased wall thickness. Normal wall motion. Aortic Valve: Trileaflet valve. Sclerosis of the aortic valve cusp. Tricuspid Valve: Severe regurgitation. The estimated RVSP is 34 mmHg. Aorta: Ao root diameter is 2.9 cm. Ao Root Index is 1.58 cm/m2. Pericardium: There is a localized pericardial effusion present around the right ventricle. There is right ventricular systolic collapse. The  largest diameter in systole 1.4 cm. In diastole no pericardial space is visible. No indication of cardiac tamponade. Left pleural effusion. Contrast used: Definity. \"      Due to overall decline, family elevated for comfort care  She has been declining at home , not taking medications, poor oral intake      Discharge plans pending       Subjective & 24hr Events:       ROS not possible   at bedside  He feels she is comfortable       Assessment & Plan:     Principal Problem:    Hypotension  Plan:    Active Problems:    Atrial Meds:  Current Facility-Administered Medications   Medication Dose Route Frequency    loperamide (IMODIUM) capsule 2 mg  2 mg Oral PRN    glycopyrrolate (ROBINUL) injection 0.2 mg  0.2 mg IntraVENous Q4H PRN    LORazepam (ATIVAN) injection 1 mg  1 mg IntraVENous Q4H PRN    ketorolac (TORADOL) injection 15 mg  15 mg IntraVENous Q6H PRN    morphine injection 2 mg  2 mg IntraVENous Q15 Min PRN    glycopyrrolate (ROBINUL) injection 0.1 mg  0.1 mg IntraVENous Q6H PRN    sodium chloride flush 0.9 % injection 5-40 mL  5-40 mL IntraVENous PRN    0.9 % sodium chloride infusion   IntraVENous PRN    acetaminophen (TYLENOL) tablet 650 mg  650 mg Oral Q6H PRN    Or    acetaminophen (TYLENOL) suppository 650 mg  650 mg Rectal Q6H PRN       Signed:  Barrington Pabon MD    Part of this note may have been written by using a voice dictation software. The note has been proof read but may still contain some grammatical/other typographical errors.

## 2023-10-03 NOTE — PROGRESS NOTES
Follow-up visit with Mrs. Peterson and her spouse Preeti Mcclure. Patient appeared comfortable. Empathically listened to Preeti Mcclure as he shared his concerns about the future. Later, I conveyed care and concern for the couple's daughter, son, and daughter-in-law as they joined the visit. Son and daughter-in-law are visiting from [place]. Family expressed gratitude for the visit. Chaplains remain available for follow-up.       [address] [facility]  Board Certified

## 2023-10-03 NOTE — PROGRESS NOTES
Late entry progress note for 's visit on 10. 2.23: Mrs. Peterson transported to 5th floor on comfort measures and I visited with patient and her spouse, . Tarik Vo, conveying care and concern and offered spiritual/emotional support. Patient appeared to rest comfortably in the visit. The couple's children have been visiting as well. Chaplains remain available for spiritual care.      [address], [address] [facility]  Board Certified

## 2023-10-04 ENCOUNTER — TELEPHONE (OUTPATIENT)
Dept: INTERNAL MEDICINE CLINIC | Facility: CLINIC | Age: 83
End: 2023-10-04

## 2023-10-04 VITALS
TEMPERATURE: 98.5 F | OXYGEN SATURATION: 71 % | HEART RATE: 235 BPM | HEIGHT: 68 IN | WEIGHT: 148.59 LBS | SYSTOLIC BLOOD PRESSURE: 43 MMHG | RESPIRATION RATE: 6 BRPM | DIASTOLIC BLOOD PRESSURE: 24 MMHG | BODY MASS INDEX: 22.52 KG/M2

## 2023-10-04 PROCEDURE — 6360000002 HC RX W HCPCS: Performed by: HOSPITALIST

## 2023-10-04 PROCEDURE — 6360000002 HC RX W HCPCS: Performed by: INTERNAL MEDICINE

## 2023-10-04 RX ADMIN — MORPHINE SULFATE 2 MG: 2 INJECTION, SOLUTION INTRAMUSCULAR; INTRAVENOUS at 00:27

## 2023-10-04 RX ADMIN — MORPHINE SULFATE 2 MG: 2 INJECTION, SOLUTION INTRAMUSCULAR; INTRAVENOUS at 05:36

## 2023-10-04 RX ADMIN — LORAZEPAM 1 MG: 2 INJECTION INTRAMUSCULAR; INTRAVENOUS at 04:04

## 2023-10-04 RX ADMIN — MORPHINE SULFATE 2 MG: 2 INJECTION, SOLUTION INTRAMUSCULAR; INTRAVENOUS at 04:04

## 2023-10-04 RX ADMIN — MORPHINE SULFATE 2 MG: 2 INJECTION, SOLUTION INTRAMUSCULAR; INTRAVENOUS at 01:33

## 2023-10-04 NOTE — DISCHARGE SUMMARY
Josefa Machado is a 78 y.o. female with history of breast cancer with mastectomy, HLD, HTN, osteoporosis, hypothyroidism, former smoker, anxiety, arthritis, admitted on 9/30 in view of rapid A-fib RVR, failure to thrive and overall clinical decline. Hospitalist team was consulted on 10/1 to resume care as patient has been transitioned to comfort care measures. Patient was placed on comfort measures as per family request, patient is DNR, patient passed away on 6:30 AM on 10/4/2023. Primary cause of death:    Atrial fibrillation with rapid ventricular response. Failure to thrive in adults. Hypertension.

## 2023-10-04 NOTE — PROGRESS NOTES
Patient with family at bedside. No needs voiced at this time. 6:05: Called into room by family. No heart beat or respirations heard on ascultation. MD and house supervisor notified.      MD arrived to floor and time of death 6:30 am.

## 2023-10-04 NOTE — DEATH NOTES
Nursing team called me about patient death, and further evaluation patient did not have any pulse, no effort of breathing, pupils are dilated, no heartbeat. Patient pronounced dead at 6:30 AM on 10/4/2023.

## 2023-10-05 LAB
BACTERIA SPEC CULT: ABNORMAL
BACTERIA SPEC CULT: NORMAL
GRAM STN SPEC: ABNORMAL
SERVICE CMNT-IMP: ABNORMAL
SERVICE CMNT-IMP: NORMAL

## 2023-10-12 NOTE — PROGRESS NOTES
Physician Progress Note      Laura Oshea  CSN #:                  [id number]  :                       1940  ADMIT DATE:       2023 6:04 AM  [address] [facility] DATE:        10/4/2023 6:30 AM  RESPONDING  PROVIDER #:        Purvi Michael MD          QUERY TEXT:    Pt admitted with afib and has shock documented. If possible, please document   in progress notes and discharge summary further specificity regarding the type   of shock: The medical record reflects the following:  Risk Factors: Anxiety, Arthritis, Cancer ([address]), Depression, History of bone   density study, Hypercholesterolemia, Hypertension, Obstructive sleep apnea   syndrome, Onychomycosis, Osteoporosis, and Thyroid disease  Clinical Indicators: Shock: probably nutrition, possible UTI and ongoing rapid   afib though off levo  UA positive, urine culture not processed prior to death  Treatment: Amio and Levo gtts to maintain BP, blood cultures  Options provided:  -- Cardiogenic Shock  -- Hemorrhagic Shock  -- Neurogenic Shock  -- Traumatic Shock  -- Obstructive Shock  -- Septic Shock  -- Hypovolemic Shock  -- Other - I will add my own diagnosis  -- Disagree - Not applicable / Not valid  -- Disagree - Clinically unable to determine / Unknown  -- Refer to Clinical Documentation Reviewer    PROVIDER RESPONSE TEXT:    This patient is in hypovolemic shock.     Query created by: Chanda Mota on 10/9/2023 3:32 PM      Electronically signed by:  Purvi Michael MD 10/12/2023 12:19 AM

## 2023-10-12 NOTE — PROGRESS NOTES
Physician Progress Note      Artie Abarca  CSN #:                  [id number]  :                       1940  ADMIT DATE:       2023 6:04 AM  [address] [facility] DATE:        10/4/2023 6:30 AM  RESPONDING  PROVIDER #:        Otto Paula MD          QUERY TEXT:    Patient admitted with afib. Noted to have adult failure to thrive and   cachexia. If possible, please document in progress notes and discharge summary   if you are evaluating and /or treating any of the following: The medical record reflects the following:  Risk Factors: Moderate Malnutrition per 23 Progress Note  Clinical Indicators: 10/1 PN- She has pleural oral intake and is malnourished. She is underweight. FTT: declining with multiple hospitalizations and not improving. Labs- albumin 1.5  Treatment: Palliative care, comfort measures    ASPEN Criteria:    https://aspenjournals. onlinelibrary. silva. com/doi/full/10.1177/812724540341530  5  Options provided:  -- Protein calorie malnutrition mild  -- Protein calorie malnutrition moderate  -- Protein calorie malnutrition severe  -- Other - I will add my own diagnosis  -- Disagree - Not applicable / Not valid  -- Disagree - Clinically unable to determine / Unknown  -- Refer to Clinical Documentation Reviewer    PROVIDER RESPONSE TEXT:    This patient has moderate protein calorie malnutrition.     Query created by: Zamzam Lewis on 10/9/2023 3:28 PM      Electronically signed by:  Otto Paula MD 10/11/2023 8:27 PM

## (undated) DEVICE — CANNULA NSL ORAL AD FOR CAPNOFLEX CO2 O2 AIRLFE

## (undated) DEVICE — (D)FCPS GRSP 9MM 230CMLX2.4MM -- DISC BY MFR

## (undated) DEVICE — CONTAINER PREFIL FRMLN 40ML --

## (undated) DEVICE — FORCEPS GRASPING RAT TOOTH 2.4X7MMX230CM RESCUE

## (undated) DEVICE — CONNECTOR TBNG OD5-7MM O2 END DISP

## (undated) DEVICE — ESOPHAGEAL BALLOON DILATATION CATHETER: Brand: CRE FIXED WIRE

## (undated) DEVICE — FORCEPS BX L240CM JAW DIA2.8MM L CAP W/ NDL MIC MESH TOOTH

## (undated) DEVICE — KENDALL RADIOLUCENT FOAM MONITORING ELECTRODE RECTANGULAR SHAPE: Brand: KENDALL

## (undated) DEVICE — CLIPPING DEVICE: Brand: RESOLUTION CLIP

## (undated) DEVICE — BLOCK BITE AD 60FR W/ VELC STRP ADDRESSES MOST PT AND